# Patient Record
Sex: MALE | Race: WHITE | NOT HISPANIC OR LATINO | Employment: OTHER | URBAN - METROPOLITAN AREA
[De-identification: names, ages, dates, MRNs, and addresses within clinical notes are randomized per-mention and may not be internally consistent; named-entity substitution may affect disease eponyms.]

---

## 2017-01-17 ENCOUNTER — HOSPITAL ENCOUNTER (EMERGENCY)
Facility: HOSPITAL | Age: 82
Discharge: HOME | End: 2017-01-17
Attending: SURGERY
Payer: MEDICARE

## 2017-01-17 VITALS
HEART RATE: 63 BPM | WEIGHT: 145 LBS | OXYGEN SATURATION: 96 % | RESPIRATION RATE: 18 BRPM | DIASTOLIC BLOOD PRESSURE: 74 MMHG | TEMPERATURE: 98 F | SYSTOLIC BLOOD PRESSURE: 131 MMHG

## 2017-01-17 DIAGNOSIS — R51.9 NONINTRACTABLE HEADACHE, UNSPECIFIED CHRONICITY PATTERN, UNSPECIFIED HEADACHE TYPE: ICD-10-CM

## 2017-01-17 DIAGNOSIS — J01.00 ACUTE NON-RECURRENT MAXILLARY SINUSITIS: Primary | ICD-10-CM

## 2017-01-17 LAB
ALBUMIN SERPL BCP-MCNC: 3.4 G/DL
ALP SERPL-CCNC: 100 U/L
ALT SERPL W/O P-5'-P-CCNC: 26 U/L
ANION GAP SERPL CALC-SCNC: 9 MMOL/L
APTT BLDCRRT: 49.1 SEC
AST SERPL-CCNC: 28 U/L
BASOPHILS # BLD AUTO: 0.05 K/UL
BASOPHILS NFR BLD: 0.6 %
BILIRUB SERPL-MCNC: 0.7 MG/DL
BNP SERPL-MCNC: 135 PG/ML
BUN SERPL-MCNC: 21 MG/DL
CALCIUM SERPL-MCNC: 9.2 MG/DL
CHLORIDE SERPL-SCNC: 105 MMOL/L
CK MB SERPL-MCNC: 1.9 NG/ML
CK MB SERPL-RTO: 2.3 %
CK SERPL-CCNC: 81 U/L
CK SERPL-CCNC: 81 U/L
CO2 SERPL-SCNC: 27 MMOL/L
CREAT SERPL-MCNC: 0.9 MG/DL
DIFFERENTIAL METHOD: ABNORMAL
EOSINOPHIL # BLD AUTO: 0.6 K/UL
EOSINOPHIL NFR BLD: 7.5 %
ERYTHROCYTE [DISTWIDTH] IN BLOOD BY AUTOMATED COUNT: 13.4 %
EST. GFR  (AFRICAN AMERICAN): >60 ML/MIN/1.73 M^2
EST. GFR  (NON AFRICAN AMERICAN): >60 ML/MIN/1.73 M^2
GLUCOSE SERPL-MCNC: 101 MG/DL
HCT VFR BLD AUTO: 40.3 %
HGB BLD-MCNC: 14 G/DL
INR PPP: 5.1
LYMPHOCYTES # BLD AUTO: 2.8 K/UL
LYMPHOCYTES NFR BLD: 34.8 %
MAGNESIUM SERPL-MCNC: 2 MG/DL
MCH RBC QN AUTO: 32.7 PG
MCHC RBC AUTO-ENTMCNC: 34.7 %
MCV RBC AUTO: 94 FL
MONOCYTES # BLD AUTO: 0.8 K/UL
MONOCYTES NFR BLD: 10.2 %
NEUTROPHILS # BLD AUTO: 3.8 K/UL
NEUTROPHILS NFR BLD: 46.9 %
PHOSPHATE SERPL-MCNC: 3.4 MG/DL
PLATELET # BLD AUTO: 147 K/UL
PMV BLD AUTO: 10.4 FL
POTASSIUM SERPL-SCNC: 4 MMOL/L
PROT SERPL-MCNC: 6.9 G/DL
PROTHROMBIN TIME: 49.7 SEC
RBC # BLD AUTO: 4.28 M/UL
SODIUM SERPL-SCNC: 141 MMOL/L
T4 FREE SERPL-MCNC: 0.86 NG/DL
TROPONIN I SERPL DL<=0.01 NG/ML-MCNC: <0.006 NG/ML
TSH SERPL DL<=0.005 MIU/L-ACNC: 4.53 UIU/ML
WBC # BLD AUTO: 8.1 K/UL

## 2017-01-17 PROCEDURE — 83880 ASSAY OF NATRIURETIC PEPTIDE: CPT

## 2017-01-17 PROCEDURE — 96372 THER/PROPH/DIAG INJ SC/IM: CPT

## 2017-01-17 PROCEDURE — 82306 VITAMIN D 25 HYDROXY: CPT

## 2017-01-17 PROCEDURE — 85610 PROTHROMBIN TIME: CPT

## 2017-01-17 PROCEDURE — 99284 EMERGENCY DEPT VISIT MOD MDM: CPT | Mod: 25

## 2017-01-17 PROCEDURE — 83735 ASSAY OF MAGNESIUM: CPT

## 2017-01-17 PROCEDURE — 84484 ASSAY OF TROPONIN QUANT: CPT

## 2017-01-17 PROCEDURE — 63600175 PHARM REV CODE 636 W HCPCS: Performed by: SURGERY

## 2017-01-17 PROCEDURE — 36415 COLL VENOUS BLD VENIPUNCTURE: CPT

## 2017-01-17 PROCEDURE — 82553 CREATINE MB FRACTION: CPT

## 2017-01-17 PROCEDURE — 84443 ASSAY THYROID STIM HORMONE: CPT

## 2017-01-17 PROCEDURE — 80053 COMPREHEN METABOLIC PANEL: CPT

## 2017-01-17 PROCEDURE — 82607 VITAMIN B-12: CPT

## 2017-01-17 PROCEDURE — 93005 ELECTROCARDIOGRAM TRACING: CPT

## 2017-01-17 PROCEDURE — 85025 COMPLETE CBC W/AUTO DIFF WBC: CPT

## 2017-01-17 PROCEDURE — 84439 ASSAY OF FREE THYROXINE: CPT

## 2017-01-17 PROCEDURE — 99900031 HC PATIENT EDUCATION (STAT)

## 2017-01-17 PROCEDURE — 85730 THROMBOPLASTIN TIME PARTIAL: CPT

## 2017-01-17 PROCEDURE — 84100 ASSAY OF PHOSPHORUS: CPT

## 2017-01-17 PROCEDURE — 82746 ASSAY OF FOLIC ACID SERUM: CPT

## 2017-01-17 PROCEDURE — 93010 ELECTROCARDIOGRAM REPORT: CPT | Mod: ,,, | Performed by: INTERNAL MEDICINE

## 2017-01-17 RX ORDER — AZITHROMYCIN 250 MG/1
TABLET, FILM COATED ORAL
Qty: 6 TABLET | Refills: 0 | Status: SHIPPED | OUTPATIENT
Start: 2017-01-17 | End: 2017-01-23

## 2017-01-17 RX ORDER — HYDROCHLOROTHIAZIDE 25 MG/1
25 TABLET ORAL
COMMUNITY
End: 2017-02-24

## 2017-01-17 RX ORDER — ATORVASTATIN CALCIUM 40 MG/1
40 TABLET, FILM COATED ORAL DAILY
COMMUNITY

## 2017-01-17 RX ORDER — WARFARIN SODIUM 5 MG/1
5 TABLET ORAL DAILY
COMMUNITY
End: 2017-08-18

## 2017-01-17 RX ORDER — CEFTRIAXONE 1 G/1
1 INJECTION, POWDER, FOR SOLUTION INTRAMUSCULAR; INTRAVENOUS
Status: COMPLETED | OUTPATIENT
Start: 2017-01-17 | End: 2017-01-17

## 2017-01-17 RX ORDER — SOTALOL HYDROCHLORIDE 80 MG/1
40 TABLET ORAL 2 TIMES DAILY
COMMUNITY

## 2017-01-17 RX ADMIN — CEFTRIAXONE 1 G: 1 INJECTION, POWDER, FOR SOLUTION INTRAMUSCULAR; INTRAVENOUS at 07:01

## 2017-01-17 NOTE — ED AVS SNAPSHOT
[facility]  [address] [facility] [address]               Hernesto Sofia   2017  6:18 PM   ED    Description:  Male : 1927   Department:  [facility]           Your Care was Coordinated By:     Provider Role From To    Cheo Lu MD Attending Provider 17 [id number] --      Reason for Visit     Weakness     Headache           Diagnoses this Visit        Comments    Acute non-recurrent maxillary sinusitis    -  Primary     Nonintractable headache, unspecified chronicity pattern, unspecified headache type           ED Disposition     ED Disposition Condition Comment    Discharge             To Do List           Follow-up Information     Follow up with Matilde Hirsch MD. Schedule an appointment as soon as possible for a visit in 2 days.    Specialty:  Family Medicine    Contact information:    [address]  [facility] [address]  [phone]         These Medications        Disp Refills Start End    azithromycin (Z-RANJEET) 250 MG tablet 6 tablet 0 2017     Z-PACK AS DIRECTED      [facility]     [facility] Care [facility] -  Assistance  Registered nurses in the [facility] provide clinical advisement, health education, appointment booking, and other advisory services.  Call for this free service at [phone].             Medications           Message regarding Medications     Verify the changes and/or additions to your medication regime listed below are the same as discussed with your clinician today.  If any of these changes or additions are incorrect, please notify your healthcare provider.        START taking these NEW medications        Refills    azithromycin (Z-RANJEET) 250 MG tablet 0    Sig: Z-PACK AS DIRECTED    Class: Print      These medications were administered today        Dose Freq    cefTRIAXone injection 1 g 1 g ED 1 Time    Sig: Inject 1 g into the muscle ED 1 Time.    Class: Normal     Route: Intramuscular           Verify that the below list of medications is an accurate representation of the medications you are currently taking.  If none reported, the list may be blank. If incorrect, please contact your healthcare provider. Carry this list with you in case of emergency.           Current Medications     atorvastatin (LIPITOR) 40 MG tablet Take 40 mg by mouth once daily.    hydrochlorothiazide (HYDRODIURIL) 25 MG tablet Take 25 mg by mouth every other day.    sotalol (BETAPACE) 80 MG tablet Take 40 mg by mouth 2 (two) times daily.    warfarin (COUMADIN) 5 MG tablet Take 5 mg by mouth Daily.    azithromycin (Z-RANJEET) 250 MG tablet Z-PACK AS DIRECTED    cefTRIAXone injection 1 g Inject 1 g into the muscle ED 1 Time.           Clinical Reference Information           Your Vitals Were     BP Pulse Temp Resp Weight SpO2    131/74 (BP Location: Left arm, Patient Position: Lying, BP Method: Automatic) 63 98.2 °F (36.8 °C) (Oral) 18 65.8 kg (145 lb) 96%      Allergies as of 1/17/2017     No Known Allergies      Immunizations Administered on Date of Encounter - 1/17/2017     None      ED Micro, Lab, POCT     Start Ordered       Status Ordering Provider    01/17/17 1821 01/17/17 1821  Urinalysis  STAT      Acknowledged     01/17/17 1821 01/17/17 1821  Magnesium  STAT      Final result     01/17/17 1821 01/17/17 1821  Phosphorus  STAT      Final result     01/17/17 1821 01/17/17 1821  TSH  STAT      Final result     01/17/17 1821 01/17/17 1821  Vitamin D  STAT      In process     01/17/17 1821 01/17/17 1821  Folate  STAT      In process     01/17/17 1821 01/17/17 1821  Vitamin B12  STAT      In process     01/17/17 1821 01/17/17 1821  Comprehensive metabolic panel  STAT      Final result     01/17/17 1821 01/17/17 1821  CBC auto differential  STAT      Final result     01/17/17 1821 01/17/17 1821  Troponin I  Now then every 6 hours     Start Status   01/17/17 1821 Final result   01/18/17 0021 Scheduled    01/18/17 0621 Scheduled   01/18/17 1221 Scheduled   01/18/17 1821 Scheduled   01/19/17 0021 Scheduled   01/19/17 0621 Scheduled   01/19/17 1221 Scheduled   01/19/17 1821 Scheduled   01/20/17 0021 Scheduled   01/20/17 0621 Scheduled   01/20/17 1221 Scheduled   01/20/17 1821 Scheduled       Acknowledged     01/17/17 1821 01/17/17 1821  CK  STAT      Final result     01/17/17 1821 01/17/17 1821  CK-MB  STAT      Final result     01/17/17 1821 01/17/17 1821  Brain natriuretic peptide  STAT      Final result     01/17/17 1821 01/17/17 1821  Protime-INR  STAT      In process     01/17/17 1821 01/17/17 1821  APTT  STAT      In process     01/17/17 1821 01/17/17 1821  T4, free  Once      In process       ED Imaging Orders     Start Ordered       Status Ordering Provider    01/17/17 1821 01/17/17 1821  CT Head Without Contrast  1 time imaging      Final result     01/17/17 1821 01/17/17 1821  X-Ray Chest 1 View  1 time imaging      Final result         Discharge Instructions         Sinus Headaches     When using nasal spray, keep your chin down and angle the spray away from center.     Sinus headaches can cause a gnawing pain behind the nose and eyes. The pain most often gets worse in the afternoon and evening. You may also run a fever. Sinus headaches are caused by colds or allergies that make the nasal passages inflamed or infected.  To help prevent sinus headaches:  · Treat colds promptly to keep mucus from backing up.  · Avoid things that trigger sinus problems, such as pollens, dust, smoke, fumes, and strong odors.  · Take allergy medications as directed by your doctor.  To relieve the pain:  · Keep your sinuses open and free of mucus. Try over-the-counter sinus rinse products.  · Use a nasal decongestant as directed to reduce the inflammation.  · Drink fluids to keep the mucus thinner. This helps it drain more easily. You can also use a humidifier.  · Apply hot packs to the area around your sinuses. Use a hot water  bottle.  · See your doctor if your sinus headache lasts more than two weeks. You may need medication for a sinus infection or an exam to check for other headache conditions, like migraines.  © 6493-7278 [employer], "[employer]. [address] [place] [address], [place] [address]. All rights reserved. This information is not intended as a substitute for professional medical care. Always follow your healthcare professional's instructions.          [facility] Sign-Up     Activating your [facility] account is as easy as 1-2-3!     1) Visit [email or web address], select Sign Up Now, enter this activation code and your date of birth, then select Next.  [email or web address]  Expires: 3/3/2017  7:45 PM      2) Create a username and password to use when you visit [facility] in the future and select a security question in case you lose your password and select Next.    3) Enter your e-mail address and click Sign Up!    Additional Information  If you have questions, please e-mail [email or web address] or call [phone] to talk to our [facility] staff. Remember, [facility] is NOT to be used for urgent needs. For medical emergencies, dial 911.          [facility] complies with applicable Federal civil rights laws and does not discriminate on the basis of race, color, national origin, age, disability, or sex.        Language Assistance Services     ATTENTION: Language assistance services are available, free of charge. Please call [phone].      ATENCIÓN: Si habla coleañol, tiene a zuluaga disposición servicios gratuitos de asistencia lingüística. Llame al [phone].     CHÚ Ý: N?u b?n nói Ti?ng Vi?t, có các d?ch v? h? tr? ngôn ng? mi?n phí dành cho b?n. G?i s? [phone].

## 2017-01-18 LAB
25(OH)D3+25(OH)D2 SERPL-MCNC: 45 NG/ML
FOLATE SERPL-MCNC: 17 NG/ML
VIT B12 SERPL-MCNC: 784 PG/ML

## 2017-01-18 NOTE — DISCHARGE INSTRUCTIONS
Sinus Headaches     When using nasal spray, keep your chin down and angle the spray away from center.     Sinus headaches can cause a gnawing pain behind the nose and eyes. The pain most often gets worse in the afternoon and evening. You may also run a fever. Sinus headaches are caused by colds or allergies that make the nasal passages inflamed or infected.  To help prevent sinus headaches:  · Treat colds promptly to keep mucus from backing up.  · Avoid things that trigger sinus problems, such as pollens, dust, smoke, fumes, and strong odors.  · Take allergy medications as directed by your doctor.  To relieve the pain:  · Keep your sinuses open and free of mucus. Try over-the-counter sinus rinse products.  · Use a nasal decongestant as directed to reduce the inflammation.  · Drink fluids to keep the mucus thinner. This helps it drain more easily. You can also use a humidifier.  · Apply hot packs to the area around your sinuses. Use a hot water bottle.  · See your doctor if your sinus headache lasts more than two weeks. You may need medication for a sinus infection or an exam to check for other headache conditions, like migraines.  © 1561-7285 [employer]. [address] [place] [address], [place] [address]. All rights reserved. This information is not intended as a substitute for professional medical care. Always follow your healthcare professional's instructions.

## 2017-01-18 NOTE — ED NOTES
"Pt presents to the ER with c/o headache. Pt states "it's not really a headache, but it hurts when I touch my head." Family says that patient "jerked" when they touched the back of his head. Pt states that his head feels hot. Pt denies any injury or fall. Family states that the patient has been weak, and not wanting to eat lately. Patient says "I just don't feel good."   "

## 2017-01-18 NOTE — ED PROVIDER NOTES
[facility]                                        January 17, 2017                     Chief Complaint  89 y.o. male with Weakness and Headache    History of Present Illness  Hernesto Sofia presents to the emergency room with right-sided headache ×2 days  Patient has felt a shocking pain in his right scalp area since yesterday per his daughters  The patient is alert and oriented ×3 with a GCS 15 and normal motor neuro exam here  Patient states he currently has no pain or headache, answers all questions appropriately  Patient had left sided shingles last year, daughters are concerned this is a recurrence  Patient has no shingles type rash, no obvious shingles type pain, no shingles noted now  Patient denies any chest pain or shortness of breath, last headache was this morning    The history is provided by the patient    Past Medical History   -- A-fib    -- Anticoagulant long-term use    -- Hyperlipidemia    -- Hypertension    -- Pacemaker      History reviewed. No pertinent past surgical history.  No Known Allergies     Review of Systems and Physical Exam     Review of Systems  -- Constitution - no fever, denies fatigue, no weakness, no chills  -- Eyes - no tearing or redness, no visual disturbance  -- Ear, Nose - no tinnitus or earache, no nasal congestion or discharge  -- Mouth,Throat - no sore throat, no toothache, normal voice, normal swallowing  -- Respiratory - denies cough and congestion, no shortness of breath, no DONALDSON  -- Cardiovascular - denies chest pain, no palpitations, denies claudication  -- Gastrointestinal - denies abdominal pain, nausea, vomiting, or diarrhea  -- Musculoskeletal - denies back pain, negative for myalgias and arthralgias   -- Neurological - headache, denies weakness or seizure; no LOC  -- Skin - denies pallor, rash, or changes in skin. no hives or welts noted    Vital Signs  -- BP is 131/74 and his pulse is 63. His respiration is 18 and oxygen saturation is 96%.       Physical Exam  -- Nursing note and vitals reviewed  -- Constitutional: Appears well-developed and well-nourished  -- Head: Atraumatic. Normocephalic. No obvious abnormality  -- Eyes: Pupils are equal and reactive to light. Normal conjunctiva and lids  -- Nose: Nose normal in appearance, nares grossly normal. No discharge  -- Throat: Mucous membranes moist, pharynx normal, normal tonsils. No lesions   -- Ears: External ears and TM normal bilaterally. Normal hearing and no drainage  -- Neck: Normal range of motion. Neck supple. No masses, trachea midline  -- Cardiac: Normal rate, regular rhythm and normal heart sounds  -- Pulmonary: Normal respiratory effort, breath sounds clear to auscultation  -- Abdominal: Soft, no tenderness. Normal bowel sounds. Normal liver edge  -- Musculoskeletal: Normal range of motion, no effusions. Joints stable   -- Neurological: No focal deficits. Showed good interaction with staff    Emergency Room Course     Treatment and Evaluation  -- The CT of the head performed in the ER today was negative for acute pathology   -- The EKG findings today were without concerning findings from baseline   -- Chest x-ray showed no infiltrate and showed no acute pathology   -- The electrolytes drawn in the ER today were within normal limits   -- The CBC drawn in the ER today was within normal limits   -- The cardiac enzymes were within normal limits   -- BNP was 135  -- The magnesium and phosphorus were within normal limits   -- The PT, PTT, and INR were within normal limits.    -- Vitamin D, folate, B12, T4 pending  -- TSH is 4.5  -- IM Rocephin given today in the ER    Diagnosis  -- Acute non-recurrent maxillary sinusitis  -- Nonintractable headache    Disposition and Plan  -- Disposition: home  -- Condition: stable  -- Follow-up: Patient to follow up with a MD in 1-2 days.  -- I advised the patient that we have found no life threatening condition today  -- At this time, I believe the patient is  clinically stable for discharge.   -- The patient acknowledges that close follow up with a MD is required   -- Patient agrees to comply with all instruction and direction given in the ER    This note is dictated on Dragon Natural Speaking word recognition program.  There are word recognition mistakes that are occasionally missed on review.           Cheo Lu MD  01/17/17 1943

## 2017-01-20 ENCOUNTER — LAB VISIT (OUTPATIENT)
Dept: LAB | Facility: HOSPITAL | Age: 82
End: 2017-01-20
Attending: INTERNAL MEDICINE
Payer: MEDICARE

## 2017-01-20 DIAGNOSIS — I48.91 ATRIAL FIBRILLATION: Primary | ICD-10-CM

## 2017-01-20 LAB
INR PPP: 1.5
PROTHROMBIN TIME: 14.7 SEC

## 2017-01-20 PROCEDURE — 36415 COLL VENOUS BLD VENIPUNCTURE: CPT

## 2017-01-20 PROCEDURE — 85610 PROTHROMBIN TIME: CPT

## 2017-01-23 ENCOUNTER — HOSPITAL ENCOUNTER (EMERGENCY)
Facility: HOSPITAL | Age: 82
Discharge: HOME | End: 2017-01-24
Attending: EMERGENCY MEDICINE
Payer: MEDICARE

## 2017-01-23 DIAGNOSIS — F41.9 ANXIETY: Primary | ICD-10-CM

## 2017-01-23 PROCEDURE — 93005 ELECTROCARDIOGRAM TRACING: CPT

## 2017-01-23 PROCEDURE — 99900031 HC PATIENT EDUCATION (STAT)

## 2017-01-23 PROCEDURE — 99284 EMERGENCY DEPT VISIT MOD MDM: CPT

## 2017-01-23 PROCEDURE — 87400 INFLUENZA A/B EACH AG IA: CPT | Mod: 59

## 2017-01-23 PROCEDURE — 36415 COLL VENOUS BLD VENIPUNCTURE: CPT

## 2017-01-23 PROCEDURE — 80053 COMPREHEN METABOLIC PANEL: CPT

## 2017-01-23 PROCEDURE — 85025 COMPLETE CBC W/AUTO DIFF WBC: CPT

## 2017-01-23 PROCEDURE — 93010 ELECTROCARDIOGRAM REPORT: CPT | Mod: ,,, | Performed by: INTERNAL MEDICINE

## 2017-01-23 NOTE — ED AVS SNAPSHOT
[facility]  [address] [facility] [address]               Hernesto Sofia   2017 11:20 PM   ED    Description:  Male : 1927   Department:  [facility]           Your Care was Coordinated By:     Provider Role From To    Vik Lam MD Attending Provider 17 [phone] --      Reason for Visit     Spasms           Diagnoses this Visit        Comments    Anxiety    -  Primary       ED Disposition     ED Disposition Condition Comment    Discharge             To Do List           Follow-up Information     Schedule an appointment as soon as possible for a visit with Hasmukh Pederson MD.    Specialty:  Family Medicine    Contact information:    [address] [place] [address]  [phone]         These Medications        Disp Refills Start End    lorazepam (ATIVAN) 1 MG tablet 10 tablet 0 2017    Take 0.5 tablets (0.5 mg total) by mouth every 8 (eight) hours as needed for Anxiety. - Oral      [facility]     [facility] Care [facility] -  Assistance  Registered nurses in the [facility] provide clinical advisement, health education, appointment booking, and other advisory services.  Call for this free service at [phone].             Medications           Message regarding Medications     Verify the changes and/or additions to your medication regime listed below are the same as discussed with your clinician today.  If any of these changes or additions are incorrect, please notify your healthcare provider.        START taking these NEW medications        Refills    lorazepam (ATIVAN) 1 MG tablet 0    Sig: Take 0.5 tablets (0.5 mg total) by mouth every 8 (eight) hours as needed for Anxiety.    Class: Print    Route: Oral      These medications were administered today        Dose Freq    lorazepam tablet 0.5 mg 0.5 mg ED 1 Time    Sig: Take 1 tablet (0.5 mg total) by mouth ED 1 Time.    Class:  Normal    Route: Oral      STOP taking these medications     azithromycin (Z-RANJEET) 250 MG tablet Z-PACK AS DIRECTED           Verify that the below list of medications is an accurate representation of the medications you are currently taking.  If none reported, the list may be blank. If incorrect, please contact your healthcare provider. Carry this list with you in case of emergency.           Current Medications     atorvastatin (LIPITOR) 40 MG tablet Take 40 mg by mouth once daily.    hydrochlorothiazide (HYDRODIURIL) 25 MG tablet Take 25 mg by mouth every other day.    sotalol (BETAPACE) 80 MG tablet Take 40 mg by mouth 2 (two) times daily.    warfarin (COUMADIN) 5 MG tablet Take 5 mg by mouth Daily.    lorazepam (ATIVAN) 1 MG tablet Take 0.5 tablets (0.5 mg total) by mouth every 8 (eight) hours as needed for Anxiety.    lorazepam tablet 0.5 mg Take 1 tablet (0.5 mg total) by mouth ED 1 Time.           Clinical Reference Information           Your Vitals Were     BP Pulse Temp Resp SpO2       145/74 70 98.5 °F (36.9 °C) (Oral) 18 98%       Allergies as of 1/24/2017     No Known Allergies      Immunizations Administered on Date of Encounter - 1/24/2017     None      ED Micro, Lab, POCT     Start Ordered       Status Ordering Provider    01/23/17 2330 01/23/17 2333  Influenza antigen Nasopharyngeal Swab  Once      Final result     01/23/17 2329 01/23/17 2328  Comprehensive metabolic panel  STAT      Final result     01/23/17 2329 01/23/17 2328  CBC auto differential  STAT      Final result       ED Imaging Orders     Start Ordered       Status Ordering Provider    01/23/17 2329 01/23/17 2328  CT Head Without Contrast  1 time imaging      In process         Discharge Instructions         Understanding Anxiety Disorders  Almost everyone gets nervous now and then. Its normal to have knots in your stomach before a test, or for your heart to race on a first date. But an anxiety disorder is much more than a case of  nerves. In fact, its symptoms may be overwhelming. But treatment can relieve many of these symptoms. Talking to your doctor is the first step.    What are anxiety disorders?  An anxiety disorder causes intense feelings of panic and fear. These feelings may arise for no apparent reason. And they tend to recur again and again. They may prevent you from coping with life and cause you great distress. As a result, you may avoid anything that triggers your fear. In extreme cases, you may never leave the house. Anxiety disorders may cause other symptoms, such as:  · Obsessive thoughts you cant control  · Constant nightmares or painful thoughts of the past  · Nausea, sweating, and muscle tension  · Difficulty sleeping or concentrating  What causes anxiety disorders?  Anxiety disorders tend to run in families. For some people, childhood abuse or neglect may play a role. For others, stressful life events or trauma may trigger anxiety disorders. Anxiety can trigger low self-esteem and poor coping skills.  Common anxiety disorders  · Panic disorder: This causes an intense fear of being in danger.  · Phobias: These are extreme fears of certain objects, places, or events.  · Obsessive-compulsive disorder: This causes you to have unwanted thoughts. You also may perform certain actions over and over.  · Posttraumatic stress disorder: This occurs in people who have survived a terrible ordeal. It can cause nightmares and flashbacks about the event.  · Generalized anxiety disorder: This causes constant worry that can greatly disrupt your life.   Getting better  You may believe that nothing can help you. Or, you might fear what others may think. But most anxiety symptoms can be eased. Having an anxiety disorder is nothing to be ashamed of. Most people do best with treatment that combines medication and therapy. Although these arent cures, they can help you live a healthier life.  © 7172-7619 [employer]. [address] [place] [address]  [place] [address]. All rights reserved. This information is not intended as a substitute for professional medical care. Always follow your healthcare professional's instructions.          [facility] Sign-Up     Activating your [facility] account is as easy as 1-2-3!     1) Visit [email or web address], select Sign Up Now, enter this activation code and your date of birth, then select Next.  [email or web address]  Expires: 3/3/2017  7:45 PM      2) Create a username and password to use when you visit [facility] in the future and select a security question in case you lose your password and select Next.    3) Enter your e-mail address and click Sign Up!    Additional Information  If you have questions, please e-mail [email or web address] or call [phone] to talk to our [facility] staff. Remember, [facility] is NOT to be used for urgent needs. For medical emergencies, dial 911.          [facility] complies with applicable Federal civil rights laws and does not discriminate on the basis of race, color, national origin, age, disability, or sex.        Language Assistance Services     ATTENTION: Language assistance services are available, free of charge. Please call [phone].      ATENCIÓN: Si habla español, tiene a zuluaga disposición servicios gratuitos de asistencia lingüística. Llame al [phone].     CHÚ Ý: N?u b?n nói Ti?ng Vi?t, có các d?ch v? h? tr? ngôn ng? mi?n phí dành cho b?n. G?i s? [phone].

## 2017-01-24 VITALS
DIASTOLIC BLOOD PRESSURE: 76 MMHG | SYSTOLIC BLOOD PRESSURE: 137 MMHG | OXYGEN SATURATION: 97 % | HEART RATE: 71 BPM | TEMPERATURE: 99 F | RESPIRATION RATE: 18 BRPM

## 2017-01-24 LAB
ALBUMIN SERPL BCP-MCNC: 3.2 G/DL
ALP SERPL-CCNC: 92 U/L
ALT SERPL W/O P-5'-P-CCNC: 28 U/L
ANION GAP SERPL CALC-SCNC: 9 MMOL/L
AST SERPL-CCNC: 34 U/L
BASOPHILS # BLD AUTO: 0.08 K/UL
BASOPHILS NFR BLD: 1 %
BILIRUB SERPL-MCNC: 0.5 MG/DL
BUN SERPL-MCNC: 22 MG/DL
CALCIUM SERPL-MCNC: 9.2 MG/DL
CHLORIDE SERPL-SCNC: 105 MMOL/L
CO2 SERPL-SCNC: 28 MMOL/L
CREAT SERPL-MCNC: 0.8 MG/DL
DIFFERENTIAL METHOD: ABNORMAL
EOSINOPHIL # BLD AUTO: 0.5 K/UL
EOSINOPHIL NFR BLD: 6.8 %
ERYTHROCYTE [DISTWIDTH] IN BLOOD BY AUTOMATED COUNT: 13 %
EST. GFR  (AFRICAN AMERICAN): >60 ML/MIN/1.73 M^2
EST. GFR  (NON AFRICAN AMERICAN): >60 ML/MIN/1.73 M^2
FLUAV AG SPEC QL IA: NEGATIVE
FLUBV AG SPEC QL IA: NEGATIVE
GLUCOSE SERPL-MCNC: 96 MG/DL
HCT VFR BLD AUTO: 39.1 %
HGB BLD-MCNC: 13.6 G/DL
LYMPHOCYTES # BLD AUTO: 2.1 K/UL
LYMPHOCYTES NFR BLD: 28 %
MCH RBC QN AUTO: 32.9 PG
MCHC RBC AUTO-ENTMCNC: 34.8 %
MCV RBC AUTO: 94 FL
MONOCYTES # BLD AUTO: 0.8 K/UL
MONOCYTES NFR BLD: 10 %
NEUTROPHILS # BLD AUTO: 4.1 K/UL
NEUTROPHILS NFR BLD: 54.2 %
PLATELET # BLD AUTO: 169 K/UL
PMV BLD AUTO: 10.7 FL
POTASSIUM SERPL-SCNC: 4.1 MMOL/L
PROT SERPL-MCNC: 6.8 G/DL
RBC # BLD AUTO: 4.14 M/UL
SODIUM SERPL-SCNC: 142 MMOL/L
SPECIMEN SOURCE: NORMAL
WBC # BLD AUTO: 7.63 K/UL

## 2017-01-24 PROCEDURE — 25000003 PHARM REV CODE 250: Performed by: EMERGENCY MEDICINE

## 2017-01-24 RX ORDER — LORAZEPAM 0.5 MG/1
0.5 TABLET ORAL
Status: COMPLETED | OUTPATIENT
Start: 2017-01-24 | End: 2017-01-24

## 2017-01-24 RX ORDER — LORAZEPAM 1 MG/1
0.5 TABLET ORAL EVERY 8 HOURS PRN
Qty: 10 TABLET | Refills: 0 | Status: SHIPPED | OUTPATIENT
Start: 2017-01-24 | End: 2017-02-17 | Stop reason: ALTCHOICE

## 2017-01-24 RX ADMIN — LORAZEPAM 0.5 MG: 0.5 TABLET ORAL at 12:01

## 2017-01-24 NOTE — DISCHARGE INSTRUCTIONS
Understanding Anxiety Disorders  Almost everyone gets nervous now and then. Its normal to have knots in your stomach before a test, or for your heart to race on a first date. But an anxiety disorder is much more than a case of nerves. In fact, its symptoms may be overwhelming. But treatment can relieve many of these symptoms. Talking to your doctor is the first step.    What are anxiety disorders?  An anxiety disorder causes intense feelings of panic and fear. These feelings may arise for no apparent reason. And they tend to recur again and again. They may prevent you from coping with life and cause you great distress. As a result, you may avoid anything that triggers your fear. In extreme cases, you may never leave the house. Anxiety disorders may cause other symptoms, such as:  · Obsessive thoughts you cant control  · Constant nightmares or painful thoughts of the past  · Nausea, sweating, and muscle tension  · Difficulty sleeping or concentrating  What causes anxiety disorders?  Anxiety disorders tend to run in families. For some people, childhood abuse or neglect may play a role. For others, stressful life events or trauma may trigger anxiety disorders. Anxiety can trigger low self-esteem and poor coping skills.  Common anxiety disorders  · Panic disorder: This causes an intense fear of being in danger.  · Phobias: These are extreme fears of certain objects, places, or events.  · Obsessive-compulsive disorder: This causes you to have unwanted thoughts. You also may perform certain actions over and over.  · Posttraumatic stress disorder: This occurs in people who have survived a terrible ordeal. It can cause nightmares and flashbacks about the event.  · Generalized anxiety disorder: This causes constant worry that can greatly disrupt your life.   Getting better  You may believe that nothing can help you. Or, you might fear what others may think. But most anxiety symptoms can be eased. Having an anxiety  disorder is nothing to be ashamed of. Most people do best with treatment that combines medication and therapy. Although these arent cures, they can help you live a healthier life.  © 6422-5825 [employer]. [address] [place] [address], [place] [address]. All rights reserved. This information is not intended as a substitute for professional medical care. Always follow your healthcare professional's instructions.

## 2017-01-24 NOTE — ED TRIAGE NOTES
Pt reports spasms to his left leg and right side of his face at times. He reports he came in last week for the same issue and was diagnosed at sinus infection.

## 2017-01-24 NOTE — ED PROVIDER NOTES
Encounter Date: 1/23/2017       History     Chief Complaint   Patient presents with    Spasms     Review of patient's allergies indicates:  No Known Allergies  Patient is a 89 y.o. male presenting with the following complaint: general illness. The history is provided by the patient and a relative.   General Illness    The current episode started today. The problem occurs frequently. The problem has been resolved. The pain is at a severity of 2/10. Nothing relieves the symptoms. The symptoms are aggravated by activity. Associated symptoms include muscle aches. Pertinent negatives include no fever, no decreased vision, no double vision, no eye itching, no photophobia, no abdominal pain, no constipation, no diarrhea, no nausea, no vomiting, no congestion, no ear discharge, no ear pain, no headaches, no hearing loss, no mouth sores, no rhinorrhea, no sore throat, no stridor, no swollen glands, no neck pain, no neck stiffness, no cough, no shortness of breath, no URI, no wheezing, no rash, no discharge, no pain and no eye redness. Services received include tests performed. Recently, medical care has been given at this facility.     Past Medical History   Diagnosis Date    A-fib     Anticoagulant long-term use     Hyperlipidemia     Hypertension     Pacemaker      No past medical history pertinent negatives.  No past surgical history on file.  History reviewed. No pertinent family history.  Social History   Substance Use Topics    Smoking status: Never Smoker    Smokeless tobacco: None    Alcohol use No     Review of Systems   Constitutional: Negative for fever.   HENT: Negative for congestion, ear discharge, ear pain, hearing loss, mouth sores, rhinorrhea and sore throat.    Eyes: Negative for double vision, photophobia, pain, discharge, redness and itching.   Respiratory: Negative for cough, shortness of breath, wheezing and stridor.    Gastrointestinal: Negative for abdominal pain, constipation, diarrhea,  nausea and vomiting.   Genitourinary: Negative for flank pain and frequency.   Musculoskeletal: Negative for back pain, neck pain and neck stiffness.   Skin: Negative for color change, pallor, rash and wound.   Neurological: Negative for tremors, syncope, speech difficulty, weakness and headaches.       Physical Exam   Initial Vitals   BP Pulse Resp Temp SpO2   01/23/17 2319 01/23/17 2319 01/23/17 2319 01/23/17 2319 01/23/17 2319   145/74 70 18 98.5 °F (36.9 °C) 98 %     Physical Exam    Nursing note and vitals reviewed.  Constitutional: He appears well-developed and well-nourished. He is not diaphoretic. No distress.   HENT:   Head: Normocephalic and atraumatic.   Mouth/Throat: No oropharyngeal exudate.   Eyes: Conjunctivae and EOM are normal. Pupils are equal, round, and reactive to light. Right eye exhibits no discharge. Left eye exhibits no discharge. No scleral icterus.   Neck: Normal range of motion. Neck supple. No JVD present.   Cardiovascular: Normal rate, regular rhythm and normal heart sounds. Exam reveals no friction rub.    Pulmonary/Chest: Breath sounds normal. No stridor. No respiratory distress. He has no wheezes. He has no rhonchi. He has no rales.   Abdominal: Soft. Bowel sounds are normal. He exhibits no distension. There is no tenderness. There is no rebound and no guarding.   Musculoskeletal: Normal range of motion. He exhibits no edema or tenderness.   Neurological: He is alert and oriented to person, place, and time. He has normal strength and normal reflexes. No sensory deficit.   Skin: No rash noted.         ED Course   Procedures  Labs Reviewed   COMPREHENSIVE METABOLIC PANEL   CBC W/ AUTO DIFFERENTIAL                               ED Course     Clinical Impression:   The encounter diagnosis was Anxiety.    Disposition:   Disposition: Discharged  Condition: Stable       Vik Lam MD  01/24/17 0019

## 2017-01-30 ENCOUNTER — HOSPITAL ENCOUNTER (INPATIENT)
Facility: HOSPITAL | Age: 82
LOS: 5 days | Discharge: HOME HEALTH | DRG: 308 | End: 2017-02-05
Attending: SURGERY | Admitting: INTERNAL MEDICINE
Payer: MEDICARE

## 2017-01-30 DIAGNOSIS — J40 BRONCHITIS: ICD-10-CM

## 2017-01-30 DIAGNOSIS — F05 ACUTE CONFUSIONAL STATE: ICD-10-CM

## 2017-01-30 DIAGNOSIS — I48.91 ATRIAL FIBRILLATION WITH RVR: Primary | ICD-10-CM

## 2017-01-30 DIAGNOSIS — D72.829 LEUKOCYTOSIS, UNSPECIFIED TYPE: ICD-10-CM

## 2017-01-30 DIAGNOSIS — J18.9 PNEUMONIA OF LEFT UPPER LOBE DUE TO INFECTIOUS ORGANISM: ICD-10-CM

## 2017-01-30 DIAGNOSIS — M54.12 CERVICAL RADICULAR PAIN: ICD-10-CM

## 2017-01-30 PROBLEM — R51.9 CHRONIC RIGHT-SIDED HEADACHES: Status: ACTIVE | Noted: 2017-01-30

## 2017-01-30 PROBLEM — I10 HTN (HYPERTENSION): Status: ACTIVE | Noted: 2017-01-30

## 2017-01-30 PROBLEM — E78.5 HLD (HYPERLIPIDEMIA): Status: ACTIVE | Noted: 2017-01-30

## 2017-01-30 PROBLEM — G89.29 CHRONIC RIGHT-SIDED HEADACHES: Status: ACTIVE | Noted: 2017-01-30

## 2017-01-30 LAB
ALBUMIN SERPL BCP-MCNC: 3.4 G/DL
ALP SERPL-CCNC: 90 U/L
ALT SERPL W/O P-5'-P-CCNC: 22 U/L
ANION GAP SERPL CALC-SCNC: 9 MMOL/L
APTT BLDCRRT: 42.6 SEC
AST SERPL-CCNC: 26 U/L
BACTERIA #/AREA URNS HPF: ABNORMAL /HPF
BASOPHILS # BLD AUTO: 0.05 K/UL
BASOPHILS NFR BLD: 0.2 %
BILIRUB SERPL-MCNC: 1.2 MG/DL
BILIRUB UR QL STRIP: ABNORMAL
BNP SERPL-MCNC: 390 PG/ML
BUN SERPL-MCNC: 28 MG/DL
CALCIUM SERPL-MCNC: 9.9 MG/DL
CHLORIDE SERPL-SCNC: 106 MMOL/L
CK MB SERPL-MCNC: 0.8 NG/ML
CK MB SERPL-RTO: 1.3 %
CK SERPL-CCNC: 64 U/L
CK SERPL-CCNC: 64 U/L
CLARITY UR: CLEAR
CO2 SERPL-SCNC: 26 MMOL/L
COLOR UR: YELLOW
CREAT SERPL-MCNC: 1.1 MG/DL
DIFFERENTIAL METHOD: ABNORMAL
EOSINOPHIL # BLD AUTO: 0 K/UL
EOSINOPHIL NFR BLD: 0 %
ERYTHROCYTE [DISTWIDTH] IN BLOOD BY AUTOMATED COUNT: 13.3 %
ERYTHROCYTE [SEDIMENTATION RATE] IN BLOOD BY WESTERGREN METHOD: 33 MM/HR
EST. GFR  (AFRICAN AMERICAN): >60 ML/MIN/1.73 M^2
EST. GFR  (NON AFRICAN AMERICAN): 59 ML/MIN/1.73 M^2
GLUCOSE SERPL-MCNC: 155 MG/DL
GLUCOSE UR QL STRIP: NEGATIVE
HCT VFR BLD AUTO: 44.2 %
HGB BLD-MCNC: 15.2 G/DL
HGB UR QL STRIP: NEGATIVE
HYALINE CASTS #/AREA URNS LPF: 0 /LPF
INR PPP: 3.1
KETONES UR QL STRIP: ABNORMAL
LACTATE SERPL-SCNC: 2 MMOL/L
LEUKOCYTE ESTERASE UR QL STRIP: NEGATIVE
LYMPHOCYTES # BLD AUTO: 1.8 K/UL
LYMPHOCYTES NFR BLD: 8.3 %
MAGNESIUM SERPL-MCNC: 2.2 MG/DL
MCH RBC QN AUTO: 32.5 PG
MCHC RBC AUTO-ENTMCNC: 34.4 %
MCV RBC AUTO: 95 FL
MICROSCOPIC COMMENT: ABNORMAL
MONOCYTES # BLD AUTO: 1.9 K/UL
MONOCYTES NFR BLD: 8.8 %
NEUTROPHILS # BLD AUTO: 17.9 K/UL
NEUTROPHILS NFR BLD: 83 %
NITRITE UR QL STRIP: NEGATIVE
PH UR STRIP: 5 [PH] (ref 5–8)
PHOSPHATE SERPL-MCNC: 2.3 MG/DL
PLATELET # BLD AUTO: 211 K/UL
PMV BLD AUTO: 10.7 FL
POTASSIUM SERPL-SCNC: 4.5 MMOL/L
PROCALCITONIN SERPL IA-MCNC: 0.28 NG/ML
PROT SERPL-MCNC: 7.5 G/DL
PROT UR QL STRIP: ABNORMAL
PROTHROMBIN TIME: 30.8 SEC
RBC # BLD AUTO: 4.67 M/UL
RBC #/AREA URNS HPF: 0 /HPF (ref 0–4)
SODIUM SERPL-SCNC: 141 MMOL/L
SP GR UR STRIP: 1.02 (ref 1–1.03)
TROPONIN I SERPL DL<=0.01 NG/ML-MCNC: 0.01 NG/ML
TROPONIN I SERPL DL<=0.01 NG/ML-MCNC: 0.01 NG/ML
TROPONIN I SERPL DL<=0.01 NG/ML-MCNC: <0.006 NG/ML
TSH SERPL DL<=0.005 MIU/L-ACNC: 1.19 UIU/ML
URN SPEC COLLECT METH UR: ABNORMAL
UROBILINOGEN UR STRIP-ACNC: 1 EU/DL
WBC # BLD AUTO: 21.7 K/UL
WBC #/AREA URNS HPF: 10 /HPF (ref 0–5)

## 2017-01-30 PROCEDURE — 84443 ASSAY THYROID STIM HORMONE: CPT

## 2017-01-30 PROCEDURE — 85025 COMPLETE CBC W/AUTO DIFF WBC: CPT

## 2017-01-30 PROCEDURE — 83605 ASSAY OF LACTIC ACID: CPT

## 2017-01-30 PROCEDURE — G0378 HOSPITAL OBSERVATION PER HR: HCPCS

## 2017-01-30 PROCEDURE — 82553 CREATINE MB FRACTION: CPT

## 2017-01-30 PROCEDURE — 25000242 PHARM REV CODE 250 ALT 637 W/ HCPCS: Performed by: INTERNAL MEDICINE

## 2017-01-30 PROCEDURE — 84484 ASSAY OF TROPONIN QUANT: CPT | Mod: 91

## 2017-01-30 PROCEDURE — 85651 RBC SED RATE NONAUTOMATED: CPT

## 2017-01-30 PROCEDURE — 63600175 PHARM REV CODE 636 W HCPCS: Performed by: SURGERY

## 2017-01-30 PROCEDURE — 99220 PR INITIAL OBSERVATION CARE,LEVL III: CPT | Mod: ,,, | Performed by: INTERNAL MEDICINE

## 2017-01-30 PROCEDURE — 99900031 HC PATIENT EDUCATION (STAT)

## 2017-01-30 PROCEDURE — 96365 THER/PROPH/DIAG IV INF INIT: CPT

## 2017-01-30 PROCEDURE — 25000003 PHARM REV CODE 250: Performed by: SURGERY

## 2017-01-30 PROCEDURE — 99205 OFFICE O/P NEW HI 60 MIN: CPT | Mod: ,,, | Performed by: PSYCHIATRY & NEUROLOGY

## 2017-01-30 PROCEDURE — 27000339 *HC DAILY SUPPLY KIT

## 2017-01-30 PROCEDURE — 81000 URINALYSIS NONAUTO W/SCOPE: CPT

## 2017-01-30 PROCEDURE — 85730 THROMBOPLASTIN TIME PARTIAL: CPT

## 2017-01-30 PROCEDURE — 85610 PROTHROMBIN TIME: CPT

## 2017-01-30 PROCEDURE — 36415 COLL VENOUS BLD VENIPUNCTURE: CPT

## 2017-01-30 PROCEDURE — 80053 COMPREHEN METABOLIC PANEL: CPT

## 2017-01-30 PROCEDURE — 93005 ELECTROCARDIOGRAM TRACING: CPT

## 2017-01-30 PROCEDURE — 93010 ELECTROCARDIOGRAM REPORT: CPT | Mod: ,,, | Performed by: INTERNAL MEDICINE

## 2017-01-30 PROCEDURE — 94640 AIRWAY INHALATION TREATMENT: CPT

## 2017-01-30 PROCEDURE — 83880 ASSAY OF NATRIURETIC PEPTIDE: CPT

## 2017-01-30 PROCEDURE — 96361 HYDRATE IV INFUSION ADD-ON: CPT

## 2017-01-30 PROCEDURE — 83735 ASSAY OF MAGNESIUM: CPT

## 2017-01-30 PROCEDURE — 94761 N-INVAS EAR/PLS OXIMETRY MLT: CPT

## 2017-01-30 PROCEDURE — 87040 BLOOD CULTURE FOR BACTERIA: CPT

## 2017-01-30 PROCEDURE — 99291 CRITICAL CARE FIRST HOUR: CPT | Mod: 25

## 2017-01-30 PROCEDURE — 96367 TX/PROPH/DG ADDL SEQ IV INF: CPT

## 2017-01-30 PROCEDURE — 84100 ASSAY OF PHOSPHORUS: CPT

## 2017-01-30 PROCEDURE — 27000221 HC OXYGEN, UP TO 24 HOURS

## 2017-01-30 PROCEDURE — 84145 PROCALCITONIN (PCT): CPT

## 2017-01-30 RX ORDER — ATORVASTATIN CALCIUM 40 MG/1
40 TABLET, FILM COATED ORAL DAILY
Status: DISCONTINUED | OUTPATIENT
Start: 2017-01-30 | End: 2017-02-05 | Stop reason: HOSPADM

## 2017-01-30 RX ORDER — DILTIAZEM HCL 1 MG/ML
5 INJECTION, SOLUTION INTRAVENOUS
Status: DISCONTINUED | OUTPATIENT
Start: 2017-01-30 | End: 2017-01-30

## 2017-01-30 RX ORDER — LORAZEPAM 0.5 MG/1
0.5 TABLET ORAL EVERY 8 HOURS PRN
Status: DISCONTINUED | OUTPATIENT
Start: 2017-01-30 | End: 2017-02-05 | Stop reason: HOSPADM

## 2017-01-30 RX ORDER — ONDANSETRON 2 MG/ML
4 INJECTION INTRAMUSCULAR; INTRAVENOUS EVERY 8 HOURS PRN
Status: DISCONTINUED | OUTPATIENT
Start: 2017-01-30 | End: 2017-02-05 | Stop reason: HOSPADM

## 2017-01-30 RX ORDER — HYDROCHLOROTHIAZIDE 25 MG/1
25 TABLET ORAL EVERY OTHER DAY
Status: DISCONTINUED | OUTPATIENT
Start: 2017-01-31 | End: 2017-01-30

## 2017-01-30 RX ORDER — SOTALOL HYDROCHLORIDE 80 MG/1
40 TABLET ORAL 2 TIMES DAILY
Status: DISCONTINUED | OUTPATIENT
Start: 2017-01-30 | End: 2017-02-02

## 2017-01-30 RX ORDER — WARFARIN 2.5 MG/1
5 TABLET ORAL DAILY
Status: DISCONTINUED | OUTPATIENT
Start: 2017-01-30 | End: 2017-01-30

## 2017-01-30 RX ORDER — MULTIVITAMIN
1 TABLET ORAL DAILY
COMMUNITY

## 2017-01-30 RX ORDER — ACETAMINOPHEN 325 MG/1
650 TABLET ORAL EVERY 8 HOURS PRN
Status: DISCONTINUED | OUTPATIENT
Start: 2017-01-30 | End: 2017-02-05 | Stop reason: HOSPADM

## 2017-01-30 RX ORDER — METOPROLOL TARTRATE 1 MG/ML
5 INJECTION, SOLUTION INTRAVENOUS EVERY 5 MIN PRN
Status: DISCONTINUED | OUTPATIENT
Start: 2017-01-30 | End: 2017-02-05 | Stop reason: HOSPADM

## 2017-01-30 RX ORDER — IPRATROPIUM BROMIDE AND ALBUTEROL SULFATE 2.5; .5 MG/3ML; MG/3ML
3 SOLUTION RESPIRATORY (INHALATION) EVERY 6 HOURS
Status: DISCONTINUED | OUTPATIENT
Start: 2017-01-30 | End: 2017-02-05 | Stop reason: HOSPADM

## 2017-01-30 RX ORDER — PANTOPRAZOLE SODIUM 40 MG/1
40 TABLET, DELAYED RELEASE ORAL DAILY
Status: DISCONTINUED | OUTPATIENT
Start: 2017-01-30 | End: 2017-02-05 | Stop reason: HOSPADM

## 2017-01-30 RX ORDER — SODIUM CHLORIDE 9 MG/ML
1000 INJECTION, SOLUTION INTRAVENOUS
Status: COMPLETED | OUTPATIENT
Start: 2017-01-30 | End: 2017-01-30

## 2017-01-30 RX ORDER — LEVALBUTEROL 1.25 MG/.5ML
1.25 SOLUTION, CONCENTRATE RESPIRATORY (INHALATION) EVERY 6 HOURS PRN
Status: DISCONTINUED | OUTPATIENT
Start: 2017-01-30 | End: 2017-02-05 | Stop reason: HOSPADM

## 2017-01-30 RX ADMIN — IPRATROPIUM BROMIDE AND ALBUTEROL SULFATE 3 ML: .5; 3 SOLUTION RESPIRATORY (INHALATION) at 06:01

## 2017-01-30 RX ADMIN — WARFARIN SODIUM 5 MG: 2.5 TABLET ORAL at 04:01

## 2017-01-30 RX ADMIN — SOTALOL HYDROCHLORIDE 40 MG: 80 TABLET ORAL at 09:01

## 2017-01-30 RX ADMIN — SODIUM CHLORIDE 1000 ML: 0.9 INJECTION, SOLUTION INTRAVENOUS at 12:01

## 2017-01-30 RX ADMIN — AZITHROMYCIN MONOHYDRATE 500 MG: 500 INJECTION, POWDER, LYOPHILIZED, FOR SOLUTION INTRAVENOUS at 04:01

## 2017-01-30 RX ADMIN — SODIUM CHLORIDE 500 ML: 0.9 INJECTION, SOLUTION INTRAVENOUS at 10:01

## 2017-01-30 RX ADMIN — PANTOPRAZOLE SODIUM 40 MG: 40 TABLET, DELAYED RELEASE ORAL at 04:01

## 2017-01-30 RX ADMIN — ATORVASTATIN CALCIUM 40 MG: 40 TABLET, FILM COATED ORAL at 04:01

## 2017-01-30 RX ADMIN — CEFTRIAXONE 1 G: 1 INJECTION, SOLUTION INTRAVENOUS at 02:01

## 2017-01-30 NOTE — ASSESSMENT & PLAN NOTE
Was on coumadin as outpatient, INR 3.3  Will hold dose tomorrow (received today); daily INR. Home dose 5mg Mon, Wed, Fri and 2.5mg Tues, Thurs, Sat and Sun  On sotalol, will continue  Was not started on dilt gtt due to BP  IV metoprolol PRN for HR > 110  TTE ordered  CIS consulted

## 2017-01-30 NOTE — ASSESSMENT & PLAN NOTE
"Was brought to ER for this reason per ER records  Family not at bedside to give history  Patient reports "out of body experience" but can't elaborate further  Now alert and oriented to person, place, time  Neurology consulted  ??metabolic, infection vs cardiac with RVR    "

## 2017-01-30 NOTE — SUBJECTIVE & OBJECTIVE
Past Medical History   Diagnosis Date    A-fib     Anticoagulant long-term use     Hyperlipidemia     Hypertension     Pacemaker        History reviewed. No pertinent past surgical history.    Review of patient's allergies indicates:   Allergen Reactions    Pcn [penicillins]        No current facility-administered medications on file prior to encounter.      Current Outpatient Prescriptions on File Prior to Encounter   Medication Sig    atorvastatin (LIPITOR) 40 MG tablet Take 40 mg by mouth once daily.    hydrochlorothiazide (HYDRODIURIL) 25 MG tablet Take 25 mg by mouth. Take one every four days    sotalol (BETAPACE) 80 MG tablet Take 40 mg by mouth 2 (two) times daily.    warfarin (COUMADIN) 5 MG tablet Take 5 mg by mouth Daily. Take 5 mg Mondays, Wednesdays, Fridays.  Take 2.5mg on Tuesdays, Thursdays, Saturdays, Sundays    lorazepam (ATIVAN) 1 MG tablet Take 0.5 tablets (0.5 mg total) by mouth every 8 (eight) hours as needed for Anxiety.     Family History     None        Social History Main Topics    Smoking status: Never Smoker    Smokeless tobacco: Not on file    Alcohol use No    Drug use: No    Sexual activity: No     Review of Systems   Constitutional: Negative for activity change, fatigue, fever and unexpected weight change.   HENT: Negative for congestion, ear pain, hearing loss, rhinorrhea and sore throat.    Eyes: Negative for pain, redness and visual disturbance.   Respiratory: Negative for cough, shortness of breath and wheezing.    Cardiovascular: Negative for chest pain, palpitations and leg swelling.   Gastrointestinal: Negative for abdominal pain, constipation, diarrhea, nausea and vomiting.   Genitourinary: Negative for decreased urine volume, dysuria, frequency and urgency.   Musculoskeletal: Negative for back pain, joint swelling and neck pain.   Skin: Negative for color change, rash and wound.   Neurological: Negative for dizziness, tremors, weakness, light-headedness and  headaches.     Objective:     Vital Signs (Most Recent):  Temp: 98.3 °F (36.8 °C) (01/30/17 1600)  Pulse: 100 (01/30/17 1445)  Resp: 19 (01/30/17 1445)  BP: (!) 91/58 (01/30/17 1445)  SpO2: 96 % (01/30/17 1700) Vital Signs (24h Range):  Temp:  [96.8 °F (36 °C)-99.6 °F (37.6 °C)] 98.3 °F (36.8 °C)  Pulse:  [] 100  Resp:  [18-22] 19  SpO2:  [92 %-98 %] 96 %  BP: ()/(58-72) 91/58     Weight: 72.9 kg (160 lb 11.5 oz)  Body mass index is 25.17 kg/(m^2).    Physical Exam   Constitutional: He is oriented to person, place, and time. He appears well-developed and well-nourished. No distress.   HENT:   Head: Normocephalic and atraumatic.   Right Ear: External ear normal.   Left Ear: External ear normal.   Eyes: Conjunctivae and EOM are normal. Pupils are equal, round, and reactive to light. Right eye exhibits no discharge. Left eye exhibits no discharge.   Neck: Neck supple. No tracheal deviation present.   Cardiovascular: Exam reveals no gallop and no friction rub.    No murmur heard.  Tachycardic (rate 110) and irregulary irregular   Pulmonary/Chest: Effort normal. No respiratory distress. He has wheezes (diffuse). He has no rales.   Abdominal: Soft. Bowel sounds are normal. He exhibits no distension. There is no tenderness. There is no rebound and no guarding.   Musculoskeletal: He exhibits no edema.   Lymphadenopathy:     He has no cervical adenopathy.   Neurological: He is alert and oriented to person, place, and time. No cranial nerve deficit.   Skin: Skin is warm and dry.   Psychiatric: He has a normal mood and affect. His behavior is normal.   Nursing note and vitals reviewed.       Significant Labs:   CBC:   Recent Labs  Lab 01/30/17  0937   WBC 21.70*   HGB 15.2   HCT 44.2        CMP:   Recent Labs  Lab 01/30/17  0937      K 4.5      CO2 26   *   BUN 28*   CREATININE 1.1   CALCIUM 9.9   PROT 7.5   ALBUMIN 3.4*   BILITOT 1.2*   ALKPHOS 90   AST 26   ALT 22   ANIONGAP 9  "  EGFRNONAA 59*     Cardiac Markers:   Recent Labs  Lab 01/30/17  0937   *     Troponin:   Recent Labs  Lab 01/30/17  0937 01/30/17  1549   TROPONINI <0.006 0.013     Urine Studies:   Recent Labs  Lab 01/30/17  0930   COLORU Yellow   APPEARANCEUA Clear   PHUR 5.0   SPECGRAV 1.020   PROTEINUA 1+*   GLUCUA Negative   KETONESU 1+*   BILIRUBINUA 1+*   OCCULTUA Negative   NITRITE Negative   UROBILINOGEN 1.0   LEUKOCYTESUR Negative   RBCUA 0   WBCUA 10*   BACTERIA Occasional   HYALINECASTS 0     All pertinent labs within the past 24 hours have been reviewed.    Significant Imaging: I have reviewed all pertinent imaging results/findings within the past 24 hours.     CXR: "Chest, 1 view: The heart is moderately enlarged.  Calcified atheromatous disease affects the aorta.  Left-sided pacemaker devices in place.  The lungs are clear.  The skeletal structures are intact."    CT head: "Age-appropriate generalized cerebral volume loss with moderate degree of patchy decreased attenuation supratentorial white matter suggestive for chronic microvascular ischemic change similarly seen on the prior.     No evidence for acute intracranial hemorrhage or definite new abnormal parenchymal attenuation. Clinical correlation and further evaluation as warranted."  "

## 2017-01-30 NOTE — ED TRIAGE NOTES
"Here per [facility] after family called reporting patient "wasn't acting right" this am, patient AAOx 3 upon arrival, NAD.  "

## 2017-01-30 NOTE — H&P
"[facility] Medicine  History & Physical    Patient Name: Hernesto Sofia  MRN: [id number]  Admission Date: 1/30/2017  Attending Physician: Roxanne Pandey MD   Primary Care Provider: Hasmukh Pederson MD         Patient information was obtained from patient and ER records.     Subjective:     Principal Problem:Atrial fibrillation with RVR    Chief Complaint:  "out of body experience"     HPI: Patient presented to ER with confusion and shortness of breath. Was brought in by his daughter who is not at the bedside this evening. Patient reports he was having an "out of body experience" and that's why he was brought here. Doesn't give much more detail than this. He denies SOB, chest pains, palpitations, LE edema, orthopnea. Denies dysuria, hematuria but does report frequency and incomplete bladder emptying. He denies abdominal pain, n/v/d/c. He is alert and oriented this evening but is a poor historian.     Past Medical History   Diagnosis Date    A-fib     Anticoagulant long-term use     Hyperlipidemia     Hypertension     Pacemaker        History reviewed. No pertinent past surgical history.    Review of patient's allergies indicates:   Allergen Reactions    Pcn [penicillins]        No current facility-administered medications on file prior to encounter.      Current Outpatient Prescriptions on File Prior to Encounter   Medication Sig    atorvastatin (LIPITOR) 40 MG tablet Take 40 mg by mouth once daily.    hydrochlorothiazide (HYDRODIURIL) 25 MG tablet Take 25 mg by mouth. Take one every four days    sotalol (BETAPACE) 80 MG tablet Take 40 mg by mouth 2 (two) times daily.    warfarin (COUMADIN) 5 MG tablet Take 5 mg by mouth Daily. Take 5 mg Mondays, Wednesdays, Fridays.  Take 2.5mg on Tuesdays, Thursdays, Saturdays, Sundays    lorazepam (ATIVAN) 1 MG tablet Take 0.5 tablets (0.5 mg total) by mouth every 8 (eight) hours as needed for Anxiety.     Family History     None    "     Social History Main Topics    Smoking status: Never Smoker    Smokeless tobacco: Not on file    Alcohol use No    Drug use: No    Sexual activity: No     Review of Systems   Constitutional: Negative for activity change, fatigue, fever and unexpected weight change.   HENT: Negative for congestion, ear pain, hearing loss, rhinorrhea and sore throat.    Eyes: Negative for pain, redness and visual disturbance.   Respiratory: Negative for cough, shortness of breath and wheezing.    Cardiovascular: Negative for chest pain, palpitations and leg swelling.   Gastrointestinal: Negative for abdominal pain, constipation, diarrhea, nausea and vomiting.   Genitourinary: Negative for decreased urine volume, dysuria, frequency and urgency.   Musculoskeletal: Negative for back pain, joint swelling and neck pain.   Skin: Negative for color change, rash and wound.   Neurological: Negative for dizziness, tremors, weakness, light-headedness and headaches.     Objective:     Vital Signs (Most Recent):  Temp: 98.3 °F (36.8 °C) (01/30/17 1600)  Pulse: 100 (01/30/17 1445)  Resp: 19 (01/30/17 1445)  BP: (!) 91/58 (01/30/17 1445)  SpO2: 96 % (01/30/17 1700) Vital Signs (24h Range):  Temp:  [96.8 °F (36 °C)-99.6 °F (37.6 °C)] 98.3 °F (36.8 °C)  Pulse:  [] 100  Resp:  [18-22] 19  SpO2:  [92 %-98 %] 96 %  BP: ()/(58-72) 91/58     Weight: 72.9 kg (160 lb 11.5 oz)  Body mass index is 25.17 kg/(m^2).    Physical Exam   Constitutional: He is oriented to person, place, and time. He appears well-developed and well-nourished. No distress.   HENT:   Head: Normocephalic and atraumatic.   Right Ear: External ear normal.   Left Ear: External ear normal.   Eyes: Conjunctivae and EOM are normal. Pupils are equal, round, and reactive to light. Right eye exhibits no discharge. Left eye exhibits no discharge.   Neck: Neck supple. No tracheal deviation present.   Cardiovascular: Exam reveals no gallop and no friction rub.    No murmur  "heard.  Tachycardic (rate 110) and irregulary irregular   Pulmonary/Chest: Effort normal. No respiratory distress. He has wheezes (diffuse). He has no rales.   Abdominal: Soft. Bowel sounds are normal. He exhibits no distension. There is no tenderness. There is no rebound and no guarding.   Musculoskeletal: He exhibits no edema.   Lymphadenopathy:     He has no cervical adenopathy.   Neurological: He is alert and oriented to person, place, and time. No cranial nerve deficit.   Skin: Skin is warm and dry.   Psychiatric: He has a normal mood and affect. His behavior is normal.   Nursing note and vitals reviewed.       Significant Labs:   CBC:   Recent Labs  Lab 01/30/17  0937   WBC 21.70*   HGB 15.2   HCT 44.2        CMP:   Recent Labs  Lab 01/30/17  0937      K 4.5      CO2 26   *   BUN 28*   CREATININE 1.1   CALCIUM 9.9   PROT 7.5   ALBUMIN 3.4*   BILITOT 1.2*   ALKPHOS 90   AST 26   ALT 22   ANIONGAP 9   EGFRNONAA 59*     Cardiac Markers:   Recent Labs  Lab 01/30/17  0937   *     Troponin:   Recent Labs  Lab 01/30/17  0937 01/30/17  1549   TROPONINI <0.006 0.013     Urine Studies:   Recent Labs  Lab 01/30/17  0930   COLORU Yellow   APPEARANCEUA Clear   PHUR 5.0   SPECGRAV 1.020   PROTEINUA 1+*   GLUCUA Negative   KETONESU 1+*   BILIRUBINUA 1+*   OCCULTUA Negative   NITRITE Negative   UROBILINOGEN 1.0   LEUKOCYTESUR Negative   RBCUA 0   WBCUA 10*   BACTERIA Occasional   HYALINECASTS 0     All pertinent labs within the past 24 hours have been reviewed.    Significant Imaging: I have reviewed all pertinent imaging results/findings within the past 24 hours.     CXR: "Chest, 1 view: The heart is moderately enlarged.  Calcified atheromatous disease affects the aorta.  Left-sided pacemaker devices in place.  The lungs are clear.  The skeletal structures are intact."    CT head: "Age-appropriate generalized cerebral volume loss with moderate degree of patchy decreased attenuation " "supratentorial white matter suggestive for chronic microvascular ischemic change similarly seen on the prior.     No evidence for acute intracranial hemorrhage or definite new abnormal parenchymal attenuation. Clinical correlation and further evaluation as warranted."    Assessment/Plan:     * Atrial fibrillation with RVR  Was on coumadin as outpatient, INR 3.3  Will hold dose tomorrow (received today); daily INR. Home dose 5mg Mon, Wed, Fri and 2.5mg Tues, Thurs, Sat and Sun  On sotalol, will continue  Was not started on dilt gtt due to BP  IV metoprolol PRN for HR > 110  TTE ordered  CIS consulted      Leukocytosis  WBC 21K on admission  Source is unclear  UA is clear, CXR is negative  Blood cx sent  Started azithro and rocephin as wheezing and having some SOB now---but is this infection vs cardiac due to RVR??  BP low normal, was giving IVF but will stop for now  Repeat CXR in the AM  If fevers overnight will broaden coverage      HLD (hyperlipidemia)  Continue home atorvastatin      HTN (hypertension)  On HCTZ prior to admission  Hold until BP recovers (likely low due to RVR and ??infection)      Chronic right-sided headaches  This seems to be a chronic issue for him  CT head negative  ESR OK--less likely temporal arterities, not tender on exam  Can't do MRI due to PPM  Neurology was consulted in ED  Tylenol PRN for pain for now      Acute confusional state  Was brought to ER for this reason per ER records  Family not at bedside to give history  Patient reports "out of body experience" but can't elaborate further  Now alert and oriented to person, place, time  Neurology consulted  ??metabolic, infection vs cardiac with RVR      VTE Risk Mitigation         Ordered     Medium Risk of VTE  Once      01/30/17 1514     Place sequential compression device  Until discontinued      01/30/17 1514        Roxanne Pandey MD  Department of Hospital Medicine   [facility]  "

## 2017-01-30 NOTE — IP AVS SNAPSHOT
[address] [address]  Phone: [phone]           Patient Discharge Instructions     Our goal is to set you up for success. This packet includes information on your condition, medications, and your home care. It will help you to care for yourself so you don't get sicker and need to go back to the hospital.     Please ask your nurse if you have any questions.        There are many details to remember when preparing to leave the hospital. Here is what you will need to do:    1. Take your medicine. If you are prescribed medications, review your Medication List in the following pages. You may have new medications to  at the pharmacy and others that you'll need to stop taking. Review the instructions for how and when to take your medications. Talk with your doctor or nurses if you are unsure of what to do.     2. Go to your follow-up appointments. Specific follow-up information is listed in the following pages. Your may be contacted by a transition nurse or clinical provider about future appointments. Be sure we have all of the phone numbers to reach you, if needed. Please contact your provider's office if you are unable to make an appointment.     3. Watch for warning signs. Your doctor or nurse will give you detailed warning signs to watch for and when to call for assistance. These instructions may also include educational information about your condition. If you experience any of warning signs to your health, call your doctor.               ** Verify the list of medication(s) below is accurate and up to date. Carry this with you in case of emergency. If your medications have changed, please notify your healthcare provider.             Medication List      START taking these medications        Additional Info                      hydrocortisone 2.5 % rectal cream   Commonly known as:  ANUSOL-HC   Quantity:  30 g   Refills:  0    Last time this was given:   2/5/2017  9:36 AM   Instructions:  Place rectally 2 (two) times daily.     Begin Date    AM    Noon    PM    Bedtime       levoFLOXacin 750 MG tablet   Commonly known as:  LEVAQUIN   Quantity:  6 tablet   Refills:  0   Dose:  750 mg    Instructions:  Take 1 tablet (750 mg total) by mouth once daily.     Begin Date    AM    Noon    PM    Bedtime         CONTINUE taking these medications        Additional Info                      atorvastatin 40 MG tablet   Commonly known as:  LIPITOR   Refills:  0   Dose:  40 mg    Last time this was given:  40 mg on 2/5/2017  9:25 AM   Instructions:  Take 40 mg by mouth once daily.     Begin Date    AM    Noon    PM    Bedtime       hydrochlorothiazide 25 MG tablet   Commonly known as:  HYDRODIURIL   Refills:  0   Dose:  25 mg    Last time this was given:  25 mg on 2/5/2017  9:25 AM   Instructions:  Take 25 mg by mouth. Take one every four days     Begin Date    AM    Noon    PM    Bedtime       lorazepam 1 MG tablet   Commonly known as:  ATIVAN   Quantity:  10 tablet   Refills:  0   Dose:  0.5 mg    Instructions:  Take 0.5 tablets (0.5 mg total) by mouth every 8 (eight) hours as needed for Anxiety.     Begin Date    AM    Noon    PM    Bedtime       ONE DAILY MULTIVITAMIN per tablet   Refills:  0   Dose:  1 tablet   Generic drug:  multivitamin    Instructions:  Take 1 tablet by mouth once daily.     Begin Date    AM    Noon    PM    Bedtime       sotalol 80 MG tablet   Commonly known as:  BETAPACE   Refills:  0   Dose:  40 mg    Last time this was given:  80 mg on 2/5/2017  9:25 AM   Instructions:  Take 40 mg by mouth 2 (two) times daily.     Begin Date    AM    Noon    PM    Bedtime       warfarin 5 MG tablet   Commonly known as:  COUMADIN   Refills:  0   Dose:  5 mg    Last time this was given:  1 mg on 2/4/2017  6:00 PM   Instructions:  Take 5 mg by mouth Daily. Take 5 mg Mondays, Wednesdays, Fridays.  Take 2.5mg on Tuesdays, Thursdays, Saturdays, Sundays     Begin Date    AM     Noon    PM    Bedtime            Where to Get Your Medications      You can get these medications from any pharmacy     Bring a paper prescription for each of these medications     hydrocortisone 2.5 % rectal cream    levoFLOXacin 750 MG tablet                  Please bring to all follow up appointments:    1. A copy of your discharge instructions.  2. All medicines you are currently taking in their original bottles.  3. Identification and insurance card.    Please arrive 15 minutes ahead of scheduled appointment time.    Please call 24 hours in advance if you must reschedule your appointment and/or time.        Your Scheduled Appointments     Feb 17, 2017 11:00 AM CST   New Patient with Garrison Arizmendi MD   [facility])    [address] [facility] [address] [phone]              Follow-up Information     Follow up with Hasmukh Pederson MD.    Specialty:  Family Medicine    Why:  Outpatient Services    Contact information:    [address] [facility] [address]  [phone]          Follow up with [facility].    Specialty:  Home Health Services    Contact information:    [address] [facility] [address]  [phone]          Follow up with Hasmukh Pederson MD In 3 days.    Specialty:  Family Medicine    Contact information:    [address] [facility] [address]  [phone]          Follow up with Your PCP (regular doctor). Schedule an appointment as soon as possible for a visit on 2/7/2017.    Why:  Please schedule an appointment to follow-up with your regular doctor on Tuesday, Feb, 7 2017        Follow up with Rodolfo Suarez MD On 2/13/2017.    Specialties:  Pulmonary Disease, Internal Medicine    Why:  Please follow-up with Dr. Suarez on 2/13/17. No appointment needed, just show up anytime between 7:30AM and 9:00AM     Contact information:    [address] [place] [facility] [address]  [phone]          Discharge Instructions      "Future Orders    Protime-INR     Diet general     Questions:    Total calories:      Fat restriction, if any:      Protein restriction, if any:      Na restriction, if any:      Fluid restriction:      Additional restrictions:          Discharge Instructions       Call Dr Arizmendi's clinic at [phone] for any worsening head or neck pain.     Please follow-up with Dr. Carranza at his clinic on Monday, 2/13/17. No appointment needed. Just show up anytime between 7:30AM and 9:00AM    Discharge References/Attachments     MOBILITY: USING WALKER (ENGLISH)    ADULT, PNEUMONIA (ENGLISH)    ATRIAL FIBRILLATION, UNDERSTANDING (ENGLISH)        Primary Diagnosis     Your primary diagnosis was:  Atrial Fibrillation (Irregular Heartbeat)      Admission Information     Date & Time Provider Department CSN    1/30/2017  9:03 AM Roxanne Pandey MD [facility] [id number]      Care Providers     Provider Role Specialty Primary office phone    Roxanne Pandey MD Attending Provider Internal Medicine [phone]    Abel Covarrubias MD Consulting Physician  Cardiology [phone]    Garrison Arizmendi MD Consulting Physician  Neurology [phone]    Rodolfo Carranza MD Consulting Physician  Pulmonary Disease [phone]      Important Medicare Message          Most Recent Value    Important Message from Medicare Regarding Discharge Appeal Rights  Given to patient/caregiver, Explained to patient/caregiver, Signed/date by patient/caregiver yes 02/03/2017 1405      Your Vitals Were     BP Pulse Temp Resp Height Weight    124/94 (BP Location: Left arm, Patient Position: Lying, BP Method: Automatic) 107 98.4 °F (36.9 °C) (Oral) 18 5' 7" (1.702 m) 72.9 kg (160 lb 11.5 oz)    SpO2 BMI             93% 25.17 kg/m2         Recent Lab Values     No lab values to display.      Allergies as of 2/5/2017        Reactions    Pcn [Penicillins]       [facility]     [facility] Nurse Care Line - 24/7 Assistance  Unless " otherwise directed by your provider, please contact [facility], our nurse care line that is available for 24/7 assistance.     Registered nurses in the [facility] provide clinical advisement, health education, appointment booking, and other advisory services.  Call for this free service at [phone].        Advance Directives     An advance directive is a document which, in the event you are no longer able to make decisions for yourself, tells your healthcare team what kind of treatment you do or do not want to receive, or who you would like to make those decisions for you.  If you do not currently have an advance directive, [facility] encourages you to create one.  For more information call:  [phone]), [phone] ([phone]),  or log on to [email or web address].        Language Assistance Services     ATTENTION: Language assistance services are available, free of charge. Please call [phone].      ATENCIÓN: Si habla español, tiene a zuluaga disposición servicios gratuitos de asistencia lingüística. Llame al [phone].     CHÚ Ý: N?u b?n nói Ti?ng Vi?t, có các d?ch v? h? tr? ngôn ng? mi?n phí dành cho b?n. G?i s? [phone].        Pneumonmia Discharge Instructions                Coumadin Discharge Instructions                         [facility] Sign-Up     Activating your [facility] account is as easy as 1-2-3!     1) Visit [email or web address], select Sign Up Now, enter this activation code and your date of birth, then select Next.  [email or web address]  Expires: 3/3/2017  7:45 PM      2) Create a username and password to use when you visit [facility] in the future and select a security question in case you lose your password and select Next.    3) Enter your e-mail address and click Sign Up!    Additional Information  If you have questions, please e-mail [email or web address] or call [phone] to talk to our [facility] staff. Remember, [facility] is NOT to be used for  urgent needs. For medical emergencies, dial 911.          [facility] complies with applicable Federal civil rights laws and does not discriminate on the basis of race, color, national origin, age, disability, or sex.

## 2017-01-30 NOTE — ASSESSMENT & PLAN NOTE
This seems to be a chronic issue for him  CT head negative  ESR OK--less likely temporal arterities, not tender on exam  Can't do MRI due to PPM  Neurology was consulted in ED  Tylenol PRN for pain for now

## 2017-01-30 NOTE — ED PROVIDER NOTES
[facility]                                        January 30, 2017                     Chief Complaint  89 y.o. male with Altered Mental Status    History of Present Illness  Hernesto Sofia presents to the emergency room with altered mental status today  Patient was confused at home, family found him staring and confused prior to arrival  Patient has a systolic blood pressure in 80s and is tachycardic, atrial fibrillation RVR  Patient has had ongoing right-sided headaches or last 2 weeks, several negative CTs  Family states he has had cough/cold symptoms and has been gradually deteriorating  Patient on exam has rhonchi centrally with no active wheezing, 93% oxygenation here  Pt appears to be dehydrated with bronchitis versus pneumonia with atrial fibrillation    The history is provided by the patient  Past medical history: Atrial fibrillation, HTN, hyperlipidemia  Past surgical history: Pacemaker  ALLERGIES: Penicillin    Review of Systems and Physical Exam     Review of Systems  -- Constitution - no fever, denies fatigue, no weakness, no chills  -- Eyes - no tearing or redness, no visual disturbance  -- Ear, Nose - no tinnitus or earache, no nasal congestion or discharge  -- Mouth,Throat - no sore throat, no toothache, normal voice, normal swallowing  -- Respiratory - cough and congestion, no shortness of breath, no DONALDSON  -- Cardiovascular - denies chest pain, no palpitations, denies claudication  -- Gastrointestinal - denies abdominal pain, nausea, vomiting, or diarrhea  -- Musculoskeletal - denies back pain, negative for myalgias and arthralgias   -- Neurological - confusion, headache, denies weakness or seizure  -- Skin - denies pallor, rash, or changes in skin. no hives or welts noted    Vital Signs  -- Temperature is 98.5 °F (36.9 °C).   -- His blood pressure is 102/61 and his pulse is 99.   -- His respiration is 22 and oxygen saturation is 95%.      Physical Exam  -- Nursing note and  vitals reviewed  -- Head: Atraumatic. Normocephalic. No obvious abnormality  -- Eyes: Pupils are equal and reactive to light. Normal conjunctiva and lids  -- Nose: Nose normal in appearance, nares grossly normal. No discharge  -- Throat: Mucous membranes moist, pharynx normal, normal tonsils. No lesions   -- Ears: External ears and TM normal bilaterally. Normal hearing and no drainage  -- Neck: Normal range of motion. Neck supple. No masses, trachea midline  -- Cardiac: Normal rate, regular rhythm and normal heart sounds  -- Pulmonary: faint rhonchi at the bilateral bases with no active wheezing   -- Abdominal: Soft, no tenderness. Normal bowel sounds. Normal liver edge  -- Musculoskeletal: Normal range of motion, no effusions. Joints stable   -- Neurological: No focal deficits. Showed good interaction with staff  -- Vascular: Posterior tibial, dorsalis pedis and radial pulses 2+ bilaterally      Emergency Room Course     Treatment and Evaluation  -- The EKG findings today show atrial flutter with RVR  -- Chest x-ray showed no infiltrate and showed no acute pathology   -- The cardiac enzymes were within normal limits   -- Lactic Acid drawn in the ER today was normal   -- Blood cultures have also been drawn, results are pending   -- Urinalysis performed during this ER visit showed no signs of infection   -- The CT of the head performed in the ER today was negative for acute pathology     Abnormal lab values  -- Glucose 155 (*)   -- WBC 21.70 (*)   -- Gran% 83.0 (*)   -- Lymph% 8.3 (*)   --  (*)   -- INR 3.1 (*)   -- Phosphorus 2.3 (*)   -- WBC, UA 10 (*)   -- Sed Rate 33 (*)     Medications Given  -- cefTRIAXone (ROCEPHIN) 1 g in dextrose 5 % 50 mL IVPB    -- sodium chloride 0.9% bolus 500 mL (0 mLs Intravenous Stopped 1/30/17 1135)   -- 0.9%  NaCl infusion (1,000 mLs Intravenous New Bag 1/30/17 1211)     ED Management  -- Patient presents after a bout of confusion this morning, fine last night per  daughter  -- Patient would not answer questions this morning, had 17 bottles of Glucerna near him  -- Patient presents with a systolic blood pressure of 90, responded well to IV fluids  -- Patient had a white count of 21,000 with a normal lactate and a negative urine in ER  -- Patient has had cough and cold symptoms, presumed bronchitis per pulmonology  -- Patient was given IV antibiotics and IV fluids, much more alert prior to admission  -- I will consult neurology for the altered mental status this morning  -- Due to right-sided headache, neurology suggested ESR: 33  -- Neurology also asked for a MRI of the brain as an inpatient  -- I will consult pulmonology for the bronchitis issues  -- I will consult cardiology for the atrial fibrillation    Critical Care ED Physician Time (minutes):  -- Performed by: Cheo Lu M.D.  -- Date/Time: 2:21 PM 1/30/2017   -- Direct Patient Care (Face Time): 10  -- Additional History from Records or Taking Additional History: 10  -- Ordering, Reviewing, and Interpreting Diagnostic Studies: 10  -- Total Time in Documentation: 10  -- Consultation with Other Physicians: 10  -- Consultation with Family Related to Condition: 10  -- Total Critical Care Time: 60    Diagnosis  -- Atrial fibrillation with RVR.   -- Bronchitis  -- Leukocytosis    Disposition and Plan  -- Disposition: observation  -- Condition: stable  -- Telemetry monitoring  -- Morning labs  -- SCD hoses  -- Home medications  -- Nausea medication when necessary  -- Pain medication when necessary  -- Intravenous fluids  -- Routine monitoring  -- Bed rest until otherwise stated  -- Protonix for GERD prophylaxis  -- EKG in the morning  -- Cardiology consult  -- Serial cardiac enzymes  -- Neurology consult  -- MRI Brain  -- Pulmonology consult  -- Breathing treatments  -- IV antibiotics  -- Blood cultures pending    This note is dictated on Dragon Natural Speaking word recognition program.  There are word recognition mistakes  that are occasionally missed on review.                  Cheo Lu MD  01/30/17 7702

## 2017-01-30 NOTE — ASSESSMENT & PLAN NOTE
WBC 21K on admission  Source is unclear  UA is clear, CXR is negative  Blood cx sent  Started azithro and rocephin as wheezing and having some SOB now---but is this infection vs cardiac due to RVR??  BP low normal, was giving IVF but will stop for now  Repeat CXR in the AM  If fevers overnight will broaden coverage

## 2017-01-31 LAB
ALBUMIN SERPL BCP-MCNC: 2.7 G/DL
ALP SERPL-CCNC: 67 U/L
ALT SERPL W/O P-5'-P-CCNC: 15 U/L
AMMONIA PLAS-SCNC: 19 UMOL/L
ANION GAP SERPL CALC-SCNC: 8 MMOL/L
AORTIC VALVE STENOSIS: ABNORMAL
AST SERPL-CCNC: 19 U/L
BASOPHILS # BLD AUTO: 0.04 K/UL
BASOPHILS NFR BLD: 0.3 %
BILIRUB SERPL-MCNC: 1.9 MG/DL
BNP SERPL-MCNC: 327 PG/ML
BUN SERPL-MCNC: 28 MG/DL
CALCIUM SERPL-MCNC: 8.9 MG/DL
CHLORIDE SERPL-SCNC: 106 MMOL/L
CO2 SERPL-SCNC: 24 MMOL/L
CREAT SERPL-MCNC: 0.9 MG/DL
DIFFERENTIAL METHOD: ABNORMAL
EOSINOPHIL # BLD AUTO: 0 K/UL
EOSINOPHIL NFR BLD: 0.1 %
ERYTHROCYTE [DISTWIDTH] IN BLOOD BY AUTOMATED COUNT: 13.3 %
EST. GFR  (AFRICAN AMERICAN): >60 ML/MIN/1.73 M^2
EST. GFR  (NON AFRICAN AMERICAN): >60 ML/MIN/1.73 M^2
ESTIMATED PA SYSTOLIC PRESSURE: 30.69
GLUCOSE SERPL-MCNC: 114 MG/DL
HCT VFR BLD AUTO: 36.5 %
HGB BLD-MCNC: 12.4 G/DL
INR PPP: 2.8
LYMPHOCYTES # BLD AUTO: 2.3 K/UL
LYMPHOCYTES NFR BLD: 15.4 %
MCH RBC QN AUTO: 32.5 PG
MCHC RBC AUTO-ENTMCNC: 34 %
MCV RBC AUTO: 96 FL
MITRAL VALVE REGURGITATION: ABNORMAL
MONOCYTES # BLD AUTO: 1.9 K/UL
MONOCYTES NFR BLD: 12.8 %
NEUTROPHILS # BLD AUTO: 10.7 K/UL
NEUTROPHILS NFR BLD: 71.4 %
PLATELET # BLD AUTO: 153 K/UL
PMV BLD AUTO: 10.6 FL
POTASSIUM SERPL-SCNC: 4.1 MMOL/L
PROT SERPL-MCNC: 6.1 G/DL
PROTHROMBIN TIME: 27.8 SEC
RBC # BLD AUTO: 3.82 M/UL
RETIRED EF AND QEF - SEE NOTES: 50 (ref 55–65)
SODIUM SERPL-SCNC: 138 MMOL/L
TRICUSPID VALVE REGURGITATION: ABNORMAL
TROPONIN I SERPL DL<=0.01 NG/ML-MCNC: 0.01 NG/ML
TROPONIN I SERPL DL<=0.01 NG/ML-MCNC: 0.02 NG/ML
WBC # BLD AUTO: 14.97 K/UL

## 2017-01-31 PROCEDURE — 25000003 PHARM REV CODE 250: Performed by: INTERNAL MEDICINE

## 2017-01-31 PROCEDURE — 99232 SBSQ HOSP IP/OBS MODERATE 35: CPT | Mod: ,,, | Performed by: INTERNAL MEDICINE

## 2017-01-31 PROCEDURE — 36415 COLL VENOUS BLD VENIPUNCTURE: CPT

## 2017-01-31 PROCEDURE — 27000339 *HC DAILY SUPPLY KIT

## 2017-01-31 PROCEDURE — 93306 TTE W/DOPPLER COMPLETE: CPT

## 2017-01-31 PROCEDURE — 83880 ASSAY OF NATRIURETIC PEPTIDE: CPT

## 2017-01-31 PROCEDURE — 63600175 PHARM REV CODE 636 W HCPCS: Performed by: SURGERY

## 2017-01-31 PROCEDURE — 25000242 PHARM REV CODE 250 ALT 637 W/ HCPCS: Performed by: INTERNAL MEDICINE

## 2017-01-31 PROCEDURE — 94640 AIRWAY INHALATION TREATMENT: CPT

## 2017-01-31 PROCEDURE — 82607 VITAMIN B-12: CPT

## 2017-01-31 PROCEDURE — 94761 N-INVAS EAR/PLS OXIMETRY MLT: CPT

## 2017-01-31 PROCEDURE — 27200120 HC KIT IV START (RUSH ONLY)

## 2017-01-31 PROCEDURE — 25000003 PHARM REV CODE 250: Performed by: SURGERY

## 2017-01-31 PROCEDURE — 11000001 HC ACUTE MED/SURG PRIVATE ROOM

## 2017-01-31 PROCEDURE — 82140 ASSAY OF AMMONIA: CPT

## 2017-01-31 PROCEDURE — 95819 EEG AWAKE AND ASLEEP: CPT

## 2017-01-31 PROCEDURE — 96366 THER/PROPH/DIAG IV INF ADDON: CPT

## 2017-01-31 PROCEDURE — 99233 SBSQ HOSP IP/OBS HIGH 50: CPT | Mod: ,,, | Performed by: PSYCHIATRY & NEUROLOGY

## 2017-01-31 PROCEDURE — 84484 ASSAY OF TROPONIN QUANT: CPT | Mod: 91

## 2017-01-31 PROCEDURE — 93005 ELECTROCARDIOGRAM TRACING: CPT

## 2017-01-31 PROCEDURE — 80053 COMPREHEN METABOLIC PANEL: CPT

## 2017-01-31 PROCEDURE — 85610 PROTHROMBIN TIME: CPT

## 2017-01-31 PROCEDURE — 85025 COMPLETE CBC W/AUTO DIFF WBC: CPT

## 2017-01-31 RX ORDER — WARFARIN 2.5 MG/1
2.5 TABLET ORAL DAILY
Status: DISCONTINUED | OUTPATIENT
Start: 2017-01-31 | End: 2017-02-01

## 2017-01-31 RX ORDER — SODIUM CHLORIDE 9 MG/ML
INJECTION, SOLUTION INTRAVENOUS CONTINUOUS
Status: DISCONTINUED | OUTPATIENT
Start: 2017-01-31 | End: 2017-02-01

## 2017-01-31 RX ORDER — TRAMADOL HYDROCHLORIDE 50 MG/1
50 TABLET ORAL EVERY 6 HOURS PRN
Status: DISCONTINUED | OUTPATIENT
Start: 2017-01-31 | End: 2017-02-05 | Stop reason: HOSPADM

## 2017-01-31 RX ORDER — TRAMADOL HYDROCHLORIDE 50 MG/1
50 TABLET ORAL EVERY 6 HOURS PRN
Status: DISCONTINUED | OUTPATIENT
Start: 2017-01-31 | End: 2017-01-31

## 2017-01-31 RX ADMIN — CEFTRIAXONE 1 G: 1 INJECTION, SOLUTION INTRAVENOUS at 01:01

## 2017-01-31 RX ADMIN — METOPROLOL TARTRATE 5 MG: 5 INJECTION INTRAVENOUS at 04:01

## 2017-01-31 RX ADMIN — IPRATROPIUM BROMIDE AND ALBUTEROL SULFATE 3 ML: .5; 3 SOLUTION RESPIRATORY (INHALATION) at 07:01

## 2017-01-31 RX ADMIN — PANTOPRAZOLE SODIUM 40 MG: 40 TABLET, DELAYED RELEASE ORAL at 08:01

## 2017-01-31 RX ADMIN — SOTALOL HYDROCHLORIDE 40 MG: 80 TABLET ORAL at 09:01

## 2017-01-31 RX ADMIN — SODIUM CHLORIDE: 0.9 INJECTION, SOLUTION INTRAVENOUS at 04:01

## 2017-01-31 RX ADMIN — WARFARIN SODIUM 2.5 MG: 2.5 TABLET ORAL at 04:01

## 2017-01-31 RX ADMIN — ATORVASTATIN CALCIUM 40 MG: 40 TABLET, FILM COATED ORAL at 08:01

## 2017-01-31 RX ADMIN — IPRATROPIUM BROMIDE AND ALBUTEROL SULFATE 3 ML: .5; 3 SOLUTION RESPIRATORY (INHALATION) at 12:01

## 2017-01-31 RX ADMIN — AZITHROMYCIN MONOHYDRATE 500 MG: 500 INJECTION, POWDER, LYOPHILIZED, FOR SOLUTION INTRAVENOUS at 03:01

## 2017-01-31 RX ADMIN — TRAMADOL HYDROCHLORIDE 50 MG: 50 TABLET, FILM COATED ORAL at 04:01

## 2017-01-31 RX ADMIN — IPRATROPIUM BROMIDE AND ALBUTEROL SULFATE 3 ML: .5; 3 SOLUTION RESPIRATORY (INHALATION) at 01:01

## 2017-01-31 NOTE — ASSESSMENT & PLAN NOTE
Being treated for bronchitis. Mental status improving with treatment of acute infectious issues. Watch for continued clinical improvement.

## 2017-01-31 NOTE — PLAN OF CARE
Report given to Inge RN., verbalized understanding of all information. Summer assumed care of pt.

## 2017-01-31 NOTE — NURSING
Spoke with Dr. Singh about pts MEWs score. IVF ordered for blood pressure. Will continue to monitor pts condition and vital signs.

## 2017-01-31 NOTE — SUBJECTIVE & OBJECTIVE
Subjective:     Interval History: Patient is less confused per sitter at bedside. Patient continues to have pain in the right face, right temple or right neck.         Current Facility-Administered Medications   Medication Dose Route Frequency Provider Last Rate Last Dose    0.9%  NaCl infusion   Intravenous Continuous Billie Singh MD 75 mL/hr at 01/31/17 1625      acetaminophen tablet 650 mg  650 mg Oral Q8H PRN Cheo Lu MD        albuterol-ipratropium 2.5mg-0.5mg/3mL nebulizer solution 3 mL  3 mL Nebulization Q6H Rodolfo Carranza MD   3 mL at 01/31/17 1320    atorvastatin tablet 40 mg  40 mg Oral Daily Cheo Lu MD   40 mg at 01/31/17 0836    azithromycin 500 mg in dextrose 5 % 250 mL IVPB (ready to mix system)  500 mg Intravenous Q24H Cheo Lu  mL/hr at 01/31/17 1523 500 mg at 01/31/17 1523    cefTRIAXone (ROCEPHIN) 1 g in dextrose 5 % 50 mL IVPB  1 g Intravenous Q24H Cheo Lu MD   Stopped at 01/31/17 1455    influenza vaccine 180 mcg/0.5 mL (for patients > 65) injection  0.5 mL Intramuscular vaccine x 1 dose Billie Singh MD        levalbuterol nebulizer solution 1.25 mg  1.25 mg Nebulization Q6H PRN Cheo Lu MD        lorazepam tablet 0.5 mg  0.5 mg Oral Q8H PRN Cheo Lu MD        metoprolol injection 5 mg  5 mg Intravenous Q5 Min PRN Roxanne Pandey MD   5 mg at 01/31/17 1619    ondansetron injection 4 mg  4 mg Intravenous Q8H PRN Cheo Lu MD        pantoprazole EC tablet 40 mg  40 mg Oral Daily Cheo Lu MD   40 mg at 01/31/17 0836    promethazine (PHENERGAN) 12.5 mg in dextrose 5 % 50 mL IVPB  12.5 mg Intravenous Q6H PRN Cheo Lu MD        sotalol split tablet 40 mg  40 mg Oral BID Billie Singh MD   Stopped at 01/31/17 0836    tramadol tablet 50 mg  50 mg Oral Q6H PRN Roxanne Pandey MD   50 mg at 01/31/17 1636    warfarin (COUMADIN) tablet 2.5 mg  2.5 mg Oral Daily Billie Singh MD    2.5 mg at 01/31/17 1636       Review of Systems   Constitutional: Negative for fatigue.   HENT: Negative for drooling.    Eyes: Negative for photophobia.   Respiratory: Negative for wheezing.    Cardiovascular: Negative for chest pain.   Gastrointestinal: Negative for blood in stool.   Genitourinary: Negative for hematuria.   Musculoskeletal: Positive for neck pain.   Neurological: Negative for tremors.   Psychiatric/Behavioral: Negative for hallucinations.     Objective:     Vital Signs (Most Recent):  Temp: (!) 100.6 °F (38.1 °C) (01/31/17 1527)  Pulse: (!) 113 (01/31/17 1527)  Resp: 20 (01/31/17 1527)  BP: 93/74 (01/31/17 1527)  SpO2: 97 % (01/31/17 1527) Vital Signs (24h Range):  Temp:  [96.9 °F (36.1 °C)-100.6 °F (38.1 °C)] 100.6 °F (38.1 °C)  Pulse:  [] 113  Resp:  [2-29] 20  SpO2:  [90 %-99 %] 97 %  BP: ()/(49-77) 93/74     Weight: 72.9 kg (160 lb 11.5 oz)  Body mass index is 25.17 kg/(m^2).    Physical Exam           Gen Appearance, well developed/nourished in no apparent distress  CV: 2+ distal pulses with no edema or swelling  Neuro:  MS: Awake, alert, oriented to place when prompted, person,but not to  time, situation. Sustains attention. Recent recall is forgotten/remote memory intact, Language is full to spontaneous speech/repetition/naming/comprehension. Fund of Knowledge is likely less than expected  CN: Optic discs are flat with normal vasculature, PERRL, Extraoccular movements and visual fields are full. Normal facial sensation and strength, Hearing symmetric, Tongue and Palate are midline and strong. Shoulder Shrug symmetric and strong.  Motor: Normal bulk, tone, no abnormal movements. 5/5 strength bilateral upper/lower extremities with 1+ reflexes and no clonus  Sensory: symmetric to temp, and vibration Romberg not done  Cerebellar: Finger-nose,Heal-shin, Rapid alternating movements intact  Gait: Not done. Patient usually ambulates with a walker       Significant Labs: WBC improved  greatly today  Creatinine normal  Note bilirubin increase    Significant Imaging: Xray C spine: degenerative changes

## 2017-01-31 NOTE — ASSESSMENT & PLAN NOTE
-Improved with treatment of bronchitis per pulmonology  -Check EEG  -History of AMS with prior acute metabolic or infectious illness in this patient  -Can likely avoid further work up if patient's exam and lab findings continue to improve with current treatment.

## 2017-01-31 NOTE — PROGRESS NOTES
[facility]  Neurology  Progress Note    Patient Name: Hernesto Sofia  MRN: [id number]  Admission Date: 1/30/2017  Hospital Length of Stay: 0 days  Code Status: Full Code   Attending Provider: Roxanne Pandey MD  Primary Care Physician: Hasmukh Pederson MD   Principal Problem:Atrial fibrillation with RVR      Subjective:     Interval History: Patient is less confused per sitter at bedside. Patient continues to have pain in the right face, right temple or right neck.         Current Facility-Administered Medications   Medication Dose Route Frequency Provider Last Rate Last Dose    0.9%  NaCl infusion   Intravenous Continuous Billie Singh MD 75 mL/hr at 01/31/17 1625      acetaminophen tablet 650 mg  650 mg Oral Q8H PRN Cheo Lu MD        albuterol-ipratropium 2.5mg-0.5mg/3mL nebulizer solution 3 mL  3 mL Nebulization Q6H Rodolfo Carranza MD   3 mL at 01/31/17 1320    atorvastatin tablet 40 mg  40 mg Oral Daily Cheo Lu MD   40 mg at 01/31/17 0836    azithromycin 500 mg in dextrose 5 % 250 mL IVPB (ready to mix system)  500 mg Intravenous Q24H Cheo Lu  mL/hr at 01/31/17 1523 500 mg at 01/31/17 1523    cefTRIAXone (ROCEPHIN) 1 g in dextrose 5 % 50 mL IVPB  1 g Intravenous Q24H Cheo Lu MD   Stopped at 01/31/17 1455    influenza vaccine 180 mcg/0.5 mL (for patients > 65) injection  0.5 mL Intramuscular vaccine x 1 dose Billie Singh MD        levalbuterol nebulizer solution 1.25 mg  1.25 mg Nebulization Q6H PRN Cheo Lu MD        lorazepam tablet 0.5 mg  0.5 mg Oral Q8H PRN Cheo Lu MD        metoprolol injection 5 mg  5 mg Intravenous Q5 Min PRN Roxanne Pandey MD   5 mg at 01/31/17 1619    ondansetron injection 4 mg  4 mg Intravenous Q8H PRN Cheo Lu MD        pantoprazole EC tablet 40 mg  40 mg Oral Daily Cheo Lu MD   40 mg at 01/31/17 0836    promethazine (PHENERGAN) 12.5 mg in dextrose 5 % 50 mL  IVPB  12.5 mg Intravenous Q6H PRN Cheo Lu MD        sotalol split tablet 40 mg  40 mg Oral BID Billie Singh MD   Stopped at 01/31/17 0836    tramadol tablet 50 mg  50 mg Oral Q6H PRN Roxanne Pandey MD   50 mg at 01/31/17 1636    warfarin (COUMADIN) tablet 2.5 mg  2.5 mg Oral Daily Billie Singh MD   2.5 mg at 01/31/17 1636       Review of Systems   Constitutional: Negative for fatigue.   HENT: Negative for drooling.    Eyes: Negative for photophobia.   Respiratory: Negative for wheezing.    Cardiovascular: Negative for chest pain.   Gastrointestinal: Negative for blood in stool.   Genitourinary: Negative for hematuria.   Musculoskeletal: Positive for neck pain.   Neurological: Negative for tremors.   Psychiatric/Behavioral: Negative for hallucinations.     Objective:     Vital Signs (Most Recent):  Temp: (!) 100.6 °F (38.1 °C) (01/31/17 1527)  Pulse: (!) 113 (01/31/17 1527)  Resp: 20 (01/31/17 1527)  BP: 93/74 (01/31/17 1527)  SpO2: 97 % (01/31/17 1527) Vital Signs (24h Range):  Temp:  [96.9 °F (36.1 °C)-100.6 °F (38.1 °C)] 100.6 °F (38.1 °C)  Pulse:  [] 113  Resp:  [2-29] 20  SpO2:  [90 %-99 %] 97 %  BP: ()/(49-77) 93/74     Weight: 72.9 kg (160 lb 11.5 oz)  Body mass index is 25.17 kg/(m^2).    Physical Exam           Gen Appearance, well developed/nourished in no apparent distress  CV: 2+ distal pulses with no edema or swelling  Neuro:  MS: Awake, alert, oriented to place when prompted, person,but not to  time, situation. Sustains attention. Recent recall is forgotten/remote memory intact, Language is full to spontaneous speech/repetition/naming/comprehension. Fund of Knowledge is likely less than expected  CN: Optic discs are flat with normal vasculature, PERRL, Extraoccular movements and visual fields are full. Normal facial sensation and strength, Hearing symmetric, Tongue and Palate are midline and strong. Shoulder Shrug symmetric and strong.  Motor: Normal bulk, tone,  no abnormal movements. 5/5 strength bilateral upper/lower extremities with 1+ reflexes and no clonus  Sensory: symmetric to temp, and vibration Romberg not done  Cerebellar: Finger-nose,Heal-shin, Rapid alternating movements intact  Gait: Not done. Patient usually ambulates with a walker       Significant Labs: WBC improved greatly today  Creatinine normal  Note bilirubin increase    Significant Imaging: Xray C spine: degenerative changes    Assessment and Plan:     * Atrial fibrillation with RVR  On anticoagulation with coumadin for CVA prevention. No signs of CVA on exam. Can't have MRI due to pacemaker in situ    Leukocytosis  - improved today on treatment for  bronchitis. Mental status improving with treatment of acute infectious issues. Watch for continued clinical improvement.       Acute confusional state  -Will review EEG when able (not available to date)  -History of AMS with prior acute metabolic or infectious illness in this patient  -Check Ammonia level, B12  -Otherwise, Can likely avoid further work up if patient's exam and lab findings continue to improve with current treatment.     Cervical radicular pain  -Patient reporting 3 weeks of pain radiating to and from the right neck with right sided headaches. Does not seem related to more acute issues/ seems like Neuralgia pain. Does not seem like primary headache disorder. No history of trauma.   -C spine xray showed degenerative changes  -Check CTA head and neck to rule out vascular cause. Will also look further at structures of C spine.   -Consider prevention agent like low dose gabapentin if neuralgia/radicular pain continues and if mental status improves. .   -?Trigeminal neuralgia vs cervical radicular pain in differential. History difficult.     Will see patient Thursday. Call if acute changes prior.   Garrison Arizmendi MD  Neurology  [facility]

## 2017-01-31 NOTE — ASSESSMENT & PLAN NOTE
Patient reporting 3 weeks of pain radiating to and from the right neck. Does not seem related to more acute issues. Does not seem like primary headache disorder. No history of trauma. Check C spine xray.

## 2017-01-31 NOTE — EICU
Video assessment completed.  Pt sitting up in bed, AAO.  Family and PCT at bedside.  No drips in progress.  Pt noted a-fib on monitor with .  NAD noted.

## 2017-01-31 NOTE — ASSESSMENT & PLAN NOTE
"Was brought to ER for this reason per ER records  Family not at bedside to give history  Patient reports "out of body experience" but can't elaborate further  Now alert and oriented to person, place, time  Neurology consulted  ??metabolic, infection vs cardiac with RVR    EEG today     "

## 2017-01-31 NOTE — ASSESSMENT & PLAN NOTE
On anticoagulation with coumadin for CVA prevention. No signs of CVA on exam. Can't have MRI due to pacemaker in situ

## 2017-01-31 NOTE — CONSULTS
[facility]  Cardiology  Consult Note    Patient Name: Hernesto Sofia  MRN: [id number]  Admission Date: 1/30/2017  Hospital Length of Stay: 0 days  Code Status: Full Code   Attending Provider: Roxanne Pandey MD   Consulting Provider: SUSHMA Waller  Primary Care Physician: Hasmukh Pederson MD  Principal Problem:Atrial fibrillation with RVR    Patient information was obtained from patient, relative(s), past medical records and ER records.     Inpatient consult to Cardiology-[facility]  Consult performed by: BONI KELLY  Consult ordered by: CHEO CAST        Subjective:     Chief Complaint:  Decreased LOC, A fib     HPI: 89 year old w/m with HX of chronic A fib, HLD, HTN presents for decreased LOC. He was found by caregiver slumped down in chair and was difficult to arouse. Patient was noted to have a WBC count of 21,000 and was started on ABX therapy. Initially he was noted to be in A fib with RVR with a rate of 110. His rate is now relatively well controlled consistently below 100. He is now awake and alert. He denies chest pain, palpitations, or SOB at this time.     Past Medical History   Diagnosis Date    A-fib     Anticoagulant long-term use     Hyperlipidemia     Hypertension     Pacemaker        History reviewed. No pertinent past surgical history.    Review of patient's allergies indicates:   Allergen Reactions    Pcn [penicillins]        No current facility-administered medications on file prior to encounter.      Current Outpatient Prescriptions on File Prior to Encounter   Medication Sig    atorvastatin (LIPITOR) 40 MG tablet Take 40 mg by mouth once daily.    hydrochlorothiazide (HYDRODIURIL) 25 MG tablet Take 25 mg by mouth. Take one every four days    sotalol (BETAPACE) 80 MG tablet Take 40 mg by mouth 2 (two) times daily.    warfarin (COUMADIN) 5 MG tablet Take 5 mg by mouth Daily. Take 5 mg Mondays, Wednesdays, Fridays.  Take 2.5mg on Tuesdays, Thursdays,  Saturdays, Sundays    lorazepam (ATIVAN) 1 MG tablet Take 0.5 tablets (0.5 mg total) by mouth every 8 (eight) hours as needed for Anxiety.     Family History     None        Social History Main Topics    Smoking status: Never Smoker    Smokeless tobacco: Not on file    Alcohol use No    Drug use: No    Sexual activity: No     Review of Systems   Constitutional: Negative   Eyes: Negative    Respiratory: Negative    Cardiovascular: Negative   Gastrointestinal: Negative   Genitourinary: Negative   Musculoskeletal: Negative   Skin: Negative .   Neurological: Negative   Objective:     Vital Signs (Most Recent):  Temp: 100.1 °F (37.8 °C) (01/31/17 0726)  Pulse: 85 (01/31/17 0739)  Resp: 20 (01/31/17 0739)  BP: 100/62 (01/31/17 0600)  SpO2: (!) 94 % (01/31/17 0739) Vital Signs (24h Range):  Temp:  [96.8 °F (36 °C)-100.1 °F (37.8 °C)] 100.1 °F (37.8 °C)  Pulse:  [] 85  Resp:  [2-29] 20  SpO2:  [90 %-99 %] 94 %  BP: ()/(49-73) 100/62     Weight: 72.9 kg (160 lb 11.5 oz)  Body mass index is 25.17 kg/(m^2).    SpO2: (!) 94 %  O2 Device (Oxygen Therapy): room air      Intake/Output Summary (Last 24 hours) at 01/31/17 0911  Last data filed at 01/31/17 0600   Gross per 24 hour   Intake             1640 ml   Output              300 ml   Net             1340 ml       Lines/Drains/Airways     Peripheral Intravenous Line                 Peripheral IV - Single Lumen 01/30/17 1041 Right Forearm less than 1 day                Physical Exam   General appearance: alert, appears stated age and cooperative  Head: Normocephalic, without obvious abnormality, atraumatic  Eyes: conjunctivae/corneas clear. PERRL  Neck: no carotid bruit, no JVD and supple, symmetrical, trachea midline  Lungs: clear to auscultation bilaterally, normal respiratory effort  Chest Wall: no tenderness  Heart: irregular rhythm controlled rate, S1, S2 normal  Abdomen: soft, non-tender; bowel sounds normal; no masses,  no organomegaly  Extremities:  Extremities normal, atraumatic, no cyanosis, clubbing, or edema  Pulses: Dorsalis Pedis R: 2+ (normal)/L: 2+ (normal)  Skin: Skin color, texture, turgor normal. No rashes or lesions  Neurologic: Normal mood and affect  Awake alert oriented to person and place       Assessment and Plan:   AMS in a frail 89 yo; currently appears stable without c/o    Chronic A fib; rate somewhat elevated because of infectious process  S/p PM for SSS  HTN controlled LVEF 50% LVH AS 1.3 Mild MR, PAS 38 3/12  Normal streess perf 2009   Leukocytosis    PLAN:continue atorvastatin 40, HCTZ 25, sotalol 40 bid  Adjust coumadin, hold for 1 day, then 2.5 mg qd  May go to floor on tele  Neuro consult      Active Diagnoses:    Diagnosis Date Noted POA    PRINCIPAL PROBLEM:  Atrial fibrillation with RVR [I48.91] 01/30/2017 Yes    Leukocytosis [D72.829] 01/30/2017 Yes    HLD (hyperlipidemia) [E78.5] 01/30/2017 Yes    HTN (hypertension) [I10] 01/30/2017 Yes    Chronic right-sided headaches [R51] 01/30/2017 Yes    Acute confusional state [F05] 01/30/2017 Yes    Cervical radicular pain [M54.12] 01/30/2017 Yes      Problems Resolved During this Admission:    Diagnosis Date Noted Date Resolved POA       VTE Risk Mitigation         Ordered     Medium Risk of VTE  Once      01/30/17 1514     Place sequential compression device  Until discontinued      01/30/17 1514          London Valencia, SUSHMA  Cardiology   [facility]  I attest that I have personally seen and examined this patient. I have reviewed and discussed the management in detail as outlined above.

## 2017-01-31 NOTE — PLAN OF CARE
Problem: Arrhythmia/Dysrhythmia (Symptomatic) (Adult)  Intervention: Monitor/Manage Cardiac Rhythm Disturbance  Pt admitted with Afib rvr and elevated white count,  Vs stable without starting Cardizem gtt,  Pt alert and oriented,  No confusion or deficit noted,  content of urine,  Skin intact and without breakdown or open areas,  Without fall or injury   poc reviewed with pt and daughter,  Verbalized understanding

## 2017-01-31 NOTE — PROGRESS NOTES
"Hernesto Sofia is a 89 y.o. male patient.looks great    Active Hospital Problems    Diagnosis  POA    *Atrial fibrillation with RVR [I48.91]  Yes    Leukocytosis [D72.829]  Yes    HLD (hyperlipidemia) [E78.5]  Yes    HTN (hypertension) [I10]  Yes    Chronic right-sided headaches [R51]  Yes    Acute confusional state [F05]  Yes    Cervical radicular pain [M54.12]  Yes      Resolved Hospital Problems    Diagnosis Date Resolved POA   No resolved problems to display.     Temp: 100.1 °F (37.8 °C) (01/31/17 0726)  Pulse: 85 (01/31/17 0739)  Resp: 20 (01/31/17 0739)  BP: 100/62 (01/31/17 0600)  SpO2: (!) 94 % (01/31/17 0739)  Weight: 72.9 kg (160 lb 11.5 oz) (01/30/17 1445)  Height: 5' 7" (170.2 cm) (01/30/17 1445)    Subjective:  Symptoms:  Worsening.  He reports shortness of breath, cough, chest pain, weakness and anxiety.    Diet:  Adequate intake.    Activity level: Impaired due to weakness.    Pain:  He complains of pain that is mild.      Objective:  General Appearance:  Ill-appearing.    Vital signs: (most recent): Blood pressure 100/62, pulse 85, temperature 100.1 °F (37.8 °C), temperature source Oral, resp. rate 20, height 5' 7" (1.702 m), weight 72.9 kg (160 lb 11.5 oz), SpO2 (!) 94 %.  Fever.    Output: Producing urine and producing stool.    Lungs:  Tachypnea.  He is in respiratory distress.  There are wheezes, rhonchi and decreased breath sounds.    Heart: Tachycardia.  Regular rhythm.  S1 normal and S2 normal.  No murmur, gallop or friction rub.   Chest: Chest wall tenderness present.  Symmetric chest wall expansion.   Extremities: Normal range of motion.  There is dependent edema.  There is no venous stasis, deformity, effusion or local swelling.    Neurological: Patient is alert and oriented to person, place and time.  Normal strength.  Patient has normal reflexes, normal muscle tone and normal coordination.    Skin:  Warm and dry.  No rash, cyanosis or ulceration.   Abdomen: Abdomen is soft, flat " and non-distended.  There are no signs of ascites.  Hypoactive bowel sounds.   There is no abdominal tenderness.     Pupils:  Pupils are equal, round, and reactive to light.    Pulses: Distal pulses are intact.      Assessment:  (Sepsis shock  COPD exacerbation  Clinically  UTI  A Fib    ).     Plan:   Transfer to floor   Follow WBC.       Rodolfo Carranza MD  1/31/2017

## 2017-01-31 NOTE — SUBJECTIVE & OBJECTIVE
Interval History: reamins in ICU     Review of Systems   Constitutional: Negative for activity change, fatigue, fever and unexpected weight change.   HENT: Negative for congestion, ear pain, hearing loss, rhinorrhea and sore throat.    Eyes: Negative for pain, redness and visual disturbance.   Respiratory: Negative for cough, shortness of breath and wheezing.    Cardiovascular: Negative for chest pain, palpitations and leg swelling.   Gastrointestinal: Negative for abdominal pain, constipation, diarrhea, nausea and vomiting.   Genitourinary: Negative for decreased urine volume, dysuria, frequency and urgency.   Musculoskeletal: Negative for back pain, joint swelling and neck pain.   Skin: Negative for color change, rash and wound.   Neurological: Negative for dizziness, tremors, weakness, light-headedness and headaches.     Objective:     Vital Signs (Most Recent):  Temp: 97.5 °F (36.4 °C) (01/31/17 0400)  Pulse: 105 (01/31/17 0600)  Resp: (!) 22 (01/31/17 0600)  BP: 100/62 (01/31/17 0600)  SpO2: 96 % (01/31/17 0600) Vital Signs (24h Range):  Temp:  [96.8 °F (36 °C)-99.6 °F (37.6 °C)] 97.5 °F (36.4 °C)  Pulse:  [] 105  Resp:  [2-29] 22  SpO2:  [90 %-99 %] 96 %  BP: ()/(49-73) 100/62     Weight: 72.9 kg (160 lb 11.5 oz)  Body mass index is 25.17 kg/(m^2).    Intake/Output Summary (Last 24 hours) at 01/31/17 0723  Last data filed at 01/31/17 0600   Gross per 24 hour   Intake             1640 ml   Output              300 ml   Net             1340 ml      Physical Exam   Constitutional: He is oriented to person, place, and time. He appears well-developed and well-nourished. No distress.   HENT:   Head: Normocephalic and atraumatic.   Right Ear: External ear normal.   Left Ear: External ear normal.   Eyes: Conjunctivae and EOM are normal. Pupils are equal, round, and reactive to light. Right eye exhibits no discharge. Left eye exhibits no discharge.   Neck: Neck supple. No tracheal deviation present.    Cardiovascular: An irregularly irregular rhythm present. Exam reveals no gallop and no friction rub.    No murmur heard.  90s HR : irregulary irregular   Pulmonary/Chest: Effort normal. No respiratory distress. He has wheezes (diffuse). He has no rales.   Abdominal: Soft. Bowel sounds are normal. He exhibits no distension. There is no tenderness. There is no rebound and no guarding.   Musculoskeletal: He exhibits no edema.   Lymphadenopathy:     He has no cervical adenopathy.   Neurological: He is alert and oriented to person, place, and time. No cranial nerve deficit.   Skin: Skin is warm and dry.   Psychiatric: He has a normal mood and affect. His behavior is normal.   Nursing note and vitals reviewed.      Significant Labs:   CBC:   Recent Labs  Lab 01/30/17  0937 01/31/17  0404   WBC 21.70* 14.97*   HGB 15.2 12.4*   HCT 44.2 36.5*    153     CMP:   Recent Labs  Lab 01/30/17  0937 01/31/17  0404    138   K 4.5 4.1    106   CO2 26 24   * 114*   BUN 28* 28*   CREATININE 1.1 0.9   CALCIUM 9.9 8.9   PROT 7.5 6.1   ALBUMIN 3.4* 2.7*   BILITOT 1.2* 1.9*   ALKPHOS 90 67   AST 26 19   ALT 22 15   ANIONGAP 9 8   EGFRNONAA 59* >60       Significant Imaging: CXR: I have reviewed all pertinent results/findings within the past 24 hours and my personal findings are:    The heart is moderately enlarged.  Calcified atheromatous disease affects the aorta.  Left-sided pacemaker devices in place.  The lungs are clear.  The skeletal structures are intact.      CT   Age-appropriate generalized cerebral volume loss with moderate degree of patchy decreased attenuation supratentorial white matter suggestive for chronic microvascular ischemic change similarly seen on the prior.     No evidence for acute intracranial hemorrhage or definite new abnormal parenchymal attenuation. Clinical correlation and further evaluation as warranted.    Blood culture ; no growth

## 2017-01-31 NOTE — PROGRESS NOTES
To floor via bed with house supervisor. Pt stable, daughter at bedside, SCD's applied. Fall contract reviewed & signed. Vital signs stable. Report given to MADDIE Gu.

## 2017-01-31 NOTE — ASSESSMENT & PLAN NOTE
Was on coumadin as outpatient, INR 3.3 today--HOLD for now  Daily INR. Resume Home dose 5mg Mon, Wed, Fri and 2.5mg Tues, Thurs, Sat and Sun; watch closely on Zmax  On sotalol, will continue  Was not started on dilt gtt due to BP  IV metoprolol PRN for HR > 110  TTE ordered-reviewed; AS mod  [facility] consulted

## 2017-01-31 NOTE — ASSESSMENT & PLAN NOTE
-Will review EEG when able (not available to date)  -History of AMS with prior acute metabolic or infectious illness in this patient  -Check Ammonia level, B12  -Otherwise, Can likely avoid further work up if patient's exam and lab findings continue to improve with current treatment.

## 2017-01-31 NOTE — PLAN OF CARE
transferred in bed per Summer RN and Araceli guidry supv,to room 302 on 3rd floor stable upon leaving ICU CM in progress.

## 2017-01-31 NOTE — PLAN OF CARE
Problem: Patient Care Overview  Goal: Plan of Care Review  Outcome: Ongoing (interventions implemented as appropriate)  Pt transferred from ICU this morning.  Admitted with afib with RVR and bronchitis. Remains afib on telemetry. Heart rate ranges from 80- to 120; IV lopressor prn for HR >110. On coumadin; INR 2.8.  Occasional non productive cough. Lung sounds clear. Oxygen sats mid 90's on room air. Low grade temps. Remains on IV rocephin and zithromax; nebs every 6 hours. WBC 14 today; down from 21. SCDs on bilaterally. Family at bedside. Bed in low, locked position. Call bell within reach. Reviewed plan of care with pt and granddaughter; both state agreement.

## 2017-01-31 NOTE — CONSULTS
Hernesto Sofia is a 89 y.o. year old male that's presents with a chief complaint of SOB and Wheezing for 2 days.pt seen in er and in icu for wheezing and leukocytosis and low BP.Admitted to icu for observation and monitoring Elevated BNP. Consulted to evaluate Respiratory status.    Past Medical History   Diagnosis Date    A-fib     Anticoagulant long-term use     Hyperlipidemia     Hypertension     Pacemaker         History reviewed. No pertinent past surgical history.    Prior to Admission medications    Medication Sig Start Date End Date Taking? Authorizing Provider   atorvastatin (LIPITOR) 40 MG tablet Take 40 mg by mouth once daily.   Yes Historical Provider, MD   hydrochlorothiazide (HYDRODIURIL) 25 MG tablet Take 25 mg by mouth. Take one every four days   Yes Historical Provider, MD   multivitamin (ONE DAILY MULTIVITAMIN) per tablet Take 1 tablet by mouth once daily.   Yes Historical Provider, MD   sotalol (BETAPACE) 80 MG tablet Take 40 mg by mouth 2 (two) times daily.   Yes Historical Provider, MD   warfarin (COUMADIN) 5 MG tablet Take 5 mg by mouth Daily. Take 5 mg Mondays, Wednesdays, Fridays.  Take 2.5mg on Tuesdays, Thursdays, Saturdays, Sundays   Yes Historical Provider, MD   lorazepam (ATIVAN) 1 MG tablet Take 0.5 tablets (0.5 mg total) by mouth every 8 (eight) hours as needed for Anxiety. 1/24/17 2/23/17  Vik Lam MD       Social History     Social History    Marital status: Single     Spouse name: N/A    Number of children: N/A    Years of education: N/A     Occupational History    Not on file.     Social History Main Topics    Smoking status: Never Smoker    Smokeless tobacco: Not on file    Alcohol use No    Drug use: No    Sexual activity: No     Other Topics Concern    Not on file     Social History Narrative       History reviewed. No pertinent family history.    Review of patient's allergies indicates:   Allergen Reactions    Pcn [penicillins]     Allergies have been  reviewed.     ROS:  negative N/V, negative Fever, negative Syncope, negative Unilateral weakness, negative Chest pain, negative Rash, negative Sore throat, negative   Lower extremity swelling, negativeAbdominal pain, negativeDysuria, negativePolyuria, negativeEasy Bruising,  negativeWeight Loss, negativeNasal Drainage,otherwise ROS Negative    PE:   Vitals:    01/30/17 1800   BP: (!) 93/58   Pulse: 108   Resp: (!) 2   Temp:      Alert and orientated X 3   HEENT: Head: Normocephalic no trauma                Ears : Normal Pinna No Drainage no Battles sign                Eyes: Vision Unchanged, No conjunctivitis,No drainage                Neck: Supple, No JVD,No Abnormal Carotid Pulsations                Throat: No Erythema, No pus,No Swelling,Mallampati score=    Chest: Course BS and rhonchi mainly on the Left   Cardiac: RRR S1+ S2 with a -S3: +M = 2/6, No R/H/G  Abdomen: Bowel Sounds are Normal.Soft Abdomen. No organomegaly of Liver,Spleen,or Kidneys   CNS: Non focal and intact. Cranial nerves 2, 346,8,9,10 and 12 are normal.Norrmal gait.Normal posture.  Extremities: No Clubbing,No Cyanosis with oxygen,Positive mild edema of lower extremities Bilateral  Skin: No Rash, No Ulcerative sores,and No cellulitis of the IV site.    Lab Results   Component Value Date    WBC 21.70 (H) 01/30/2017    HGB 15.2 01/30/2017    HCT 44.2 01/30/2017     01/30/2017    CHOL 137 12/10/2014    TRIG 110 12/10/2014    HDL 36 (L) 12/10/2014    ALT 22 01/30/2017    AST 26 01/30/2017     01/30/2017    K 4.5 01/30/2017     01/30/2017    CREATININE 1.1 01/30/2017    BUN 28 (H) 01/30/2017    CO2 26 01/30/2017    TSH 1.188 01/30/2017    PSA 0.84 11/18/2014    INR 3.1 (H) 01/30/2017       Assessment:  1. Sepsis   2. Asthmatic Bronchitis   3. Atrial Fibrillation with RVR  4. COPD exacerbation     ICD-10-CM ICD-9-CM   1. Atrial fibrillation with RVR I48.91 427.31   2. Bronchitis J40 490   3. Leukocytosis, unspecified type D72.827  288.60         Plan:  1. Follow Bp support if map falls below 70  2. antibiotics  3. Follow CXR  4. Beta agonist

## 2017-01-31 NOTE — ASSESSMENT & PLAN NOTE
- improved today on treatment for  bronchitis. Mental status improving with treatment of acute infectious issues. Watch for continued clinical improvement.

## 2017-01-31 NOTE — PLAN OF CARE
Pt presents awake and alert. NAD Denies c/o. CM in progress alarms set and on. VS stable. BBS coarse throughout lung celestin. Dr Singh assessed pt and updated on pt current status. PIV to right arm flushed and patent.  Continuing to monitor plan of care discussed with pt pt verbalized understanding. Confused at intervals easily reoriented.

## 2017-01-31 NOTE — ASSESSMENT & PLAN NOTE
WBC 21K on admission>>14>>11  Source is unclear, likely bronchitis  UA is clear, CXR is negative  Blood cx NGTD  Started azithro and rocephin as wheezing and having some SOB now---but is this infection vs cardiac due to RVR??  CXR yesterday with small effusion; d/c ivf  Tmax last night, 100.6  Check flu

## 2017-01-31 NOTE — PROGRESS NOTES
"[facility] Hospital Medicine  Progress Note    Patient Name: Hernesto Sofia  MRN: [id number]  Patient Class: OP- Observation   Admission Date: 1/30/2017  Length of Stay: 0 days  Attending Physician: Roxanne Pandey MD  Primary Care Provider: Hasmukh Pederson MD        Subjective:     Principal Problem:Atrial fibrillation with RVR    HPI:  Patient presented to ER with confusion and shortness of breath. Was brought in by his daughter who is not at the bedside this evening. Patient reports he was having an "out of body experience" and that's why he was brought here. Doesn't give much more detail than this. He denies SOB, chest pains, palpitations, LE edema, orthopnea. Denies dysuria, hematuria but does report frequency and incomplete bladder emptying. He denies abdominal pain, n/v/d/c. He is alert and oriented this evening but is a poor historian.     Hospital Course:    Was found to bed in a[facility] with RVR with rate low 100s. CXR was normal, no consolidation or pulmonary edema. CIS and pulmonary consulted to assist with management. CT head was negative, neurology consulted in ED and planned for MRI brain but he has pacemaker. Was to start on dilt gtt but HR low 100s and BP on the low side so managing with IV metoprolol.      Since metoprolol his HR is around   He is on Zithromax and ceftriaxone      Interval History: reamins in ICU     Review of Systems   Constitutional: Negative for activity change, fatigue, fever and unexpected weight change.   HENT: Negative for congestion, ear pain, hearing loss, rhinorrhea and sore throat.    Eyes: Negative for pain, redness and visual disturbance.   Respiratory: Negative for cough, shortness of breath and wheezing.    Cardiovascular: Negative for chest pain, palpitations and leg swelling.   Gastrointestinal: Negative for abdominal pain, constipation, diarrhea, nausea and vomiting.   Genitourinary: Negative for decreased urine volume, dysuria, " frequency and urgency.   Musculoskeletal: Negative for back pain, joint swelling and neck pain.   Skin: Negative for color change, rash and wound.   Neurological: Negative for dizziness, tremors, weakness, light-headedness and headaches.     Objective:     Vital Signs (Most Recent):  Temp: 97.5 °F (36.4 °C) (01/31/17 0400)  Pulse: 105 (01/31/17 0600)  Resp: (!) 22 (01/31/17 0600)  BP: 100/62 (01/31/17 0600)  SpO2: 96 % (01/31/17 0600) Vital Signs (24h Range):  Temp:  [96.8 °F (36 °C)-99.6 °F (37.6 °C)] 97.5 °F (36.4 °C)  Pulse:  [] 105  Resp:  [2-29] 22  SpO2:  [90 %-99 %] 96 %  BP: ()/(49-73) 100/62     Weight: 72.9 kg (160 lb 11.5 oz)  Body mass index is 25.17 kg/(m^2).    Intake/Output Summary (Last 24 hours) at 01/31/17 0723  Last data filed at 01/31/17 0600   Gross per 24 hour   Intake             1640 ml   Output              300 ml   Net             1340 ml      Physical Exam   Constitutional: He is oriented to person, place, and time. He appears well-developed and well-nourished. No distress.   HENT:   Head: Normocephalic and atraumatic.   Right Ear: External ear normal.   Left Ear: External ear normal.   Eyes: Conjunctivae and EOM are normal. Pupils are equal, round, and reactive to light. Right eye exhibits no discharge. Left eye exhibits no discharge.   Neck: Neck supple. No tracheal deviation present.   Cardiovascular: An irregularly irregular rhythm present. Exam reveals no gallop and no friction rub.    No murmur heard.  90s HR : irregulary irregular   Pulmonary/Chest: Effort normal. No respiratory distress. He has wheezes (diffuse). He has no rales.   Abdominal: Soft. Bowel sounds are normal. He exhibits no distension. There is no tenderness. There is no rebound and no guarding.   Musculoskeletal: He exhibits no edema.   Lymphadenopathy:     He has no cervical adenopathy.   Neurological: He is alert and oriented to person, place, and time. No cranial nerve deficit.   Skin: Skin is warm and  dry.   Psychiatric: He has a normal mood and affect. His behavior is normal.   Nursing note and vitals reviewed.      Significant Labs:   CBC:   Recent Labs  Lab 01/30/17  0937 01/31/17  0404   WBC 21.70* 14.97*   HGB 15.2 12.4*   HCT 44.2 36.5*    153     CMP:   Recent Labs  Lab 01/30/17  0937 01/31/17  0404    138   K 4.5 4.1    106   CO2 26 24   * 114*   BUN 28* 28*   CREATININE 1.1 0.9   CALCIUM 9.9 8.9   PROT 7.5 6.1   ALBUMIN 3.4* 2.7*   BILITOT 1.2* 1.9*   ALKPHOS 90 67   AST 26 19   ALT 22 15   ANIONGAP 9 8   EGFRNONAA 59* >60       Significant Imaging: CXR: I have reviewed all pertinent results/findings within the past 24 hours and my personal findings are:    The heart is moderately enlarged.  Calcified atheromatous disease affects the aorta.  Left-sided pacemaker devices in place.  The lungs are clear.  The skeletal structures are intact.      CT   Age-appropriate generalized cerebral volume loss with moderate degree of patchy decreased attenuation supratentorial white matter suggestive for chronic microvascular ischemic change similarly seen on the prior.     No evidence for acute intracranial hemorrhage or definite new abnormal parenchymal attenuation. Clinical correlation and further evaluation as warranted.    Blood culture ; no growth       Assessment/Plan:      * Atrial fibrillation with RVR  Was on coumadin as outpatient, INR 3.3  Will hold dose tomorrow (received today); daily INR. Home dose 5mg Mon, Wed, Fri and 2.5mg Tues, Thurs, Sat and Sun  On sotalol, will continue  Was not started on dilt gtt due to BP  IV metoprolol PRN for HR > 110  TTE ordered  CIS consulted      Leukocytosis  WBC 21K on admission  Source is unclear  UA is clear, CXR is negative  Blood cx sent  Started azithro and rocephin as wheezing and having some SOB now---but is this infection vs cardiac due to RVR??  BP low normal, was giving IVF but will stop for now  Repeat CXR in the AM  If fevers  "overnight will broaden coverage      HLD (hyperlipidemia)  Continue home atorvastatin      HTN (hypertension)  On HCTZ prior to admission  Hold until BP recovers (likely low due to RVR and ??infection)      Chronic right-sided headaches  This seems to be a chronic issue for him  CT head negative  ESR OK--less likely temporal arterities, not tender on exam  Can't do MRI due to PPM  Neurology was consulted in ED  Tylenol PRN for pain for now  EEG today     Acute confusional state  Was brought to ER for this reason per ER records  Family not at bedside to give history  Patient reports "out of body experience" but can't elaborate further  Now alert and oriented to person, place, time  Neurology consulted  ??metabolic, infection vs cardiac with RVR    EEG today       Cervical radicular pain        VTE Risk Mitigation         Ordered     Medium Risk of VTE  Once      01/30/17 1514     Place sequential compression device  Until discontinued      01/30/17 1514          Billie Singh MD  Department of Hospital Medicine   [facility]  "

## 2017-01-31 NOTE — ASSESSMENT & PLAN NOTE
This seems to be a chronic issue for him  CT head negative  ESR OK--less likely temporal arterities, not tender on exam  Can't do MRI due to PPM  Neurology was consulted in ED  Tylenol PRN for pain for now  EEG today

## 2017-01-31 NOTE — PROGRESS NOTES
Pt transferred from ed to CCU,  Ed nurse transported and bedside report received,  Pt oriented to room ,  Call light in reach ,  nsg assessment completed and orders reviewed,  Vs stable,  Cardiac monitor, applied,  Pt in Afib with controlled rate,  Denies discomfort,  Daughter to bring all belongings home

## 2017-01-31 NOTE — PROGRESS NOTES
[facility]  Neurology  Progress Note    Patient Name: Hernesto Sofia  MRN: [id number]  Admission Date: 1/30/2017  Hospital Length of Stay: 0 days  Code Status: Full Code   Attending Provider: Roxanne Pandey MD  Primary Care Physician: Hasmukh Pederson MD   Principal Problem:Atrial fibrillation with RVR    Past Medical History   Diagnosis Date    A-fib     Anticoagulant long-term use     Hyperlipidemia     Hypertension     Pacemaker        History reviewed. No pertinent past surgical history.    Review of patient's allergies indicates:   Allergen Reactions    Pcn [penicillins]          No current facility-administered medications on file prior to encounter.      Current Outpatient Prescriptions on File Prior to Encounter   Medication Sig    atorvastatin (LIPITOR) 40 MG tablet Take 40 mg by mouth once daily.    hydrochlorothiazide (HYDRODIURIL) 25 MG tablet Take 25 mg by mouth. Take one every four days    sotalol (BETAPACE) 80 MG tablet Take 40 mg by mouth 2 (two) times daily.    warfarin (COUMADIN) 5 MG tablet Take 5 mg by mouth Daily. Take 5 mg Mondays, Wednesdays, Fridays.  Take 2.5mg on Tuesdays, Thursdays, Saturdays, Sundays    lorazepam (ATIVAN) 1 MG tablet Take 0.5 tablets (0.5 mg total) by mouth every 8 (eight) hours as needed for Anxiety.     Family History     None        Social History Main Topics    Smoking status: Never Smoker    Smokeless tobacco: Not on file    Alcohol use No    Drug use: No    Sexual activity: No   I have reviewed all of this patient's past medical and surgical histories as well as family and social histories and active allergies and medications as documented in the electronic medical record.    Review of Systems   Constitutional: Negative for fever.   HENT: Negative for drooling.    Eyes: Negative for photophobia.   Respiratory: Positive for cough.    Cardiovascular: Negative for chest pain.   Gastrointestinal: Negative for blood in stool.    Genitourinary: Negative for hematuria.   Musculoskeletal: Negative for gait problem.   Neurological: Negative for tremors.   Psychiatric/Behavioral: Positive for confusion.   Memory loss/ AMS  Objective:     Vital Signs (Most Recent):  Temp: 99.6 °F (37.6 °C) (01/30/17 2000)  Pulse: (!) 111 (01/30/17 2000)  Resp: (!) 28 (01/30/17 2000)  BP: (!) 99/57 (01/30/17 2000)  SpO2: 97 % (01/30/17 2000) Vital Signs (24h Range):  Temp:  [96.8 °F (36 °C)-99.6 °F (37.6 °C)] 99.6 °F (37.6 °C)  Pulse:  [] 111  Resp:  [2-28] 28  SpO2:  [92 %-99 %] 97 %  BP: ()/(56-73) 99/57     Weight: 72.9 kg (160 lb 11.5 oz)  Body mass index is 25.17 kg/(m^2).    Physical Exam        Gen Appearance, well developed/nourished in no apparent distress  CV: 2+ distal pulses with no edema or swelling  Neuro:  MS: Awake, alert, oriented to place when prompted, person,but not to  time, situation. Sustains attention. Recent recall is forgotten/remote memory intact, Language is full to spontaneous speech/repetition/naming/comprehension. Fund of Knowledge is likely less than expected  CN: Optic discs are flat with normal vasculature, PERRL, Extraoccular movements and visual fields are full. Normal facial sensation and strength, Hearing symmetric, Tongue and Palate are midline and strong. Shoulder Shrug symmetric and strong.  Motor: Normal bulk, tone, no abnormal movements. 5/5 strength bilateral upper/lower extremities with 1+ reflexes and no clonus  Sensory: symmetric to temp, and vibration Romberg not done  Cerebellar: Finger-nose,Heal-shin, Rapid alternating movements intact  Gait: Not done. Patient usually ambulates with a walker      Significant Labs: All pertinent lab results from the past 24 hours have been reviewed.  Troponin unremarkable, ESR normal for age, WBC 21, CMP reviewed    Significant Imaging: I have reviewed and interpreted all pertinent imaging results/findings within the past 24 hours. CT head reports and images are  reviewed and show no acute changes    Assessment and Plan:     * Atrial fibrillation with RVR  On anticoagulation with coumadin for CVA prevention. No signs of CVA on exam. Can't have MRI due to pacemaker in situ    Leukocytosis  Being treated for bronchitis. Mental status improving with treatment of acute infectious issues. Watch for continued clinical improvement.     Acute confusional state  -Improved with treatment of bronchitis per pulmonology  -Check EEG  -History of AMS with prior acute metabolic or infectious illness in this patient  -Can likely avoid further work up if patient's exam and lab findings continue to improve with current treatment.       VTE Risk Mitigation         Ordered     Medium Risk of VTE  Once      01/30/17 1514     Place sequential compression device  Until discontinued      01/30/17 1514          Garrison Arizmendi MD  Neurology  [facility]

## 2017-01-31 NOTE — PLAN OF CARE
Problem: Patient Care Overview  Goal: Plan of Care Review  Outcome: Ongoing (interventions implemented as appropriate)  Patient admitted to CCU2 1/30 with Afib RVR and suspected bronchitis r/o sepsis. Patient with history of dementia at home; currently awake/oriented to self and time only. Vitals stable per flow sheet, on room air at this time; afebrile this shift. Pulmonology and Neurology following with EEG planned 1/31. Instructed patient/family on POC with verbalized understanding.

## 2017-01-31 NOTE — SUBJECTIVE & OBJECTIVE
Past Medical History   Diagnosis Date    A-fib     Anticoagulant long-term use     Hyperlipidemia     Hypertension     Pacemaker        History reviewed. No pertinent past surgical history.    Review of patient's allergies indicates:   Allergen Reactions    Pcn [penicillins]          No current facility-administered medications on file prior to encounter.      Current Outpatient Prescriptions on File Prior to Encounter   Medication Sig    atorvastatin (LIPITOR) 40 MG tablet Take 40 mg by mouth once daily.    hydrochlorothiazide (HYDRODIURIL) 25 MG tablet Take 25 mg by mouth. Take one every four days    sotalol (BETAPACE) 80 MG tablet Take 40 mg by mouth 2 (two) times daily.    warfarin (COUMADIN) 5 MG tablet Take 5 mg by mouth Daily. Take 5 mg Mondays, Wednesdays, Fridays.  Take 2.5mg on Tuesdays, Thursdays, Saturdays, Sundays    lorazepam (ATIVAN) 1 MG tablet Take 0.5 tablets (0.5 mg total) by mouth every 8 (eight) hours as needed for Anxiety.     Family History     None        Social History Main Topics    Smoking status: Never Smoker    Smokeless tobacco: Not on file    Alcohol use No    Drug use: No    Sexual activity: No   I have reviewed all of this patient's past medical and surgical histories as well as family and social histories and active allergies and medications as documented in the electronic medical record.    Review of Systems   Constitutional: Negative for fever.   HENT: Negative for drooling.    Eyes: Negative for photophobia.   Respiratory: Positive for cough.    Cardiovascular: Negative for chest pain.   Gastrointestinal: Negative for blood in stool.   Genitourinary: Negative for hematuria.   Musculoskeletal: Negative for gait problem.   Neurological: Negative for tremors.   Psychiatric/Behavioral: Positive for confusion.   Memory loss/ AMS  Objective:     Vital Signs (Most Recent):  Temp: 99.6 °F (37.6 °C) (01/30/17 2000)  Pulse: (!) 111 (01/30/17 2000)  Resp: (!) 28 (01/30/17  2000)  BP: (!) 99/57 (01/30/17 2000)  SpO2: 97 % (01/30/17 2000) Vital Signs (24h Range):  Temp:  [96.8 °F (36 °C)-99.6 °F (37.6 °C)] 99.6 °F (37.6 °C)  Pulse:  [] 111  Resp:  [2-28] 28  SpO2:  [92 %-99 %] 97 %  BP: ()/(56-73) 99/57     Weight: 72.9 kg (160 lb 11.5 oz)  Body mass index is 25.17 kg/(m^2).    Physical Exam        Gen Appearance, well developed/nourished in no apparent distress  CV: 2+ distal pulses with no edema or swelling  Neuro:  MS: Awake, alert, oriented to place when prompted, person,but not to  time, situation. Sustains attention. Recent recall is forgotten/remote memory intact, Language is full to spontaneous speech/repetition/naming/comprehension. Fund of Knowledge is likely less than expected  CN: Optic discs are flat with normal vasculature, PERRL, Extraoccular movements and visual fields are full. Normal facial sensation and strength, Hearing symmetric, Tongue and Palate are midline and strong. Shoulder Shrug symmetric and strong.  Motor: Normal bulk, tone, no abnormal movements. 5/5 strength bilateral upper/lower extremities with 1+ reflexes and no clonus  Sensory: symmetric to temp, and vibration Romberg not done  Cerebellar: Finger-nose,Heal-shin, Rapid alternating movements intact  Gait: Not done. Patient usually ambulates with a walker      Significant Labs: All pertinent lab results from the past 24 hours have been reviewed.  Troponin unremarkable, ESR normal for age, WBC 21, CMP reviewed    Significant Imaging: I have reviewed and interpreted all pertinent imaging results/findings within the past 24 hours. CT head reports and images are reviewed and show no acute changes

## 2017-01-31 NOTE — ASSESSMENT & PLAN NOTE
-Patient reporting 3 weeks of pain radiating to and from the right neck with right sided headaches. Does not seem related to more acute issues/ seems like Neuralgia pain. Does not seem like primary headache disorder. No history of trauma.   -C spine xray showed degenerative changes  -Check CTA head and neck to rule out vascular cause. Will also look further at structures of C spine.   -Consider prevention agent like low dose gabapentin if neuralgia/radicular pain continues and if mental status improves. .

## 2017-02-01 PROBLEM — I35.0 AORTIC STENOSIS: Status: ACTIVE | Noted: 2017-02-01

## 2017-02-01 LAB
ANION GAP SERPL CALC-SCNC: 7 MMOL/L
BASOPHILS # BLD AUTO: 0.04 K/UL
BASOPHILS NFR BLD: 0.3 %
BUN SERPL-MCNC: 25 MG/DL
CALCIUM SERPL-MCNC: 8.8 MG/DL
CHLORIDE SERPL-SCNC: 105 MMOL/L
CO2 SERPL-SCNC: 23 MMOL/L
CREAT SERPL-MCNC: 0.9 MG/DL
DIFFERENTIAL METHOD: ABNORMAL
EOSINOPHIL # BLD AUTO: 0.2 K/UL
EOSINOPHIL NFR BLD: 1.4 %
ERYTHROCYTE [DISTWIDTH] IN BLOOD BY AUTOMATED COUNT: 13.4 %
EST. GFR  (AFRICAN AMERICAN): >60 ML/MIN/1.73 M^2
EST. GFR  (NON AFRICAN AMERICAN): >60 ML/MIN/1.73 M^2
FLUAV AG SPEC QL IA: NEGATIVE
FLUBV AG SPEC QL IA: NEGATIVE
GLUCOSE SERPL-MCNC: 106 MG/DL
HCT VFR BLD AUTO: 36.4 %
HGB BLD-MCNC: 12.1 G/DL
INR PPP: 3.3
LACTATE SERPL-SCNC: 1.2 MMOL/L
LYMPHOCYTES # BLD AUTO: 1.7 K/UL
LYMPHOCYTES NFR BLD: 14.7 %
MCH RBC QN AUTO: 32.2 PG
MCHC RBC AUTO-ENTMCNC: 33.2 %
MCV RBC AUTO: 97 FL
MONOCYTES # BLD AUTO: 1.2 K/UL
MONOCYTES NFR BLD: 10 %
NEUTROPHILS # BLD AUTO: 8.5 K/UL
NEUTROPHILS NFR BLD: 73.6 %
PLATELET # BLD AUTO: 153 K/UL
PMV BLD AUTO: 10.9 FL
POTASSIUM SERPL-SCNC: 4.4 MMOL/L
PROTHROMBIN TIME: 32.4 SEC
RBC # BLD AUTO: 3.76 M/UL
SODIUM SERPL-SCNC: 135 MMOL/L
SPECIMEN SOURCE: NORMAL
TROPONIN I SERPL DL<=0.01 NG/ML-MCNC: 0.01 NG/ML
TSH SERPL DL<=0.005 MIU/L-ACNC: 3.89 UIU/ML
VIT B12 SERPL-MCNC: 553 PG/ML
WBC # BLD AUTO: 11.61 K/UL

## 2017-02-01 PROCEDURE — 94761 N-INVAS EAR/PLS OXIMETRY MLT: CPT

## 2017-02-01 PROCEDURE — 99233 SBSQ HOSP IP/OBS HIGH 50: CPT | Mod: ,,, | Performed by: FAMILY MEDICINE

## 2017-02-01 PROCEDURE — 84484 ASSAY OF TROPONIN QUANT: CPT

## 2017-02-01 PROCEDURE — 25000242 PHARM REV CODE 250 ALT 637 W/ HCPCS: Performed by: INTERNAL MEDICINE

## 2017-02-01 PROCEDURE — 36415 COLL VENOUS BLD VENIPUNCTURE: CPT

## 2017-02-01 PROCEDURE — 27000221 HC OXYGEN, UP TO 24 HOURS

## 2017-02-01 PROCEDURE — 97162 PT EVAL MOD COMPLEX 30 MIN: CPT

## 2017-02-01 PROCEDURE — 63600175 PHARM REV CODE 636 W HCPCS: Performed by: FAMILY MEDICINE

## 2017-02-01 PROCEDURE — 97116 GAIT TRAINING THERAPY: CPT

## 2017-02-01 PROCEDURE — 25000003 PHARM REV CODE 250: Performed by: SURGERY

## 2017-02-01 PROCEDURE — 25000003 PHARM REV CODE 250: Performed by: NURSE PRACTITIONER

## 2017-02-01 PROCEDURE — 63600175 PHARM REV CODE 636 W HCPCS: Performed by: INTERNAL MEDICINE

## 2017-02-01 PROCEDURE — 85025 COMPLETE CBC W/AUTO DIFF WBC: CPT

## 2017-02-01 PROCEDURE — 83605 ASSAY OF LACTIC ACID: CPT

## 2017-02-01 PROCEDURE — 25000003 PHARM REV CODE 250: Performed by: INTERNAL MEDICINE

## 2017-02-01 PROCEDURE — 80048 BASIC METABOLIC PNL TOTAL CA: CPT

## 2017-02-01 PROCEDURE — 11000001 HC ACUTE MED/SURG PRIVATE ROOM

## 2017-02-01 PROCEDURE — G8988 SELF CARE GOAL STATUS: HCPCS | Mod: CK

## 2017-02-01 PROCEDURE — 87400 INFLUENZA A/B EACH AG IA: CPT

## 2017-02-01 PROCEDURE — 94640 AIRWAY INHALATION TREATMENT: CPT

## 2017-02-01 PROCEDURE — 63600175 PHARM REV CODE 636 W HCPCS: Performed by: SURGERY

## 2017-02-01 PROCEDURE — 84443 ASSAY THYROID STIM HORMONE: CPT

## 2017-02-01 PROCEDURE — 97165 OT EVAL LOW COMPLEX 30 MIN: CPT

## 2017-02-01 PROCEDURE — G8987 SELF CARE CURRENT STATUS: HCPCS | Mod: CL

## 2017-02-01 PROCEDURE — 27000339 *HC DAILY SUPPLY KIT

## 2017-02-01 PROCEDURE — 25000003 PHARM REV CODE 250: Performed by: FAMILY MEDICINE

## 2017-02-01 PROCEDURE — 85610 PROTHROMBIN TIME: CPT

## 2017-02-01 RX ORDER — HYDROCHLOROTHIAZIDE 25 MG/1
25 TABLET ORAL DAILY
Status: DISCONTINUED | OUTPATIENT
Start: 2017-02-01 | End: 2017-02-05 | Stop reason: HOSPADM

## 2017-02-01 RX ORDER — MEROPENEM AND SODIUM CHLORIDE 1 G/50ML
1 INJECTION, SOLUTION INTRAVENOUS
Status: DISCONTINUED | OUTPATIENT
Start: 2017-02-01 | End: 2017-02-01 | Stop reason: SDUPTHER

## 2017-02-01 RX ORDER — SODIUM CHLORIDE 9 MG/ML
INJECTION, SOLUTION INTRAVENOUS ONCE
Status: COMPLETED | OUTPATIENT
Start: 2017-02-01 | End: 2017-02-01

## 2017-02-01 RX ORDER — WARFARIN 1 MG/1
1 TABLET ORAL DAILY
Status: DISCONTINUED | OUTPATIENT
Start: 2017-02-02 | End: 2017-02-05 | Stop reason: HOSPADM

## 2017-02-01 RX ADMIN — MEROPENEM: 1 INJECTION, POWDER, FOR SOLUTION INTRAVENOUS at 06:02

## 2017-02-01 RX ADMIN — SODIUM CHLORIDE 1000 ML: 0.9 INJECTION, SOLUTION INTRAVENOUS at 04:02

## 2017-02-01 RX ADMIN — SOTALOL HYDROCHLORIDE 40 MG: 80 TABLET ORAL at 09:02

## 2017-02-01 RX ADMIN — IPRATROPIUM BROMIDE AND ALBUTEROL SULFATE 3 ML: .5; 3 SOLUTION RESPIRATORY (INHALATION) at 05:02

## 2017-02-01 RX ADMIN — PANTOPRAZOLE SODIUM 40 MG: 40 TABLET, DELAYED RELEASE ORAL at 09:02

## 2017-02-01 RX ADMIN — CEFTRIAXONE 1 G: 1 INJECTION, SOLUTION INTRAVENOUS at 02:02

## 2017-02-01 RX ADMIN — AZITHROMYCIN MONOHYDRATE 500 MG: 500 INJECTION, POWDER, LYOPHILIZED, FOR SOLUTION INTRAVENOUS at 03:02

## 2017-02-01 RX ADMIN — ATORVASTATIN CALCIUM 40 MG: 40 TABLET, FILM COATED ORAL at 09:02

## 2017-02-01 RX ADMIN — ACETAMINOPHEN 650 MG: 325 TABLET, FILM COATED ORAL at 03:02

## 2017-02-01 RX ADMIN — IPRATROPIUM BROMIDE AND ALBUTEROL SULFATE 3 ML: .5; 3 SOLUTION RESPIRATORY (INHALATION) at 12:02

## 2017-02-01 RX ADMIN — VANCOMYCIN HYDROCHLORIDE 1000 MG: 1 INJECTION, POWDER, LYOPHILIZED, FOR SOLUTION INTRAVENOUS at 04:02

## 2017-02-01 RX ADMIN — IPRATROPIUM BROMIDE AND ALBUTEROL SULFATE 3 ML: .5; 3 SOLUTION RESPIRATORY (INHALATION) at 07:02

## 2017-02-01 RX ADMIN — HYDROCHLOROTHIAZIDE 25 MG: 25 TABLET ORAL at 02:02

## 2017-02-01 RX ADMIN — SODIUM CHLORIDE: 0.9 INJECTION, SOLUTION INTRAVENOUS at 04:02

## 2017-02-01 NOTE — NURSING
Report received and assessment completed. Stable condition. Congested non productive cough. Right A/C edematous and red. HOB elevated with resp. tx in progress. Plan of care explained and verbalizes understanding. Call stoney Toure RN

## 2017-02-01 NOTE — PROGRESS NOTES
"Hernesto Sofia is a 89 y.o. male patient.looks great    Active Hospital Problems    Diagnosis  POA    *Atrial fibrillation with RVR [I48.91]  Yes    Aortic stenosis [I35.0]  Yes    Leukocytosis [D72.829]  Yes    HLD (hyperlipidemia) [E78.5]  Yes    HTN (hypertension) [I10]  Yes    Chronic right-sided headaches [R51]  Yes    Acute confusional state [F05]  Yes    Cervical radicular pain [M54.12]  Yes      Resolved Hospital Problems    Diagnosis Date Resolved POA   No resolved problems to display.     Temp: 98.8 °F (37.1 °C) (02/01/17 0400)  Pulse: 66 (02/01/17 0725)  Resp: 18 (02/01/17 0725)  BP: 111/66 (02/01/17 0400)  SpO2: (!) 94 % (02/01/17 0725)  Weight: 72.9 kg (160 lb 11.5 oz) (01/30/17 1445)  Height: 5' 7" (170.2 cm) (01/30/17 1445)    Subjective:  Symptoms:  Worsening.  He reports shortness of breath, cough, chest pain, weakness and anxiety.    Diet:  Adequate intake.    Activity level: Impaired due to weakness.    Pain:  He complains of pain that is mild.      Objective:  General Appearance:  Ill-appearing.    Vital signs: (most recent): Blood pressure 111/66, pulse 66, temperature 98.8 °F (37.1 °C), temperature source Oral, resp. rate 18, height 5' 7" (1.702 m), weight 72.9 kg (160 lb 11.5 oz), SpO2 (!) 94 %.  Fever.    Output: Producing urine and producing stool.    Lungs:  Tachypnea.  He is in respiratory distress.  There are wheezes, rhonchi and decreased breath sounds.    Heart: Tachycardia.  Regular rhythm.  S1 normal and S2 normal.  No murmur, gallop or friction rub.   Chest: Chest wall tenderness present.  Symmetric chest wall expansion.   Extremities: Normal range of motion.  There is dependent edema.  There is no venous stasis, deformity, effusion or local swelling.    Neurological: Patient is alert and oriented to person, place and time.  Normal strength.  Patient has normal reflexes, normal muscle tone and normal coordination.    Skin:  Warm and dry.  No rash, cyanosis or ulceration. "   Abdomen: Abdomen is soft, flat and non-distended.  There are no signs of ascites.  Hypoactive bowel sounds.   There is no abdominal tenderness.     Pupils:  Pupils are equal, round, and reactive to light.    Pulses: Distal pulses are intact.      Assessment:  (SIRS  COPD exacerbation  Clinically  UTI  A Fib    ).     Plan:   Doing much hoda   WBC 11ooo.       Rodolfo Carranza MD  2/1/2017

## 2017-02-01 NOTE — ASSESSMENT & PLAN NOTE
This seems to be a chronic issue for him  CT head negative  ESR OK--less likely temporal arterities, not tender on exam  Can't do MRI due to PPM  Neurology was consulted in ED-Trigeminal neuralgia vs cervical radicular pain in differential; ? Start low dose gabapentin for proph  Tylenol PRN for pain for now  EEG pending

## 2017-02-01 NOTE — PT/OT/SLP EVAL
"Occupational Therapy  Evaluation    Hernesto Sofia   MRN: [id number]   Admitting Diagnosis: Atrial fibrillation with RVR    OT Date of Treatment: 17   OT Start Time: 1345  OT Stop Time: 1410  OT Total Time (min): 25 min    Billable Minutes:  Evaluation 25 min    Diagnosis: Atrial fibrillation with RVR     Past Medical History   Diagnosis Date    A-fib     Anticoagulant long-term use     Hyperlipidemia     Hypertension     Pacemaker       History reviewed. No pertinent past surgical history.    Date referred to OT: 2017    General Precautions: Standard, fall    Do you have any cultural, spiritual, Catholic conflicts, given your current situation?:  (none verbalized)     Patient History:  Living Environment  Lives With: alone (with sitters)    Prior level of function:   Bed Mobility/Transfers:  (supervision with rolling walker)  Grooming: independent  Bathing: needs assist  Upper Body Dressing: independent  Lower Body Dressing: independent  Toileting: independent  Patient and sitter reported patient was completing basic ADL's himself with supervision from sitters except assistance with bathing.     Dominant hand: right    Subjective:  Communicated with nursing, patient, and sitter prior to session.    Chief Complaint: weakness  Patient/Family stated goals: "To get back home" per pt    Pain Ratin/10  Objective:      Cognitive Exam:  Oriented to: Person, Place, Time and Situation  Follows Commands/attention: Follows one-step commands    Physical Exam:  Sensation:   Intact bilateral UE    Upper Extremity Range of Motion:  Right Upper Extremity: WFL  Left Upper Extremity: WFL    Upper Extremity Strength:  Right Upper Extremity: 3+/5  Left Upper Extremity: 3+/5   Strength: fair    Fine motor coordination:   To be assessed with grooming    Functional Mobility:  Bed Mobility:  Patient sitting up in bed side chair on OT arrival    Activities of Daily Living:  UE Dressing Level of Assistance: Minimum " "assistance (simulated with gown)  LE Dressing Level of Assistance: Moderate assistance  Toileting Level of Assistance: Stand by assistance (verbal cues with urinal)    AM-PAC 6 CLICK ADL  How much help from another person does this patient currently need?  1 = Unable, Total/Dependent Assistance  2 = A lot, Maximum/Moderate Assistance  3 = A little, Minimum/Contact Guard/Supervision  4 = None, Modified Lucas/Independent    Putting on and taking off regular lower body clothing? : 2  Bathing (including washing, rinsing, drying)?: 1 (not performed today)  Toileting, which includes using toilet, bedpan, or urinal? : 3  Putting on and taking off regular upper body clothing?: 3  Taking care of personal grooming such as brushing teeth?: 1 (not performed today)  Eating meals?: 1 (not performed with OT today)  Total Score: 11    AM-PAC Raw Score CMS "G-Code Modifier Level of Impairment Assistance   6 % Total / Unable   7 - 9 CM 80 - 100% Maximal Assist   10 - 14 CL 60 - 80% Moderate Assist   15 - 19 CK 40 - 60% Moderate Assist   20 - 22 CJ 20 - 40% Minimal Assist   23 CI 1-20% SBA / CGA   24 CH 0% Independent/ Mod I       Patient left up in reclining chair with all lines intact, call button in reach and sitter present    Assessment:  Hernesto Sofia is a 89 y.o. male with a medical diagnosis of Atrial fibrillation with RVR and presents with weakness and decreased ADL skills and functional mobility.  Patient with good motivation and tolerance for activity with therapy.    Rehab identified problem list/impairments: Rehab identified problem list/impairments: weakness, impaired self care skills, impaired functional mobilty    Rehab potential is good.    Activity tolerance: Good    Discharge recommendations: Discharge Facility/Level Of Care Needs:  (if home with sitters, home health OT)     Equipment recommendations:  (pt already has a walker, BSC, and shower chair)     GOALS:   Occupational Therapy Goals        " Problem: Occupational Therapy Goal    Goal Priority Disciplines Outcome Interventions   Occupational Therapy Goal     OT, PT/OT     Description:  Patient will increase functional independence with ADLs by performing:    UE Dressing with Set-up Assistance.  LE Dressing with Minimal Assistance.  Grooming to be assessed   Toileting from bedside commode with Supervision for hygiene and clothing management.                 PLAN:  Patient to be seen  (3 to 5 x week ) to address the above listed problems via self-care/home management, therapeutic activities, therapeutic exercises  Plan of Care expires: upon discharge  Plan of Care reviewed with: patient    Factors that may affect patient's status:  [x] Patient's age [] Time since onset of injury/illness/exacerbation  []Prior Level of function       [x] Current level of function [] Status of current condition []Patient's cognitive status and safety concerns      [] Patient's level of motivation    [] Patient's home situation (environment and family support)  [] Objective examination findings [] Goals and goal agreement with the patient        [] Rehab potential (prognosis) and probable outcome    [] Expected progression of patient    Criteria:     Medical Background and History:    Brief History - 83208  Occupational Performance and Deficits:  identifies 1 - 3 performance deficits - 33706  Clinical Decision Making (Complexity):  limited # of tx options - 17864     Evaluation Code (Complexity):  Low - 14782 -       After brief  review of medical and/or therapy records relating to the presenting problem and on examination of body system using standardized tests and measures patient presents with 1 - 3 elements from any of the following: body structures and functions, activity limitations, and/or participation restrictions.  Leading to a clinical consideration of a  limited number of treatment options that may lead to minimal to moderate modification of tasks or assistance  necessary to enable completion of evaluation component.      LUIS ALBERTO Ty  02/01/2017

## 2017-02-01 NOTE — CONSULTS
Food & Nutrition  Education    Diet Education:Coumadin    Nutrition Education provided with handouts: Coumadin and Vitamin K intake. All questions and concerns answered.    Dietitian's contact information provided.     Re-consult as needed.     Thanks!

## 2017-02-01 NOTE — PLAN OF CARE
Problem: Mobility, Physical Impaired (Adult)  Goal: Enhanced Mobility Skills  Patient will demonstrate the desired outcomes by discharge/transition of care.  Outcome: Ongoing (interventions implemented as appropriate)  Quiet evening. IV patent and tolerating we.. Rales left lower lobe. HOB elevated. Non productive cough. Plan of care explained and verbalizes understanding. LORI Toure RN

## 2017-02-01 NOTE — SUBJECTIVE & OBJECTIVE
Interval History: now on floor    Review of Systems   Constitutional: Negative for activity change, fatigue, fever and unexpected weight change.   HENT: Negative for congestion, ear pain, hearing loss, rhinorrhea and sore throat.    Eyes: Negative for visual disturbance.   Respiratory: Negative for cough, shortness of breath and wheezing.    Cardiovascular: Negative for chest pain, palpitations and leg swelling.   Gastrointestinal: Negative for abdominal pain, constipation, diarrhea, nausea and vomiting.   Genitourinary: Negative for decreased urine volume, dysuria, frequency and urgency.   Musculoskeletal: Negative for back pain, joint swelling and neck pain.   Skin: Negative for color change, rash and wound.   Neurological: Negative for dizziness, tremors, weakness, light-headedness and headaches.     Objective:     Vital Signs (Most Recent):  Temp: 98.8 °F (37.1 °C) (02/01/17 0400)  Pulse: 66 (02/01/17 0725)  Resp: 18 (02/01/17 0725)  BP: 111/66 (02/01/17 0400)  SpO2: (!) 94 % (02/01/17 0725) Vital Signs (24h Range):  Temp:  [98.8 °F (37.1 °C)-100.6 °F (38.1 °C)] 98.8 °F (37.1 °C)  Pulse:  [] 66  Resp:  [16-26] 18  SpO2:  [90 %-97 %] 94 %  BP: ()/(62-77) 111/66     Weight: 72.9 kg (160 lb 11.5 oz)  Body mass index is 25.17 kg/(m^2).    Intake/Output Summary (Last 24 hours) at 02/01/17 0756  Last data filed at 02/01/17 0600   Gross per 24 hour   Intake          1438.75 ml   Output              450 ml   Net           988.75 ml      Physical Exam   Constitutional: He is oriented to person, place, and time. He appears well-developed and well-nourished. No distress.   HENT:   Head: Normocephalic and atraumatic.   Right Ear: External ear normal.   Left Ear: External ear normal.   Eyes: Conjunctivae and EOM are normal. Pupils are equal, round, and reactive to light. Right eye exhibits no discharge. Left eye exhibits no discharge.   Neck: Neck supple. No tracheal deviation present.   Cardiovascular: An  irregularly irregular rhythm present. Exam reveals no gallop and no friction rub.    No murmur heard.  90s HR : irregulary irregular   Pulmonary/Chest: Effort normal. No respiratory distress. He has wheezes (diffuse). He has no rales.   Abdominal: Soft. Bowel sounds are normal. He exhibits no distension. There is no tenderness. There is no rebound and no guarding.   Musculoskeletal: He exhibits no edema.   Lymphadenopathy:     He has no cervical adenopathy.   Neurological: He is alert and oriented to person, place, and time. No cranial nerve deficit.   Skin: Skin is warm and dry.   Psychiatric: He has a normal mood and affect. His behavior is normal.   Nursing note and vitals reviewed.      Significant Labs:   CBC:     Recent Labs  Lab 01/30/17 0937 01/31/17  0404 02/01/17  0531   WBC 21.70* 14.97* 11.61   HGB 15.2 12.4* 12.1*   HCT 44.2 36.5* 36.4*    153 153     CMP:     Recent Labs  Lab 01/30/17 0937 01/31/17  0404 02/01/17  0531    138 135*   K 4.5 4.1 4.4    106 105   CO2 26 24 23   * 114* 106   BUN 28* 28* 25*   CREATININE 1.1 0.9 0.9   CALCIUM 9.9 8.9 8.8   PROT 7.5 6.1  --    ALBUMIN 3.4* 2.7*  --    BILITOT 1.2* 1.9*  --    ALKPHOS 90 67  --    AST 26 19  --    ALT 22 15  --    ANIONGAP 9 8 7*   EGFRNONAA 59* >60 >60       Significant Imaging: CXR: I have reviewed all pertinent results/findings within the past 24 hours and my personal findings are:    The heart is moderately enlarged.  Calcified atheromatous disease affects the aorta.  Left-sided pacemaker devices in place.  The lungs are clear.  The skeletal structures are intact.      CT   Age-appropriate generalized cerebral volume loss with moderate degree of patchy decreased attenuation supratentorial white matter suggestive for chronic microvascular ischemic change similarly seen on the prior.     No evidence for acute intracranial hemorrhage or definite new abnormal parenchymal attenuation. Clinical correlation and further  evaluation as warranted.    Blood culture ; no growth

## 2017-02-01 NOTE — ASSESSMENT & PLAN NOTE
Trigeminal neuralgia vs cervical radicular pain in differential  ? Start low dose gabapentin for proph  CTA head and neck today  Dr. Arizmendi following

## 2017-02-01 NOTE — PLAN OF CARE
Problem: Patient Care Overview  Goal: Plan of Care Review  Outcome: Ongoing (interventions implemented as appropriate)  Educate patient/family/cargiver about administration of aerosolized medications via the inhalation route to aid in bronchial hygiene & deliver medications.  BBS clear but diminished. Pt on room air.

## 2017-02-01 NOTE — PHYSICIAN QUERY
PT Name: Hernesto Sofia  MR #: [id number]    Physician Query Form - Consultant Diagnosis Clarification     Reviewer Austin Henry RN, CDI  Ext [phone]  This form is a permanent document in the medical record.     Query Date: February 1, 2017    Dear Provider,   By submitting this query, we are merely seeking further clarification of documentation.  Please utilize your independent clinical judgment when addressing the question(s) below.      The Medical record reflects the following:   Diagnosis Supporting Clinical Information Location in Medical Record     COPD exacerbation  Asthmatic Bronchitis                     COPD exacerbation Clinically   chief complaint of SOB and Wheezing for 2 days    Started azithro and rocephin as wheezing and having some SOB now---but is this infection vs cardiac due to RVR??  BP low normal, was giving IVF but will stop for now    CXR was normal, no consolidation or pulmonary edema        Albuterol Nebulizer   Pulm Consult: 1/30    H & P: 1/30            Progress Note: 1/31      Pulm Progress Note: 1/31    MAR           Do you agree with the Consultants diagnosis of ____COPD exacerbation_________?                 [   ]   Yes               [   ]   Yes, but it resolved prior to my assessment of the patient               [ X  ]   No                [   ]   Clinically insignificant               [   ]   Clinically undetermined               [   ]   Other/Clarification of findings: ____to my knowledge patient has no prior h/o COPD. this is likely acute bronchitis we are treating_______________________________________

## 2017-02-01 NOTE — ASSESSMENT & PLAN NOTE
"Was brought to ER for this reason per ER records  Family not at bedside to give history  Patient reports "out of body experience" but can't elaborate further  Now alert and oriented to person, place, time  Neurology consulted-? Related to infection; now improved  ??metabolic, infection vs cardiac with RVR    EEG pending    "

## 2017-02-01 NOTE — PROGRESS NOTES
[facility]  Cardiology  Progress Note    Patient Name: Hernesto Sofia  MRN: [id number]  Admission Date: 1/30/2017  Hospital Length of Stay: 1 days  Code Status: Full Code   Attending Physician: Roxanne Pandey MD   Primary Care Physician: Hasmukh Pederson MD  Expected Discharge Date:   Principal Problem:Atrial fibrillation with RVR    Subjective:     Chief Complaint:  Decreased LOC, A fib      HPI: 89 year old w/m with HX of chronic A fib, HLD, HTN presents for decreased LOC. He was found by caregiver slumped down in chair and was difficult to arouse. Patient was noted to have a WBC count of 21,000 and was started on ABX therapy. Initially he was noted to be in A fib with RVR with a rate of 110. His rate is now relatively well controlled consistently below 100. He is now awake and alert. He denies chest pain, palpitations, or SOB at this time.     Review of Systems   Constitutional: Negative   Eyes: Negative   Respiratory: Negative   Cardiovascular: Negative   Gastrointestinal: Negative   Genitourinary: Negative   Musculoskeletal: Negative   Skin: Negative .   Neurological: Negative   Objective:     Vital Signs (Most Recent):  Temp: 98.8 °F (37.1 °C) (02/01/17 0400)  Pulse: 66 (02/01/17 0725)  Resp: 18 (02/01/17 0725)  BP: 111/66 (02/01/17 0400)  SpO2: (!) 94 % (02/01/17 0725) Vital Signs (24h Range):  Temp:  [98.8 °F (37.1 °C)-100.6 °F (38.1 °C)] 98.8 °F (37.1 °C)  Pulse:  [] 66  Resp:  [16-26] 18  SpO2:  [90 %-97 %] 94 %  BP: ()/(62-77) 111/66     Weight: 72.9 kg (160 lb 11.5 oz)  Body mass index is 25.17 kg/(m^2).    SpO2: (!) 94 %  O2 Device (Oxygen Therapy): room air      Intake/Output Summary (Last 24 hours) at 02/01/17 0815  Last data filed at 02/01/17 0600   Gross per 24 hour   Intake          1438.75 ml   Output              450 ml   Net           988.75 ml       Lines/Drains/Airways     Peripheral Intravenous Line                 Peripheral IV - Single Lumen 01/31/17  1620 Left Forearm less than 1 day                Physical Exam   General appearance: alert, appears stated age and cooperative  Head: Normocephalic, without obvious abnormality, atraumatic  Eyes: conjunctivae/corneas clear. PERRL  Neck: no carotid bruit, no JVD and supple, symmetrical, trachea midline  Lungs: clear to auscultation bilaterally, normal respiratory effort  Chest Wall: no tenderness  Heart: irregular rhythm controlled rate, S1, S2 normal  Abdomen: soft, non-tender; bowel sounds normal; no masses, no organomegaly  Extremities: Extremities normal, atraumatic, no cyanosis, clubbing, or edema  Pulses: Dorsalis Pedis R: 2+ (normal)/L: 2+ (normal)  Skin: Skin color, texture, turgor normal. No rashes or lesions  Neurologic: Normal mood and affect  Awake alert oriented to person and place    Significant Labs:   BMP:   Glucose (mg/dL)   Date/Time Value Status   02/01/2017 05:31  Final   01/31/2017 04:04  (H) Final   01/30/2017 09:37  (H) Final     Sodium (mmol/L)   Date/Time Value Status   02/01/2017 05:31  (L) Final   01/31/2017 04:04  Final   01/30/2017 09:37  Final     Potassium (mmol/L)   Date/Time Value Status   02/01/2017 05:31 AM 4.4 Final   01/31/2017 04:04 AM 4.1 Final   01/30/2017 09:37 AM 4.5 Final     Chloride (mmol/L)   Date/Time Value Status   02/01/2017 05:31  Final   01/31/2017 04:04  Final   01/30/2017 09:37  Final     CO2 (mmol/L)   Date/Time Value Status   02/01/2017 05:31 AM 23 Final   01/31/2017 04:04 AM 24 Final   01/30/2017 09:37 AM 26 Final     BUN, Bld (mg/dL)   Date/Time Value Status   02/01/2017 05:31 AM 25 (H) Final   01/31/2017 04:04 AM 28 (H) Final   01/30/2017 09:37 AM 28 (H) Final     Creatinine (mg/dL)   Date/Time Value Status   02/01/2017 05:31 AM 0.9 Final   01/31/2017 04:04 AM 0.9 Final   01/30/2017 09:37 AM 1.1 Final     Calcium (mg/dL)   Date/Time Value Status   02/01/2017 05:31 AM 8.8 Final   01/31/2017 04:04 AM 8.9  Final   01/30/2017 09:37 AM 9.9 Final     Magnesium (mg/dL)   Date/Time Value Status   01/30/2017 09:37 AM 2.2 Final   01/17/2017 06:51 PM 2.0 Final   , CMP   Sodium (mmol/L)   Date/Time Value Status   02/01/2017 05:31  (L) Final   01/31/2017 04:04  Final   01/30/2017 09:37  Final     Potassium (mmol/L)   Date/Time Value Status   02/01/2017 05:31 AM 4.4 Final   01/31/2017 04:04 AM 4.1 Final   01/30/2017 09:37 AM 4.5 Final     Chloride (mmol/L)   Date/Time Value Status   02/01/2017 05:31  Final   01/31/2017 04:04  Final   01/30/2017 09:37  Final     CO2 (mmol/L)   Date/Time Value Status   02/01/2017 05:31 AM 23 Final   01/31/2017 04:04 AM 24 Final   01/30/2017 09:37 AM 26 Final     Glucose (mg/dL)   Date/Time Value Status   02/01/2017 05:31  Final   01/31/2017 04:04  (H) Final   01/30/2017 09:37  (H) Final     BUN, Bld (mg/dL)   Date/Time Value Status   02/01/2017 05:31 AM 25 (H) Final   01/31/2017 04:04 AM 28 (H) Final   01/30/2017 09:37 AM 28 (H) Final     Creatinine (mg/dL)   Date/Time Value Status   02/01/2017 05:31 AM 0.9 Final   01/31/2017 04:04 AM 0.9 Final   01/30/2017 09:37 AM 1.1 Final     Calcium (mg/dL)   Date/Time Value Status   02/01/2017 05:31 AM 8.8 Final   01/31/2017 04:04 AM 8.9 Final   01/30/2017 09:37 AM 9.9 Final     Total Protein (g/dL)   Date/Time Value Status   01/31/2017 04:04 AM 6.1 Final   01/30/2017 09:37 AM 7.5 Final   01/23/2017 11:36 PM 6.8 Final     Albumin (g/dL)   Date/Time Value Status   01/31/2017 04:04 AM 2.7 (L) Final   01/30/2017 09:37 AM 3.4 (L) Final   01/23/2017 11:36 PM 3.2 (L) Final     Total Bilirubin (mg/dL)   Date/Time Value Status   01/31/2017 04:04 AM 1.9 (H) Final   01/30/2017 09:37 AM 1.2 (H) Final   01/23/2017 11:36 PM 0.5 Final     Alkaline Phosphatase (U/L)   Date/Time Value Status   01/31/2017 04:04 AM 67 Final   01/30/2017 09:37 AM 90 Final   01/23/2017 11:36 PM 92 Final     AST (U/L)   Date/Time Value Status    01/31/2017 04:04 AM 19 Final   01/30/2017 09:37 AM 26 Final   01/23/2017 11:36 PM 34 Final     ALT (U/L)   Date/Time Value Status   01/31/2017 04:04 AM 15 Final   01/30/2017 09:37 AM 22 Final   01/23/2017 11:36 PM 28 Final     Anion Gap (mmol/L)   Date/Time Value Status   02/01/2017 05:31 AM 7 (L) Final   01/31/2017 04:04 AM 8 Final   01/30/2017 09:37 AM 9 Final     eGFR if African American (mL/min/1.73 m^2)   Date/Time Value Status   02/01/2017 05:31 AM >60 Final   01/31/2017 04:04 AM >60 Final   01/30/2017 09:37 AM >60 Final     eGFR if non African American (mL/min/1.73 m^2)   Date/Time Value Status   02/01/2017 05:31 AM >60 Final   01/31/2017 04:04 AM >60 Final   01/30/2017 09:37 AM 59 (A) Final   , CBC   WBC (K/uL)   Date/Time Value Status   02/01/2017 05:31 AM 11.61 Final   01/31/2017 04:04 AM 14.97 (H) Final   01/30/2017 09:37 AM 21.70 (H) Final     Hemoglobin (g/dL)   Date/Time Value Status   02/01/2017 05:31 AM 12.1 (L) Final   01/31/2017 04:04 AM 12.4 (L) Final   01/30/2017 09:37 AM 15.2 Final     Hematocrit (%)   Date/Time Value Status   02/01/2017 05:31 AM 36.4 (L) Final     Platelets (K/uL)   Date/Time Value Status   02/01/2017 05:31  Final   01/31/2017 04:04  Final   01/30/2017 09:37  Final   , INR   INR (no units)   Date/Time Value Status   02/01/2017 05:31 AM 3.3 (H) Final   01/31/2017 04:04 AM 2.8 (H) Final   01/30/2017 09:37 AM 3.1 (H) Final    and Troponin   Troponin I (ng/mL)   Date/Time Value Status   02/01/2017 03:12 AM 0.008 Final   01/31/2017 09:09 PM 0.021 Final   01/31/2017 06:12 PM 0.015 Final       Significant Imaging: Echocardiogram:   2D echo with color flow doppler:   Results for orders placed or performed during the hospital encounter of 01/30/17   2D echo with color flow doppler   Result Value Ref Range    EF 50 55 - 65    Mitral Valve Regurgitation TRIVIAL     Aortic Valve Stenosis MILD TO MODERATE (A)     Est. PA Systolic Pressure 30.69     Tricuspid Valve  Regurgitation MILD     and X-Ray: CXR: X-Ray Chest 1 View (CXR):   Results for orders placed or performed during the hospital encounter of 01/30/17   X-Ray Chest 1 View    Narrative    Chest, 1 view: The heart is moderately enlarged.  Calcified atheromatous disease affects the aorta.  Left-sided pacemaker devices in place.  The lungs are clear.  The skeletal structures are intact.    Impression     As above.      Electronically signed by: Yany Thornton MD  Date:     01/30/17  Time:    09:59     and X-Ray Chest PA and Lateral (CXR):   Results for orders placed or performed during the hospital encounter of 01/30/17   X-Ray Chest PA And Lateral    Narrative    Chest, 2 views: Small left-sided pleural effusion is suspected.  No definite consolidation is identified.  The heart is moderately enlarged.  Calcified atheromatous disease affects the aorta.  Left-sided pacemaker device in place.  The bones are osteopenic and show age-appropriate degenerative change.    Impression     As above.      Electronically signed by: Yany Thornton MD  Date:     01/31/17  Time:    13:48      Assessment and Plan:     AMS in a frail 89 yo; currently appears stable without c/o; still wheezing some  No CHF on exam   Chronic A fib; rate somewhat elevated because of infectious process; now controlled  S/p PM for SSS  HTN controlled LVEF 50% LVH AS 1.3 Mild MR, PAS 38 3/12, stable per echo yesterday  Normal stress perf 2009   Leukocytosis     PLAN:continue atorvastatin 40, HCTZ 25, sotalol 40 bid  Adjust coumadin, hold for 1 day, then 1 mg; INR qd  Neuro following    Active Diagnoses:    Diagnosis Date Noted POA    PRINCIPAL PROBLEM:  Atrial fibrillation with RVR [I48.91] 01/30/2017 Yes    Aortic stenosis [I35.0] 02/01/2017 Yes    Leukocytosis [D72.829] 01/30/2017 Yes    HLD (hyperlipidemia) [E78.5] 01/30/2017 Yes    HTN (hypertension) [I10] 01/30/2017 Yes    Chronic right-sided headaches [R51] 01/30/2017 Yes    Acute confusional state  [F05] 01/30/2017 Yes    Cervical radicular pain [M54.12] 01/30/2017 Yes      Problems Resolved During this Admission:    Diagnosis Date Noted Date Resolved POA       VTE Risk Mitigation         Ordered     Medium Risk of VTE  Once      01/30/17 1514     Place sequential compression device  Until discontinued      01/30/17 1514          I attest that I have personally seen and examined this patient. I have reviewed and discussed the management in detail as outlined above.

## 2017-02-01 NOTE — NURSING
1030a pt to ct  1115a pt back from ct stable.   No iv due to extravasation of iv contrast  Swelling and redness noted to left arm.   Elevated left arm on 2 pillows and ice pack applied

## 2017-02-02 PROBLEM — J18.9 PNEUMONIA OF LEFT UPPER LOBE DUE TO INFECTIOUS ORGANISM: Status: ACTIVE | Noted: 2017-02-02

## 2017-02-02 LAB
ANION GAP SERPL CALC-SCNC: 11 MMOL/L
BACTERIA #/AREA URNS HPF: NORMAL /HPF
BASOPHILS # BLD AUTO: 0.04 K/UL
BASOPHILS NFR BLD: 0.4 %
BILIRUB UR QL STRIP: NEGATIVE
BUN SERPL-MCNC: 19 MG/DL
CALCIUM SERPL-MCNC: 9.2 MG/DL
CHLORIDE SERPL-SCNC: 101 MMOL/L
CLARITY UR: CLEAR
CO2 SERPL-SCNC: 23 MMOL/L
COLOR UR: YELLOW
CREAT SERPL-MCNC: 0.8 MG/DL
DIFFERENTIAL METHOD: ABNORMAL
EOSINOPHIL # BLD AUTO: 0.3 K/UL
EOSINOPHIL NFR BLD: 2.5 %
ERYTHROCYTE [DISTWIDTH] IN BLOOD BY AUTOMATED COUNT: 12.9 %
EST. GFR  (AFRICAN AMERICAN): >60 ML/MIN/1.73 M^2
EST. GFR  (NON AFRICAN AMERICAN): >60 ML/MIN/1.73 M^2
GLUCOSE SERPL-MCNC: 105 MG/DL
GLUCOSE UR QL STRIP: NEGATIVE
HCT VFR BLD AUTO: 38 %
HGB BLD-MCNC: 13.3 G/DL
HGB UR QL STRIP: ABNORMAL
INR PPP: 2.6
KETONES UR QL STRIP: NEGATIVE
LEUKOCYTE ESTERASE UR QL STRIP: NEGATIVE
LYMPHOCYTES # BLD AUTO: 1.2 K/UL
LYMPHOCYTES NFR BLD: 11.9 %
MCH RBC QN AUTO: 32.7 PG
MCHC RBC AUTO-ENTMCNC: 35 %
MCV RBC AUTO: 93 FL
MICROSCOPIC COMMENT: NORMAL
MONOCYTES # BLD AUTO: 1 K/UL
MONOCYTES NFR BLD: 9.5 %
NEUTROPHILS # BLD AUTO: 7.5 K/UL
NEUTROPHILS NFR BLD: 75.7 %
NITRITE UR QL STRIP: NEGATIVE
PH UR STRIP: 5 [PH] (ref 5–8)
PLATELET # BLD AUTO: 174 K/UL
PMV BLD AUTO: 10.7 FL
POTASSIUM SERPL-SCNC: 3.9 MMOL/L
PROT UR QL STRIP: NEGATIVE
PROTHROMBIN TIME: 25.5 SEC
RBC # BLD AUTO: 4.07 M/UL
RBC #/AREA URNS HPF: 0 /HPF (ref 0–4)
SODIUM SERPL-SCNC: 135 MMOL/L
SP GR UR STRIP: 1.02 (ref 1–1.03)
URN SPEC COLLECT METH UR: ABNORMAL
UROBILINOGEN UR STRIP-ACNC: 1 EU/DL
WBC # BLD AUTO: 9.97 K/UL
WBC #/AREA URNS HPF: 0 /HPF (ref 0–5)

## 2017-02-02 PROCEDURE — 36415 COLL VENOUS BLD VENIPUNCTURE: CPT

## 2017-02-02 PROCEDURE — 27000221 HC OXYGEN, UP TO 24 HOURS

## 2017-02-02 PROCEDURE — 25000003 PHARM REV CODE 250: Performed by: SURGERY

## 2017-02-02 PROCEDURE — 85610 PROTHROMBIN TIME: CPT

## 2017-02-02 PROCEDURE — 25000003 PHARM REV CODE 250: Performed by: FAMILY MEDICINE

## 2017-02-02 PROCEDURE — 80048 BASIC METABOLIC PNL TOTAL CA: CPT

## 2017-02-02 PROCEDURE — 25000242 PHARM REV CODE 250 ALT 637 W/ HCPCS: Performed by: INTERNAL MEDICINE

## 2017-02-02 PROCEDURE — 25000003 PHARM REV CODE 250: Performed by: INTERNAL MEDICINE

## 2017-02-02 PROCEDURE — 93005 ELECTROCARDIOGRAM TRACING: CPT

## 2017-02-02 PROCEDURE — 94640 AIRWAY INHALATION TREATMENT: CPT

## 2017-02-02 PROCEDURE — 81000 URINALYSIS NONAUTO W/SCOPE: CPT

## 2017-02-02 PROCEDURE — 94761 N-INVAS EAR/PLS OXIMETRY MLT: CPT

## 2017-02-02 PROCEDURE — 63600175 PHARM REV CODE 636 W HCPCS: Performed by: INTERNAL MEDICINE

## 2017-02-02 PROCEDURE — 25000003 PHARM REV CODE 250: Performed by: NURSE PRACTITIONER

## 2017-02-02 PROCEDURE — 99233 SBSQ HOSP IP/OBS HIGH 50: CPT | Mod: ,,, | Performed by: PSYCHIATRY & NEUROLOGY

## 2017-02-02 PROCEDURE — 85025 COMPLETE CBC W/AUTO DIFF WBC: CPT

## 2017-02-02 PROCEDURE — 97110 THERAPEUTIC EXERCISES: CPT

## 2017-02-02 PROCEDURE — 99900035 HC TECH TIME PER 15 MIN (STAT)

## 2017-02-02 PROCEDURE — 27000339 *HC DAILY SUPPLY KIT

## 2017-02-02 PROCEDURE — 11000001 HC ACUTE MED/SURG PRIVATE ROOM

## 2017-02-02 PROCEDURE — 97116 GAIT TRAINING THERAPY: CPT

## 2017-02-02 PROCEDURE — 27200120 HC KIT IV START (RUSH ONLY)

## 2017-02-02 PROCEDURE — 99232 SBSQ HOSP IP/OBS MODERATE 35: CPT | Mod: ,,, | Performed by: FAMILY MEDICINE

## 2017-02-02 PROCEDURE — 63600175 PHARM REV CODE 636 W HCPCS: Performed by: FAMILY MEDICINE

## 2017-02-02 PROCEDURE — 95816 EEG AWAKE AND DROWSY: CPT | Mod: 26,,, | Performed by: PSYCHIATRY & NEUROLOGY

## 2017-02-02 RX ORDER — SOTALOL HYDROCHLORIDE 80 MG/1
80 TABLET ORAL 2 TIMES DAILY
Status: DISCONTINUED | OUTPATIENT
Start: 2017-02-02 | End: 2017-02-05 | Stop reason: HOSPADM

## 2017-02-02 RX ADMIN — VANCOMYCIN HYDROCHLORIDE 1000 MG: 1 INJECTION, POWDER, LYOPHILIZED, FOR SOLUTION INTRAVENOUS at 06:02

## 2017-02-02 RX ADMIN — MEROPENEM: 1 INJECTION, POWDER, FOR SOLUTION INTRAVENOUS at 01:02

## 2017-02-02 RX ADMIN — SOTALOL HYDROCHLORIDE 80 MG: 80 TABLET ORAL at 09:02

## 2017-02-02 RX ADMIN — MEROPENEM: 1 INJECTION, POWDER, FOR SOLUTION INTRAVENOUS at 09:02

## 2017-02-02 RX ADMIN — HYDROCHLOROTHIAZIDE 25 MG: 25 TABLET ORAL at 09:02

## 2017-02-02 RX ADMIN — IPRATROPIUM BROMIDE AND ALBUTEROL SULFATE 3 ML: .5; 3 SOLUTION RESPIRATORY (INHALATION) at 07:02

## 2017-02-02 RX ADMIN — ACETAMINOPHEN 650 MG: 325 TABLET, FILM COATED ORAL at 05:02

## 2017-02-02 RX ADMIN — SOTALOL HYDROCHLORIDE 40 MG: 80 TABLET ORAL at 09:02

## 2017-02-02 RX ADMIN — PANTOPRAZOLE SODIUM 40 MG: 40 TABLET, DELAYED RELEASE ORAL at 09:02

## 2017-02-02 RX ADMIN — IPRATROPIUM BROMIDE AND ALBUTEROL SULFATE 3 ML: .5; 3 SOLUTION RESPIRATORY (INHALATION) at 02:02

## 2017-02-02 RX ADMIN — ATORVASTATIN CALCIUM 40 MG: 40 TABLET, FILM COATED ORAL at 09:02

## 2017-02-02 RX ADMIN — IPRATROPIUM BROMIDE AND ALBUTEROL SULFATE 3 ML: .5; 3 SOLUTION RESPIRATORY (INHALATION) at 12:02

## 2017-02-02 RX ADMIN — IPRATROPIUM BROMIDE AND ALBUTEROL SULFATE 3 ML: .5; 3 SOLUTION RESPIRATORY (INHALATION) at 06:02

## 2017-02-02 RX ADMIN — WARFARIN SODIUM 1 MG: 1 TABLET ORAL at 05:02

## 2017-02-02 RX ADMIN — MEROPENEM: 1 INJECTION, POWDER, FOR SOLUTION INTRAVENOUS at 05:02

## 2017-02-02 RX ADMIN — VANCOMYCIN HYDROCHLORIDE 1000 MG: 1 INJECTION, POWDER, LYOPHILIZED, FOR SOLUTION INTRAVENOUS at 05:02

## 2017-02-02 NOTE — PT/OT/SLP PROGRESS
"Physical Therapy  Treatment    Hernesto Sofia   MRN: [id number]   Admitting Diagnosis: Atrial fibrillation with RVR    PT Received On: 17  PT Start Time: 932     PT Stop Time: 955    PT Total Time (min): 23 min       Billable Minutes:  Gait [id number] minutes and Therapeutic Exercise 8 minutes    Treatment Type: Treatment  PT/PTA: PT             General Precautions: Standard, fall  Orthopedic Precautions: N/A   Braces: N/A    Do you have any cultural, spiritual, [employer] conflicts, given your current situation?: none voiced    Subjective:  Communicated with patient prior to session.  "I feel better today."    Pain Ratin/10              Pain Rating Post-Intervention: 0/10    Objective:   Patient found with: peripheral IV, oxygen    Functional Mobility:  Bed Mobility:   Rolling/Turning to Left: Minimum assistance  Rolling/Turning Right: Minimum assistance  Scooting/Bridging: Minimum Assistance  Supine to Sit: Minimum Assistance  Sit to Supine: Minimum Assistance    Transfers:  Sit <> Stand Assistance: Minimum Assistance  Sit <> Stand Assistive Device: Rolling Walker  Bed <> Chair Technique: Stand Pivot  Bed <> Chair Assistance: Minimum Assistance  Bed <> Chair Assistive Device: Rolling Walker    Gait:   Gait Distance: 70 feet  Assistance 1: Minimum assistance  Gait Assistive Device: Rolling walker  Gait Pattern: swing-to gait  Gait Deviation(s): increased time in double stance, decreased velocity of limb motion, decreased toe-to-floor clearance, decreased weight-shifting ability, decreased step length  Gait Training:  Pt. Amb. 70' with RW, min. A.  VC's given to pt. For step placement and A.D. Placement.      Balance:   Static Sit: FAIR+: Able to take MINIMAL challenges from all directions  Dynamic Sit: FAIR+: Maintains balance through MINIMAL excursions of active trunk motion  Static Stand: POOR+: Needs MINIMAL assist to maintain  Dynamic stand: POOR: N/A     Therapeutic Activities and Exercises:  There. " Ex.:  Pt. Performed gentle A/A exercises of BLE's while seated in bedside chair, for N.M. Tone and strength and fluid dynamics. 1-on-1 BLE HC stretches performed with pt. BLE: ankle pumps, LAQ, seated hip flexion (2x10) performed with pt.     AM-PAC 6 CLICK MOBILITY  How much help from another person does this patient currently need?   1 = Unable, Total/Dependent Assistance  2 = A lot, Maximum/Moderate Assistance  3 = A little, Minimum/Contact Guard/Supervision  4 = None, Modified [place]/Independent    Turning over in bed (including adjusting bedclothes, sheets and blankets)?: 3  Sitting down on and standing up from a chair with arms (e.g., wheelchair, bedside commode, etc.): 3  Moving from lying on back to sitting on the side of the bed?: 3  Moving to and from a bed to a chair (including a wheelchair)?: 3  Need to walk in hospital room?: 3  Climbing 3-5 steps with a railing?: 1  Total Score: 16    AM-PAC Raw Score CMS G-Code Modifier Level of Impairment Assistance   6 % Total / Unable   7 - 9 CM 80 - 100% Maximal Assist   10 - 14 CL 60 - 80% Moderate Assist   15 - 19 CK 40 - 60% Moderate Assist   20 - 22 CJ 20 - 40% Minimal Assist   23 CI 1-20% SBA / CGA   24 CH 0% Independent/ Mod I     Patient left up in chair with all lines intact, call button in reach, RN notified and sitter present.    Assessment:  Hernesto Sofia is a 89 y.o. male with a medical diagnosis of Atrial fibrillation with RVR and presents with increased distance with gait training.  Pt. Is progressing towards PT POC goals.    Rehab identified problem list/impairments: Rehab identified problem list/impairments: weakness, impaired balance, impaired endurance, gait instability, decreased lower extremity function, impaired functional mobilty    Rehab potential is fair.    Activity tolerance: Fair    Discharge recommendations: Discharge Facility/Level Of Care Needs: home with home health, home health PT     Barriers to discharge: Barriers  to Discharge: None    Equipment recommendations: Equipment Needed After Discharge: none     GOALS:   Physical Therapy Goals        Problem: Physical Therapy Goal    Goal Priority Disciplines Outcome Goal Variances Interventions   Physical Therapy Goal     PT/OT, PT      Description:  Goals to be met by: 2/10/2017     Patient will increase functional independence with mobility by performin. Supine to sit with Stand-by Assistance  2. Sit to supine with Stand-by Assistance  3. Rolling to Left and Right with Stand-by Assistance.  4. Sit to stand transfer with Stand-by Assistance  5. Bed to chair transfer with Stand-by Assistance using Rolling Walker  6. Gait  x 150 feet with Stand-by Assistance using Rolling Walker.                 PLAN:    Patient to be seen  (PRN(Sat.,Sun.);1-2x/day(M-F))  to address the above listed problems via gait training, therapeutic activities, therapeutic exercises  Plan of Care expires:  (Upon D/C from facility)  Plan of Care reviewed with: patient, caregiver         Raheem Long, PT  2017

## 2017-02-02 NOTE — ASSESSMENT & PLAN NOTE
"Was brought to ER for this reason per ER records  Family not at bedside to give history  Patient reports "out of body experience" but can't elaborate further  Now alert and oriented to person, place, time  Neurology consulted-? Related to infection; now improved  ?? metabolic, infection vs cardiac with RVR    EEG pending    "

## 2017-02-02 NOTE — SUBJECTIVE & OBJECTIVE
Interval History:     Review of Systems   Constitutional: Positive for fever. Negative for activity change, fatigue and unexpected weight change.   HENT: Negative for congestion, ear pain, hearing loss, rhinorrhea and sore throat.    Eyes: Negative for visual disturbance.   Respiratory: Positive for cough. Negative for shortness of breath and wheezing.    Cardiovascular: Negative for chest pain, palpitations and leg swelling.   Gastrointestinal: Negative for abdominal pain, constipation, diarrhea, nausea and vomiting.   Genitourinary: Negative for decreased urine volume, dysuria, frequency and urgency.   Musculoskeletal: Negative for back pain, joint swelling and neck pain.   Skin: Positive for color change (left arm since iv infiltrated). Negative for rash and wound.        Swelling and erythema   Neurological: Negative for dizziness, tremors, weakness, light-headedness and headaches.     Objective:     Vital Signs (Most Recent):  Temp: 99.9 °F (37.7 °C) (02/02/17 0750)  Pulse: 98 (02/02/17 0750)  Resp: (!) 22 (02/02/17 0750)  BP: 118/72 (02/02/17 0750)  SpO2: 97 % (02/02/17 0740) Vital Signs (24h Range):  Temp:  [98 °F (36.7 °C)-102.7 °F (39.3 °C)] 99.9 °F (37.7 °C)  Pulse:  [] 98  Resp:  [16-22] 22  SpO2:  [93 %-97 %] 97 %  BP: (103-132)/(68-82) 118/72     Weight: 72.9 kg (160 lb 11.5 oz)  Body mass index is 25.17 kg/(m^2).    Intake/Output Summary (Last 24 hours) at 02/02/17 0859  Last data filed at 02/02/17 0000   Gross per 24 hour   Intake             1220 ml   Output              400 ml   Net              820 ml      Physical Exam   Constitutional: He is oriented to person, place, and time. He appears well-developed and well-nourished. No distress.   Sitting up in chair with no shortness of breath or distress   HENT:   Head: Normocephalic and atraumatic.   Right Ear: External ear normal.   Left Ear: External ear normal.   Eyes: Conjunctivae and EOM are normal. Pupils are equal, round, and reactive to  light. Right eye exhibits no discharge. Left eye exhibits no discharge.   Neck: Neck supple. No tracheal deviation present.   Cardiovascular: An irregularly irregular rhythm present. Exam reveals no gallop and no friction rub.    Murmur heard.  90s HR : irregulary irregular   Pulmonary/Chest: Effort normal. No respiratory distress. He has wheezes (diffuse). He has rales.   Abdominal: Soft. Bowel sounds are normal. He exhibits no distension. There is no tenderness. There is no rebound and no guarding.   Musculoskeletal: He exhibits edema (trace edema to ankles bilaterally).   Left arm with diffuse swelling, erythema and induration at site of extravasation of contrast dye   Lymphadenopathy:     He has no cervical adenopathy.   Neurological: He is alert and oriented to person, place, and time. No cranial nerve deficit.   Skin: Skin is warm and dry.   Psychiatric: He has a normal mood and affect. His behavior is normal.   Nursing note and vitals reviewed.      Significant Labs:   CBC:     Recent Labs  Lab 02/01/17  0531 02/02/17  0730   WBC 11.61 9.97   HGB 12.1* 13.3*   HCT 36.4* 38.0*    174     CMP:     Recent Labs  Lab 02/01/17  0531 02/02/17  0730   * 135*   K 4.4 3.9    101   CO2 23 23    105   BUN 25* 19   CREATININE 0.9 0.8   CALCIUM 8.8 9.2   ANIONGAP 7* 11   EGFRNONAA >60 >60       Significant Imaging:   CXR: The heart is moderately enlarged.  Calcified atheromatous disease affects the aorta.  Left-sided pacemaker devices in place.  The lungs are clear.  The skeletal structures are intact.    Repeat CXR: Chest, 1 view: Suspected left retrocardiac opacity, new from the prior examination, which could represent pleural fluid, atelectasis or pneumonia.  The right lung is clear.  The heart is moderately enlarged.  Callus but atheromatous disease affects the aorta.  Left-sided pacemaker device is in place.    CT: Age-appropriate generalized cerebral volume loss with moderate degree of  patchy decreased attenuation supratentorial white matter suggestive for chronic microvascular ischemic change similarly seen on the prior.     No evidence for acute intracranial hemorrhage or definite new abnormal parenchymal attenuation. Clinical correlation and further evaluation as warranted.    Blood culture: no growth

## 2017-02-02 NOTE — PROCEDURES
"Hernesto Sofia is a 89 y.o. male patient.    Temp: 99.9 °F (37.7 °C) (17 0750)  Pulse: 85 (17 1000)  Resp: (!) 22 (17 0750)  BP: 118/72 (17 0750)  SpO2: 97 % (17 0740)  Weight: 72.9 kg (160 lb 11.5 oz) (17 1445)  Height: 5' 7" (170.2 cm) (17 1445)       EEG,w/awake & drowsy record  Date/Time: 2017 10:18 AM  Performed by: TIFFANY ARIZMENDI  Authorized by: CHEO CAST         EEG REPORT      Patients Name: Hernesto Sofia  Date of Recordin17  Technician: Carmen  Patient :17    Referring Physician: Dr. Arizmendi  Reason for Exam: AMS  Notable medications: None significant      INTRODUCTION:  This EEG was  recorded using 10-channels and the International 10/20 electrode placement system.  The patient was not sleep deprived.    FINDINGS:  This EEG was recording during wakefulness and drowsiness only.  An 8 Hz posterior dominant rhythm was persistently observed and of normal amplitude and symmetry.   Mild muscle artifact, Eye Blinks, Beta frequency artifact occurred throughout the recording  Focal slowing was not observed.   Epileptiform activity was not observed    Hyperventilation: Was not Performed    Photic Stimulation: Was not Performed    Clinical Impression:  Normal awake and  Drowsy EEG.      MD Tiffany Villegas  2017  "

## 2017-02-02 NOTE — ASSESSMENT & PLAN NOTE
Continue current plan and treat with merem and vanc.  Monitoring fever trend and de-escalate when appropriate

## 2017-02-02 NOTE — ASSESSMENT & PLAN NOTE
Trigeminal neuralgia vs cervical radicular pain in differential  ? Start low dose gabapentin for proph  CTA head and neck was attempted on Wed, IV infiltrated with contrast, will re-attempt today  Dr. Arizmendi following

## 2017-02-02 NOTE — PROGRESS NOTES
"Hernesto Sofia is a 89 y.o. male patient.looks great    Active Hospital Problems    Diagnosis  POA    *Atrial fibrillation with RVR [I48.91]  Yes    Aortic stenosis [I35.0]  Yes    Leukocytosis [D72.829]  Yes    HLD (hyperlipidemia) [E78.5]  Yes    HTN (hypertension) [I10]  Yes    Chronic right-sided headaches [R51]  Yes    Acute confusional state [F05]  Yes    Cervical radicular pain [M54.12]  Yes      Resolved Hospital Problems    Diagnosis Date Resolved POA   No resolved problems to display.     Temp: 99.9 °F (37.7 °C) (02/02/17 0750)  Pulse: 98 (02/02/17 0750)  Resp: (!) 22 (02/02/17 0750)  BP: 118/72 (02/02/17 0750)  SpO2: 97 % (02/02/17 0740)  Weight: 72.9 kg (160 lb 11.5 oz) (01/30/17 1445)  Height: 5' 7" (170.2 cm) (01/30/17 1445)    Subjective:  Symptoms:  Worsening.  He reports shortness of breath, cough, chest pain, weakness and anxiety.    Diet:  Adequate intake.    Activity level: Impaired due to weakness.    Pain:  He complains of pain that is mild.      Objective:  General Appearance:  Ill-appearing.    Vital signs: (most recent): Blood pressure 118/72, pulse 98, temperature 99.9 °F (37.7 °C), resp. rate (!) 22, height 5' 7" (1.702 m), weight 72.9 kg (160 lb 11.5 oz), SpO2 97 %.  Fever.    Output: Producing urine and producing stool.    Lungs:  Normal respiratory rate.  He is not in respiratory distress.  No stridor.  There are decreased breath sounds.  No wheezes, rales or rhonchi.    Heart: Tachycardia.  Irregular rhythm.  Positive for murmur.  No gallop or friction rub.   Chest: No chest wall tenderness.  Symmetric chest wall expansion.   Extremities: Normal range of motion.  There is no venous stasis, deformity, effusion, local swelling or dependent edema.    Neurological: Patient is alert and oriented to person, place and time.  Normal strength.  Patient has normal reflexes, normal muscle tone and normal coordination.    Skin:  Warm and dry.  No rash, cyanosis or ulceration.   Abdomen: " Abdomen is soft, flat and non-distended.  There are no signs of ascites.  Hypoactive bowel sounds.   There is no abdominal tenderness.     Pupils:  Pupils are equal, round, and reactive to light.    Pulses: Distal pulses are intact.      Assessment:  (SIRS  COPD exacerbation  Clinically  UTI  A Fib    ).     Plan:   Doing much hoda   WBC 11ooo.       Rodolfo Carranza MD  2/2/2017

## 2017-02-02 NOTE — PROGRESS NOTES
[facility]  Cardiology  Progress Note    Patient Name: Hernesto Sofia  MRN: [id number]  Admission Date: 1/30/2017  Hospital Length of Stay: 2 days  Code Status: Full Code   Attending Physician: Roxanne Pandey MD   Primary Care Physician: Hasmukh Pederson MD  Expected Discharge Date:   Principal Problem:Atrial fibrillation with RVR    Subjective:     Hospital Course: admitted with AMS    Interval History:  CIS consutled for AF/RVR in setting of fever, rate now controlled    Review of Systems   Constitutional: Negative.    HENT: Negative.    Eyes: Negative.    Respiratory: Negative.    Cardiovascular: Negative.    Endocrine: Negative.    Genitourinary: Negative.    Musculoskeletal: Negative.    Skin: Negative.    Allergic/Immunologic: Negative.    Neurological: Negative.    Hematological: Negative.    Psychiatric/Behavioral: Negative.      Objective:     Vital Signs (Most Recent):  Temp: 99.9 °F (37.7 °C) (02/02/17 0750)  Pulse: 98 (02/02/17 0750)  Resp: (!) 22 (02/02/17 0750)  BP: 118/72 (02/02/17 0750)  SpO2: 97 % (02/02/17 0740) Vital Signs (24h Range):  Temp:  [98 °F (36.7 °C)-102.7 °F (39.3 °C)] 99.9 °F (37.7 °C)  Pulse:  [] 98  Resp:  [16-22] 22  SpO2:  [93 %-97 %] 97 %  BP: (103-132)/(68-82) 118/72     Weight: 72.9 kg (160 lb 11.5 oz)  Body mass index is 25.17 kg/(m^2).    SpO2: 97 %  O2 Device (Oxygen Therapy): nasal cannula      Intake/Output Summary (Last 24 hours) at 02/02/17 0912  Last data filed at 02/02/17 0000   Gross per 24 hour   Intake             1220 ml   Output              400 ml   Net              820 ml       Lines/Drains/Airways     Peripheral Intravenous Line                 Peripheral IV - Single Lumen 02/01/17 1500 Right Wrist less than 1 day         Peripheral IV - Single Lumen 02/02/17 0200 Anterior Upper Arm less than 1 day                Physical Exam   Constitutional: He is oriented to person, place, and time. He appears well-developed.   HENT:   Head:  Normocephalic.   Eyes: Conjunctivae are normal.   Neck: Normal range of motion.   Cardiovascular:   irreg irreg   Pulmonary/Chest: Breath sounds normal.   Musculoskeletal: Normal range of motion.   Neurological: He is alert and oriented to person, place, and time.   Skin: Skin is warm and dry.   Psychiatric: He has a normal mood and affect.       Significant Labs: reviewed    Significant Imaging: reviewed    Assessment and Plan:     AMS in a frail 89 yo; currently appears stable without c/o but recurrence of fever  No CHF on exam   Chronic A fib; rate somewhat elevated because of infectious process; now controlled  S/p PM for SSS  HTN controlled LVEF 50% LVH AS 1.3 Mild MR, PAS 38 3/12, stable per echo yesterday  Normal stress perf 2009   Leukocytosis    Plan:  Adjust coumadin, limit use of albuterol ; increase sotalol  INR qd  ekg in am        Active Diagnoses:    Diagnosis Date Noted POA    PRINCIPAL PROBLEM:  Atrial fibrillation with RVR [I48.91] 01/30/2017 Yes    Aortic stenosis [I35.0] 02/01/2017 Yes    Leukocytosis [D72.829] 01/30/2017 Yes    HLD (hyperlipidemia) [E78.5] 01/30/2017 Yes    HTN (hypertension) [I10] 01/30/2017 Yes    Chronic right-sided headaches [R51] 01/30/2017 Yes    Acute confusional state [F05] 01/30/2017 Yes    Cervical radicular pain [M54.12] 01/30/2017 Yes      Problems Resolved During this Admission:    Diagnosis Date Noted Date Resolved POA       VTE Risk Mitigation         Ordered     Medium Risk of VTE  Once      01/30/17 1514     Place sequential compression device  Until discontinued      01/30/17 1514          Anticipated Disposition: home    Discharge Needs: ETT as outpatient    Jonna Monreal NP  Cardiology  [facility]    I attest that I have personally seen and examined this patient. I have reviewed and discussed the management in detail as outlined above.

## 2017-02-02 NOTE — PT/OT/SLP EVAL
"Physical Therapy  Evaluation    Hernesto Sofia   MRN: [id number]   Admitting Diagnosis: Atrial fibrillation with RVR    PT Received On: 02/02/17  PT Start Time: 1200     PT Stop Time: 1230    PT Total Time (min): 30 min       Billable Minutes:  Evaluation 15 minutes and Gait [id number] minutes    Diagnosis: Atrial fibrillation with RVR      Past Medical History   Diagnosis Date    A-fib     Anticoagulant long-term use     Hyperlipidemia     Hypertension     Pacemaker       History reviewed. No pertinent past surgical history.    Referring physician: JASWINDER Cameron NP  Date referred to PT: 2/1/2017    General Precautions: Standard, fall  Orthopedic Precautions: N/A   Braces:         Do you have any cultural, spiritual, Rastafarian conflicts, given your current situation?: none voiced    Patient History:  Lives With: alone  Living Arrangements: house  Home Layout: Able to live on 1st floor  Equipment Currently Used at Home: shower chair, walker, rolling  DME owned (not currently used): rolling walker    Previous Level of Function:  Ambulation Skills: needs device  Transfer Skills: needs device  ADL Skills: needs device  Work/Leisure Activity: needs device    Subjective:  Communicated with patient prior to session.  "I'm a little weak."  Chief Complaint: Decreased general strength  Patient goals: Increase distance with ambulation                         Objective:         Cognitive Exam:  Oriented to: Person, Place, Time and Situation    Follows Commands/attention: Follows multistep  commands  Communication: clear/fluent  Safety awareness/insight to disability: intact    Physical Exam:  Postural examination/scapula alignment: Rounded shoulder and Head forward    Skin integrity: Please see nursing notes re: Left Foream  Edema: None noted    Sensation:   Intact    Upper Extremity Range of Motion:  Right Upper Extremity: WFL  Left Upper Extremity: WFL    Upper Extremity Strength:  Right Upper Extremity: WFL  Left Upper " Extremity: WFL    Lower Extremity Range of Motion:  Right Lower Extremity: WFL  Left Lower Extremity: WFL    Lower Extremity Strength:  Right Lower Extremity: Deficits: Grossly 4-/5  Left Lower Extremity: Deficits: Grossly 4-/5     Fine motor coordination:  Intact    Gross motor coordination: WFL    Functional Mobility:  Bed Mobility: min. A. With bed mobility as noted in flowsheet.       Transfers: min. A. With transfers sit<>stand, and EOB<>W/C with stand pivot as noted in flowsheet       Gait: Gait Training:  Pt. Amb. 10' x 2 bouts with RW, min. A.  VC's given to pt. For step placement and A.D. Placement.           Balance:   Static Sit: FAIR+: Able to take MINIMAL challenges from all directions  Dynamic Sit: FAIR+: Maintains balance through MINIMAL excursions of active trunk motion  Static Stand: POOR+: Needs MINIMAL assist to maintain  Dynamic stand: POOR: N/A        AM-PAC 6 CLICK MOBILITY  How much help from another person does this patient currently need?   1 = Unable, Total/Dependent Assistance  2 = A lot, Maximum/Moderate Assistance  3 = A little, Minimum/Contact Guard/Supervision  4 = None, Modified [place]/Independent          AM-PAC Raw Score CMS G-Code Modifier Level of Impairment Assistance   6 % Total / Unable   7 - 9 CM 80 - 100% Maximal Assist   10 - 14 CL 60 - 80% Moderate Assist   15 - 19 CK 40 - 60% Moderate Assist   20 - 22 CJ 20 - 40% Minimal Assist   23 CI 1-20% SBA / CGA   24 CH 0% Independent/ Mod I     Patient left up in chair with all lines intact, call button in reach, RN notified and family present.    Assessment:     Factors that may affect patient's status:  [x] Patient's age [] Time since onset of injury/illness/exacerbation  []Prior Level of function       [x] Current level of function [x] Status of current condition []Patient's cognitive status and safety concerns      [] Patient's level of motivation    [] Patient's home situation (environment and family support)  [x]  "Objective examination findings [] Goals and goal agreement with the patient        [] Rehab potential (prognosis) and probable outcome    [] Expected progression of patient    Criteria:     History:    1-2 Personal Factors and/or co-morbidity - 64483 (see PLOFand Med Hx)  Examination of Body System:  addressing a total of 3 or more elements - 60435 (see "Rehab Impairments" below  Clinical Presentation:  Evolving - 74672  Clinical Decision Making (Complexity):  Moderate - 17455      On examination of body system using standardized tests and measures patient presents with 3 or more elements from any of the following: body structures and functions, activity limitations, and/or participation restrictions.  Leading to a clinical presentation that is considered evolving with changing characteristics      Hernesto Sofia is a 89 y.o. male with a medical diagnosis of Atrial fibrillation with RVR and presents with decreased general strength and endurance and mobility deficits.  Pt. Would benefit from PT to increase independence with ADL's.    Rehab identified problem list/impairments: Rehab identified problem list/impairments: weakness, impaired balance, impaired functional mobilty, gait instability, decreased lower extremity function, impaired endurance    Rehab potential is fair.    Activity tolerance: Fair    Discharge recommendations: Discharge Facility/Level Of Care Needs: home with home health, home health PT     Barriers to discharge: Barriers to Discharge: None    Equipment recommendations: Equipment Needed After Discharge: none     GOALS:   Physical Therapy Goals        Problem: Physical Therapy Goal    Goal Priority Disciplines Outcome Goal Variances Interventions   Physical Therapy Goal     PT/OT, PT      Description:  Goals to be met by: 2/10/2017     Patient will increase functional independence with mobility by performin. Supine to sit with Stand-by Assistance  2. Sit to supine with Stand-by " Assistance  3. Rolling to Left and Right with Stand-by Assistance.  4. Sit to stand transfer with Stand-by Assistance  5. Bed to chair transfer with Stand-by Assistance using Rolling Walker  6. Gait  x 150 feet with Stand-by Assistance using Rolling Walker.                 PLAN:    Patient to be seen  (PRN(Sat.,Sun.);1-2x/day(M-F)) to address the above listed problems via gait training, therapeutic activities, therapeutic exercises  Plan of Care expires:  (Upon D/C from facility)  Plan of Care reviewed with: patient, caregiver          Raheem Long, PT  02/02/2017

## 2017-02-02 NOTE — PLAN OF CARE
Problem: Fall Risk (Adult)  Goal: Absence of Falls  Patient will demonstrate the desired outcomes by discharge/transition of care.   Outcome: Ongoing (interventions implemented as appropriate)  Pt free of falls/incidents. Fall precautions maintained. Bed alarm engaged.    Problem: Patient Care Overview  Goal: Plan of Care Review  Outcome: Ongoing (interventions implemented as appropriate)  Disoriented/Confused at times. 2L NC, iv abx changed today. SCD's. Pt aware of plan of care. No questions or concerns at this time.

## 2017-02-02 NOTE — PROGRESS NOTES
"[facility] Hospital Medicine  Progress Note    Patient Name: Hernesto Sofia  MRN: [id number]  Patient Class: IP- Inpatient   Admission Date: 1/30/2017  Length of Stay: 1 days  Attending Physician: Roxanne Pandey MD  Primary Care Provider: Hasmukh Pederson MD        Subjective:     Principal Problem:Atrial fibrillation with RVR    HPI:  Patient presented to ER with confusion and shortness of breath. Was brought in by his daughter who is not at the bedside this evening. Patient reports he was having an "out of body experience" and that's why he was brought here. Doesn't give much more detail than this. He denies SOB, chest pains, palpitations, LE edema, orthopnea. Denies dysuria, hematuria but does report frequency and incomplete bladder emptying. He denies abdominal pain, n/v/d/c. He is alert and oriented this evening but is a poor historian.     Hospital Course:  Was found to bed in a-fib with RVR with rate low 100s. CXR was normal, no consolidation or pulmonary edema. CIS and pulmonary consulted to assist with management. CT head was negative, neurology consulted in ED and planned for MRI brain but he has pacemaker. Was to start on dilt gtt but HR low 100s and BP on the low side so managing with IV metoprolol.      Since metoprolol his HR is around 66-98.     He is on Zithromax and ceftriaxone x 3 days today. Had low grade fever last night up to 100.6.       Interval History: now on floor    Review of Systems   Constitutional: Negative for activity change, fatigue, fever and unexpected weight change.   HENT: Negative for congestion, ear pain, hearing loss, rhinorrhea and sore throat.    Eyes: Negative for visual disturbance.   Respiratory: Negative for cough, shortness of breath and wheezing.    Cardiovascular: Negative for chest pain, palpitations and leg swelling.   Gastrointestinal: Negative for abdominal pain, constipation, diarrhea, nausea and vomiting.   Genitourinary: Negative for " decreased urine volume, dysuria, frequency and urgency.   Musculoskeletal: Negative for back pain, joint swelling and neck pain.   Skin: Negative for color change, rash and wound.   Neurological: Negative for dizziness, tremors, weakness, light-headedness and headaches.     Objective:     Vital Signs (Most Recent):  Temp: 98.8 °F (37.1 °C) (02/01/17 0400)  Pulse: 66 (02/01/17 0725)  Resp: 18 (02/01/17 0725)  BP: 111/66 (02/01/17 0400)  SpO2: (!) 94 % (02/01/17 0725) Vital Signs (24h Range):  Temp:  [98.8 °F (37.1 °C)-100.6 °F (38.1 °C)] 98.8 °F (37.1 °C)  Pulse:  [] 66  Resp:  [16-26] 18  SpO2:  [90 %-97 %] 94 %  BP: ()/(62-77) 111/66     Weight: 72.9 kg (160 lb 11.5 oz)  Body mass index is 25.17 kg/(m^2).    Intake/Output Summary (Last 24 hours) at 02/01/17 0756  Last data filed at 02/01/17 0600   Gross per 24 hour   Intake          1438.75 ml   Output              450 ml   Net           988.75 ml      Physical Exam   Constitutional: He is oriented to person, place, and time. He appears well-developed and well-nourished. No distress.   HENT:   Head: Normocephalic and atraumatic.   Right Ear: External ear normal.   Left Ear: External ear normal.   Eyes: Conjunctivae and EOM are normal. Pupils are equal, round, and reactive to light. Right eye exhibits no discharge. Left eye exhibits no discharge.   Neck: Neck supple. No tracheal deviation present.   Cardiovascular: An irregularly irregular rhythm present. Exam reveals no gallop and no friction rub.    No murmur heard.  90s HR : irregulary irregular   Pulmonary/Chest: Effort normal. No respiratory distress. He has wheezes (diffuse). He has no rales.   Abdominal: Soft. Bowel sounds are normal. He exhibits no distension. There is no tenderness. There is no rebound and no guarding.   Musculoskeletal: He exhibits no edema.   Lymphadenopathy:     He has no cervical adenopathy.   Neurological: He is alert and oriented to person, place, and time. No cranial  nerve deficit.   Skin: Skin is warm and dry.   Psychiatric: He has a normal mood and affect. His behavior is normal.   Nursing note and vitals reviewed.      Significant Labs:   CBC:     Recent Labs  Lab 01/30/17  0937 01/31/17  0404 02/01/17  0531   WBC 21.70* 14.97* 11.61   HGB 15.2 12.4* 12.1*   HCT 44.2 36.5* 36.4*    153 153     CMP:     Recent Labs  Lab 01/30/17 0937 01/31/17  0404 02/01/17  0531    138 135*   K 4.5 4.1 4.4    106 105   CO2 26 24 23   * 114* 106   BUN 28* 28* 25*   CREATININE 1.1 0.9 0.9   CALCIUM 9.9 8.9 8.8   PROT 7.5 6.1  --    ALBUMIN 3.4* 2.7*  --    BILITOT 1.2* 1.9*  --    ALKPHOS 90 67  --    AST 26 19  --    ALT 22 15  --    ANIONGAP 9 8 7*   EGFRNONAA 59* >60 >60       Significant Imaging: CXR: I have reviewed all pertinent results/findings within the past 24 hours and my personal findings are:    The heart is moderately enlarged.  Calcified atheromatous disease affects the aorta.  Left-sided pacemaker devices in place.  The lungs are clear.  The skeletal structures are intact.      CT   Age-appropriate generalized cerebral volume loss with moderate degree of patchy decreased attenuation supratentorial white matter suggestive for chronic microvascular ischemic change similarly seen on the prior.     No evidence for acute intracranial hemorrhage or definite new abnormal parenchymal attenuation. Clinical correlation and further evaluation as warranted.    Blood culture ; no growth       Assessment/Plan:      * Atrial fibrillation with RVR  Was on coumadin as outpatient, INR 3.3 today--HOLD for now  Daily INR. Resume Home dose 5mg Mon, Wed, Fri and 2.5mg Tues, Thurs, Sat and Sun; watch closely on Zmax  On sotalol, will continue  Was not started on dilt gtt due to BP  IV metoprolol PRN for HR > 110  TTE ordered-reviewed; AS mod  CIS consulted      Leukocytosis  WBC 21K on admission>>14>>11  Source is unclear, likely bronchitis  UA is clear, CXR is  "negative  Blood cx NGTD  Started azithro and rocephin as wheezing and having some SOB now---but is this infection vs cardiac due to RVR??  CXR yesterday with small effusion; d/c ivf  Tmax last night, 100.6  Check flu      HLD (hyperlipidemia)  Continue home atorvastatin      HTN (hypertension)  On HCTZ prior to admission  Hold until BP recovers (likely low due to RVR and ??infection)      Chronic right-sided headaches  This seems to be a chronic issue for him  CT head negative  ESR OK--less likely temporal arterities, not tender on exam  Can't do MRI due to PPM  Neurology was consulted in ED-Trigeminal neuralgia vs cervical radicular pain in differential; ? Start low dose gabapentin for proph  Tylenol PRN for pain for now  EEG pending    Acute confusional state  Was brought to ER for this reason per ER records  Family not at bedside to give history  Patient reports "out of body experience" but can't elaborate further  Now alert and oriented to person, place, time  Neurology consulted-? Related to infection; now improved  ??metabolic, infection vs cardiac with RVR    EEG pending      Cervical radicular pain  Trigeminal neuralgia vs cervical radicular pain in differential  ? Start low dose gabapentin for proph  CTA head and neck today  Dr. Arizmendi following      Aortic stenosis  Cards following      VTE Risk Mitigation         Ordered     Medium Risk of VTE  Once      01/30/17 1514     Place sequential compression device  Until discontinued      01/30/17 1514          Bryant Cummings MD  Department of Hospital Medicine   [facility]  "

## 2017-02-02 NOTE — ASSESSMENT & PLAN NOTE
This seems to be a chronic issue for him  CT head negative  ESR OK--less likely temporal arteritis, not tender on exam  Can't do MRI due to PPM  Neurology was consulted in ED-Trigeminal neuralgia vs cervical radicular pain in differential; ? Start low dose gabapentin for proph  Tylenol PRN for pain for now  EEG pending

## 2017-02-02 NOTE — ASSESSMENT & PLAN NOTE
Was on coumadin as outpatient, INR 3.3 wed-decreased dose to 1 mg q hs;   INR 2.6  Daily INR. Home dose 5mg Mon, Wed, Fri and 2.5mg Tues, Thurs, Sat and Sun; watch closely on a/b  On sotalol, will continue-now rate controlled.   Was not started on dilt gtt due to BP  IV metoprolol PRN for HR > 110  TTE ordered-reviewed; AS mod  CIS following

## 2017-02-02 NOTE — PT/OT/SLP PROGRESS
"Physical Therapy  Treatment    Hernesto Sofia   MRN: [id number]   Admitting Diagnosis: Atrial fibrillation with RVR    PT Received On: 17  PT Start Time: 1302     PT Stop Time: 1317    PT Total Time (min): 15 min       Billable Minutes:  Gait [id number] minutes    Treatment Type: Treatment  PT/PTA: PT             General Precautions: Standard, fall  Orthopedic Precautions: N/A   Braces: N/A    Do you have any cultural, spiritual, Evangelical conflicts, given your current situation?: none voiced    Subjective:  Communicated with patient prior to session.  "Let's go walk."    Pain Ratin/10              Pain Rating Post-Intervention: 0/10    Objective:   Patient found with: peripheral IV, oxygen    Functional Mobility:  Bed Mobility:   Rolling/Turning to Left: Minimum assistance  Rolling/Turning Right: Minimum assistance  Scooting/Bridging: Minimum Assistance  Supine to Sit: Minimum Assistance  Sit to Supine: Minimum Assistance    Transfers:  Sit <> Stand Assistance: Minimum Assistance  Sit <> Stand Assistive Device: Rolling Walker  Bed <> Chair Technique: Stand Pivot  Bed <> Chair Assistance: Minimum Assistance  Bed <> Chair Assistive Device: Rolling Walker    Gait:   Gait Distance: 80 feet  Assistance 1: Minimum assistance  Gait Assistive Device: Rolling walker  Gait Pattern: swing-to gait  Gait Deviation(s): increased time in double stance, decreased velocity of limb motion, decreased toe-to-floor clearance, decreased step length  Gait Training: Pt. Amb. 80' with RW, min. A.  VC's given to pt. For step placement and A.D. Placement and step pattern.    Balance:   Static Sit: FAIR+: Able to take MINIMAL challenges from all directions  Dynamic Sit: FAIR+: Maintains balance through MINIMAL excursions of active trunk motion  Static Stand: POOR+: Needs MINIMAL assist to maintain  Dynamic stand: POOR: N/A        AM-PAC 6 CLICK MOBILITY  How much help from another person does this patient currently need?   1 = " Unable, Total/Dependent Assistance  2 = A lot, Maximum/Moderate Assistance  3 = A little, Minimum/Contact Guard/Supervision  4 = None, Modified [place]/Independent    Turning over in bed (including adjusting bedclothes, sheets and blankets)?: 3  Sitting down on and standing up from a chair with arms (e.g., wheelchair, bedside commode, etc.): 3  Moving from lying on back to sitting on the side of the bed?: 3  Moving to and from a bed to a chair (including a wheelchair)?: 3  Need to walk in hospital room?: 3  Climbing 3-5 steps with a railing?: 1  Total Score: 16    AM-PAC Raw Score CMS G-Code Modifier Level of Impairment Assistance   6 % Total / Unable   7 - 9 CM 80 - 100% Maximal Assist   10 - 14 CL 60 - 80% Moderate Assist   15 - 19 CK 40 - 60% Moderate Assist   20 - 22 CJ 20 - 40% Minimal Assist   23 CI 1-20% SBA / CGA   24 CH 0% Independent/ Mod I     Patient left up in chair with all lines intact, call button in reach and RN notified.    Assessment:  Hernesto Sofia is a 89 y.o. male with a medical diagnosis of Atrial fibrillation with RVR and presents with increased distance with gait training.  Pt. Is progressing towards PT POC goals.    Rehab identified problem list/impairments: Rehab identified problem list/impairments: weakness, impaired balance, impaired endurance, impaired functional mobilty, gait instability, decreased lower extremity function    Rehab potential is fair.    Activity tolerance: Fair    Discharge recommendations: Discharge Facility/Level Of Care Needs: home with home health, home health PT     Barriers to discharge: Barriers to Discharge: None    Equipment recommendations: Equipment Needed After Discharge: none     GOALS:   Physical Therapy Goals        Problem: Physical Therapy Goal    Goal Priority Disciplines Outcome Goal Variances Interventions   Physical Therapy Goal     PT/OT, PT      Description:  Goals to be met by: 2/10/2017     Patient will increase functional  independence with mobility by performin. Supine to sit with Stand-by Assistance  2. Sit to supine with Stand-by Assistance  3. Rolling to Left and Right with Stand-by Assistance.  4. Sit to stand transfer with Stand-by Assistance  5. Bed to chair transfer with Stand-by Assistance using Rolling Walker  6. Gait  x 150 feet with Stand-by Assistance using Rolling Walker.                 PLAN:    Patient to be seen  (PRN(Sat.,Sun.);1-2x/day(M-F))  to address the above listed problems via gait training, therapeutic activities, therapeutic exercises  Plan of Care expires:  (Upon D/C from facility)  Plan of Care reviewed with: patient, caregiver         Raheem Long, PT  2017

## 2017-02-02 NOTE — PROGRESS NOTES
[facility]  Neurology  Progress Note     Patient Name: Hernesto Sofia  MRN: [id number]  Admission Date: 1/30/2017  Hospital Length of Stay: 0 days  Code Status: Full Code   Attending Provider: Roxanne Pandey MD  Primary Care Physician: Hasmukh Pederson MD   Principal Problem:Atrial fibrillation with RVR        Subjective:      Interval History: Patient states he no longer has any head or neck pain. His face does not hurt  He continues to have fever  His IV for the CTA infiltrated and CTA could not be performed          Review of Systems   Constitutional: Negative for fatigue.   HENT: Negative for drooling.   Eyes: Negative for photophobia.   Respiratory: Negative for wheezing.   Cardiovascular: Negative for chest pain.   Gastrointestinal: Negative for blood in stool.   Genitourinary: Negative for hematuria.   Musculoskeletal: Negative for neck pain.   Neurological: Negative for tremors.   Psychiatric/Behavioral: Negative for hallucinations.      Objective:      Vitals:    02/02/17 0400 02/02/17 0600 02/02/17 0707 02/02/17 0740   BP: 130/68      BP Location: Right arm      Patient Position: Lying      BP Method: Automatic      Pulse: 78 92 94 90   Resp: 18   (!) 22   Temp: 98 °F (36.7 °C)      TempSrc: Oral      SpO2: 96%   97%   Weight:       Height:              Tmax 102.7 noted    Weight: 72.9 kg (160 lb 11.5 oz)  Body mass index is 25.17 kg/(m^2).     Physical Exam         Gen Appearance, well developed/nourished in no apparent distress  CV: 2+ distal pulses with no edema or swelling  Neuro:  MS: Awake, alert, oriented to place when prompted, person,but not to time, and well intact to situation. Sustains attention. Recent recall is forgotten/remote memory intact, Language is full to spontaneous speech/repetition/naming/comprehension. Fund of Knowledge is likely less than expected  CN: Optic discs are flat with normal vasculature, PERRL, Extraoccular movements and visual fields are full.  Normal facial sensation and strength, Hearing symmetric, Tongue and Palate are midline and strong. Shoulder Shrug symmetric and strong.  Motor: Normal bulk, tone, no abnormal movements. 5/5 strength bilateral upper/lower extremities with 1+ reflexes and no clonus  Sensory: symmetric to temp, and vibration Romberg not done  Cerebellar: Finger-nose,Heal-shin, Rapid alternating movements intact  Gait: Not done. Patient usually ambulates with a walker  Supple neck and denies pain on head movement  Left arm mildy swollen at Antebrachial fossa from IV infiltration        Significant Labs: WBC normalized  Creatinine normal  Bilirubin normal  Ammonia and B12 normal     Significant Imaging: Xray C spine: degenerative changes    EEG done/ pending review     Assessment and Plan:      * Atrial fibrillation with RVR  Improved  On anticoagulation with coumadin for CVA prevention. No signs of CVA on exam. Can't have MRI due to pacemaker in situ     Leukocytosis  Resolved and mental status seems improved with infectious issues (bronchitis) per sitter prior. Would like to confirm with family as well.   Change in antibiotic coverage noted     Acute confusional state  -Will review EEG   -History of AMS with prior acute metabolic or infectious illness in this patient  -Consider LP if fever does not decline as discussed with primary team  -However, clinically patient is improved     Cervical radicular pain  -Patient reporting 3 weeks of pain radiating to and from the right neck with right sided headaches vs facial neuralgia  -Ordered CTA head and neck to rule out vascular cause and also to look further at structures of C spine, however, patient had IV access problem-- can try again tomorrow but currently states he is head and neck pain free.    -Consider prevention agent like low dose gabapentin if neuralgia/radicular pain recurrs  -?Trigeminal neuralgia vs cervical radicular pain in differential. History difficult.      Will follow    Garrison Arizmendi MD  Neurology  [facility]    Addendum:   Reviewed Chest Xray findings today with primary team. New opacity noted, source of infection likely. No LP at this time.

## 2017-02-02 NOTE — PLAN OF CARE
The patient had a fairly uneventful night. He slept off and on .  No compliaints of pain/SOB or discomfort. He did stand and bedside once with assistance to use urinal but was incontinent of urine x2. Left are remains edematous and reddish -elevated on pillow. Voices understanding of hospitalization and plan of care. Daughter and sitter will be here later today.

## 2017-02-03 PROBLEM — E87.6 HYPOKALEMIA: Status: ACTIVE | Noted: 2017-02-03

## 2017-02-03 LAB
ANION GAP SERPL CALC-SCNC: 10 MMOL/L
BASOPHILS # BLD AUTO: 0.05 K/UL
BASOPHILS NFR BLD: 0.6 %
BUN SERPL-MCNC: 19 MG/DL
CALCIUM SERPL-MCNC: 8.9 MG/DL
CHLORIDE SERPL-SCNC: 102 MMOL/L
CO2 SERPL-SCNC: 25 MMOL/L
CREAT SERPL-MCNC: 0.8 MG/DL
DIFFERENTIAL METHOD: ABNORMAL
EOSINOPHIL # BLD AUTO: 0.4 K/UL
EOSINOPHIL NFR BLD: 4.7 %
ERYTHROCYTE [DISTWIDTH] IN BLOOD BY AUTOMATED COUNT: 12.7 %
EST. GFR  (AFRICAN AMERICAN): >60 ML/MIN/1.73 M^2
EST. GFR  (NON AFRICAN AMERICAN): >60 ML/MIN/1.73 M^2
GLUCOSE SERPL-MCNC: 109 MG/DL
HCT VFR BLD AUTO: 35.5 %
HGB BLD-MCNC: 12.5 G/DL
INR PPP: 2.1
LYMPHOCYTES # BLD AUTO: 1.3 K/UL
LYMPHOCYTES NFR BLD: 15.4 %
MCH RBC QN AUTO: 32.6 PG
MCHC RBC AUTO-ENTMCNC: 35.2 %
MCV RBC AUTO: 92 FL
MONOCYTES # BLD AUTO: 1.1 K/UL
MONOCYTES NFR BLD: 13.7 %
NEUTROPHILS # BLD AUTO: 5.5 K/UL
NEUTROPHILS NFR BLD: 65.6 %
PLATELET # BLD AUTO: 179 K/UL
PMV BLD AUTO: 10.5 FL
POTASSIUM SERPL-SCNC: 3.4 MMOL/L
PROTHROMBIN TIME: 20.5 SEC
RBC # BLD AUTO: 3.84 M/UL
SODIUM SERPL-SCNC: 137 MMOL/L
VANCOMYCIN TROUGH SERPL-MCNC: 14.4 UG/ML
WBC # BLD AUTO: 8.35 K/UL

## 2017-02-03 PROCEDURE — 94761 N-INVAS EAR/PLS OXIMETRY MLT: CPT

## 2017-02-03 PROCEDURE — 99233 SBSQ HOSP IP/OBS HIGH 50: CPT | Mod: ,,, | Performed by: INTERNAL MEDICINE

## 2017-02-03 PROCEDURE — 94640 AIRWAY INHALATION TREATMENT: CPT

## 2017-02-03 PROCEDURE — 25000242 PHARM REV CODE 250 ALT 637 W/ HCPCS: Performed by: INTERNAL MEDICINE

## 2017-02-03 PROCEDURE — 97110 THERAPEUTIC EXERCISES: CPT

## 2017-02-03 PROCEDURE — 97116 GAIT TRAINING THERAPY: CPT

## 2017-02-03 PROCEDURE — 63600175 PHARM REV CODE 636 W HCPCS: Performed by: FAMILY MEDICINE

## 2017-02-03 PROCEDURE — 85025 COMPLETE CBC W/AUTO DIFF WBC: CPT

## 2017-02-03 PROCEDURE — 63600175 PHARM REV CODE 636 W HCPCS: Performed by: INTERNAL MEDICINE

## 2017-02-03 PROCEDURE — 97535 SELF CARE MNGMENT TRAINING: CPT

## 2017-02-03 PROCEDURE — 25000003 PHARM REV CODE 250: Performed by: FAMILY MEDICINE

## 2017-02-03 PROCEDURE — 36415 COLL VENOUS BLD VENIPUNCTURE: CPT

## 2017-02-03 PROCEDURE — 27000339 *HC DAILY SUPPLY KIT

## 2017-02-03 PROCEDURE — 97530 THERAPEUTIC ACTIVITIES: CPT

## 2017-02-03 PROCEDURE — 11000001 HC ACUTE MED/SURG PRIVATE ROOM

## 2017-02-03 PROCEDURE — 25000003 PHARM REV CODE 250: Performed by: NURSE PRACTITIONER

## 2017-02-03 PROCEDURE — 99233 SBSQ HOSP IP/OBS HIGH 50: CPT | Mod: ,,, | Performed by: PSYCHIATRY & NEUROLOGY

## 2017-02-03 PROCEDURE — 25000003 PHARM REV CODE 250: Performed by: SURGERY

## 2017-02-03 PROCEDURE — 85610 PROTHROMBIN TIME: CPT

## 2017-02-03 PROCEDURE — 25000003 PHARM REV CODE 250: Performed by: INTERNAL MEDICINE

## 2017-02-03 PROCEDURE — 27000221 HC OXYGEN, UP TO 24 HOURS

## 2017-02-03 PROCEDURE — 80202 ASSAY OF VANCOMYCIN: CPT

## 2017-02-03 PROCEDURE — 80048 BASIC METABOLIC PNL TOTAL CA: CPT

## 2017-02-03 RX ORDER — HYDROCORTISONE 25 MG/G
CREAM TOPICAL 2 TIMES DAILY
Status: DISCONTINUED | OUTPATIENT
Start: 2017-02-03 | End: 2017-02-05 | Stop reason: HOSPADM

## 2017-02-03 RX ORDER — LEVOFLOXACIN 5 MG/ML
750 INJECTION, SOLUTION INTRAVENOUS
Status: DISCONTINUED | OUTPATIENT
Start: 2017-02-03 | End: 2017-02-05 | Stop reason: HOSPADM

## 2017-02-03 RX ORDER — POTASSIUM CHLORIDE 20 MEQ/1
40 TABLET, EXTENDED RELEASE ORAL ONCE
Status: COMPLETED | OUTPATIENT
Start: 2017-02-03 | End: 2017-02-03

## 2017-02-03 RX ADMIN — MEROPENEM: 1 INJECTION, POWDER, FOR SOLUTION INTRAVENOUS at 01:02

## 2017-02-03 RX ADMIN — MEROPENEM: 1 INJECTION, POWDER, FOR SOLUTION INTRAVENOUS at 08:02

## 2017-02-03 RX ADMIN — ATORVASTATIN CALCIUM 40 MG: 40 TABLET, FILM COATED ORAL at 08:02

## 2017-02-03 RX ADMIN — MEROPENEM: 1 INJECTION, POWDER, FOR SOLUTION INTRAVENOUS at 05:02

## 2017-02-03 RX ADMIN — IPRATROPIUM BROMIDE AND ALBUTEROL SULFATE 3 ML: .5; 3 SOLUTION RESPIRATORY (INHALATION) at 06:02

## 2017-02-03 RX ADMIN — POTASSIUM CHLORIDE 40 MEQ: 1500 TABLET, EXTENDED RELEASE ORAL at 10:02

## 2017-02-03 RX ADMIN — IPRATROPIUM BROMIDE AND ALBUTEROL SULFATE 3 ML: .5; 3 SOLUTION RESPIRATORY (INHALATION) at 07:02

## 2017-02-03 RX ADMIN — IPRATROPIUM BROMIDE AND ALBUTEROL SULFATE 3 ML: .5; 3 SOLUTION RESPIRATORY (INHALATION) at 12:02

## 2017-02-03 RX ADMIN — IPRATROPIUM BROMIDE AND ALBUTEROL SULFATE 3 ML: .5; 3 SOLUTION RESPIRATORY (INHALATION) at 01:02

## 2017-02-03 RX ADMIN — VANCOMYCIN HYDROCHLORIDE 1000 MG: 1 INJECTION, POWDER, LYOPHILIZED, FOR SOLUTION INTRAVENOUS at 06:02

## 2017-02-03 RX ADMIN — VANCOMYCIN HYDROCHLORIDE 1000 MG: 1 INJECTION, POWDER, LYOPHILIZED, FOR SOLUTION INTRAVENOUS at 05:02

## 2017-02-03 RX ADMIN — SOTALOL HYDROCHLORIDE 80 MG: 80 TABLET ORAL at 09:02

## 2017-02-03 RX ADMIN — HYDROCHLOROTHIAZIDE 25 MG: 25 TABLET ORAL at 08:02

## 2017-02-03 RX ADMIN — HYDROCORTISONE 2.5%: 25 CREAM TOPICAL at 09:02

## 2017-02-03 RX ADMIN — PANTOPRAZOLE SODIUM 40 MG: 40 TABLET, DELAYED RELEASE ORAL at 08:02

## 2017-02-03 RX ADMIN — SOTALOL HYDROCHLORIDE 80 MG: 80 TABLET ORAL at 08:02

## 2017-02-03 RX ADMIN — LEVOFLOXACIN 750 MG: 5 INJECTION, SOLUTION INTRAVENOUS at 12:02

## 2017-02-03 RX ADMIN — WARFARIN SODIUM 1 MG: 1 TABLET ORAL at 05:02

## 2017-02-03 NOTE — ASSESSMENT & PLAN NOTE
WBC 21K on admission>>14>>11>>9  Source is unclear, likely bronchitis  UA minimal, CXR is negative on admission  Blood cx NGTD  Started azithro and rocephin as wheezing and having some SOB on admission  CXR Tuesday with small effusion; d/c ivf  Tmax 2/1/17102.7-Check flu (neg)  Antibiotics changed to Meropenem and Vanc on 2/1/17pm - WBC ct improving. Will watch fever curve. Now with concern for PNA based on CXR findings  De-escalate to levaquini today (normal renal function but will likely influence his INR) and see how he does, if fevers or rising WBC will broaden coverage again

## 2017-02-03 NOTE — NURSING
Report received. Patient resting in bed. Addressed questions and concerns. Call bell within reach. Patient instructed to call with needs.

## 2017-02-03 NOTE — PLAN OF CARE
02/03/17 1444   Discharge Reassessment   Assessment Type Discharge Planning Reassessment   Can the patient answer the patient profile reliably? Yes, cognitively intact   How does the patient rate their overall health at the present time? Good   Describe the patient's ability to walk at the present time. Walks with the help of equipment   How often would a person be available to care for the patient? Whenever needed   Number of comorbid conditions (as recorded on the chart) Two   During the past month, has the patient often been bothered by feeling down, depressed or hopeless? No   During the past month, has the patient often been bothered by little interest or pleasure in doing things? No   Discharge plan remains the same: No   Provided patient/caregiver education on the expected discharge date and the discharge plan Yes   Discharge Plan A Home Health;Home   Discharge Plan B Home Health;Home   Change in patient condition or support system No   Patient choice form signed by patient/caregiver Yes   Explained to the the patient/caregiver why the discharge planned changed: Yes   Involved the patient/caregiver in establishing a new discharge plan: Yes

## 2017-02-03 NOTE — SUBJECTIVE & OBJECTIVE
Interval History: no acute events overnight    Review of Systems   Constitutional: Negative for activity change, fatigue, fever and unexpected weight change.   HENT: Negative for congestion, ear pain, hearing loss, rhinorrhea and sore throat.    Eyes: Negative for pain, redness and visual disturbance.   Respiratory: Negative for cough, shortness of breath and wheezing.    Cardiovascular: Negative for chest pain, palpitations and leg swelling.   Gastrointestinal: Negative for abdominal pain, constipation, diarrhea, nausea and vomiting.   Genitourinary: Negative for decreased urine volume, dysuria, frequency and urgency.   Musculoskeletal: Negative for back pain, joint swelling and neck pain.   Skin: Negative for color change, rash and wound.   Neurological: Negative for dizziness, tremors, weakness, light-headedness and headaches.     Objective:     Vital Signs (Most Recent):  Temp: 98.5 °F (36.9 °C) (02/03/17 0400)  Pulse: 88 (02/03/17 0729)  Resp: (!) 22 (02/03/17 0729)  BP: 106/78 (02/03/17 0400)  SpO2: 99 % (02/03/17 0729) Vital Signs (24h Range):  Temp:  [97.1 °F (36.2 °C)-101.4 °F (38.6 °C)] 98.5 °F (36.9 °C)  Pulse:  [] 88  Resp:  [18-22] 22  SpO2:  [93 %-99 %] 99 %  BP: ()/(63-89) 106/78     Weight: 72.9 kg (160 lb 11.5 oz)  Body mass index is 25.17 kg/(m^2).    Intake/Output Summary (Last 24 hours) at 02/03/17 0848  Last data filed at 02/03/17 0233   Gross per 24 hour   Intake              410 ml   Output              400 ml   Net               10 ml      Physical Exam   Constitutional: He is oriented to person, place, and time. He appears well-developed and well-nourished. No distress.   HENT:   Head: Normocephalic and atraumatic.   Right Ear: External ear normal.   Left Ear: External ear normal.   Eyes: Conjunctivae and EOM are normal. Pupils are equal, round, and reactive to light. Right eye exhibits no discharge. Left eye exhibits no discharge.   Neck: Neck supple. No tracheal deviation  present.   Cardiovascular: Normal rate and regular rhythm.  Exam reveals no gallop and no friction rub.    No murmur heard.  Pulmonary/Chest: Effort normal and breath sounds normal. No respiratory distress. He has no wheezes. He has no rales.   Abdominal: Soft. Bowel sounds are normal. He exhibits no distension. There is no tenderness.   Musculoskeletal: He exhibits no edema.   Neurological: He is alert and oriented to person, place, and time. No cranial nerve deficit.   Skin: Skin is warm and dry.   Bruising and swelling of left arm, decreasing   Psychiatric: He has a normal mood and affect. His behavior is normal.   Vitals reviewed.      Significant Labs:   CBC:   Recent Labs  Lab 02/02/17  0730 02/03/17  0446   WBC 9.97 8.35   HGB 13.3* 12.5*   HCT 38.0* 35.5*    179     CMP:   Recent Labs  Lab 02/02/17 0730 02/03/17  0446   * 137   K 3.9 3.4*    102   CO2 23 25    109   BUN 19 19   CREATININE 0.8 0.8   CALCIUM 9.2 8.9   ANIONGAP 11 10   EGFRNONAA >60 >60     Coagulation:   Recent Labs  Lab 02/03/17  0446   INR 2.1*     All pertinent labs within the past 24 hours have been reviewed.    Significant Imaging: I have reviewed all pertinent imaging results/findings within the past 24 hours.

## 2017-02-03 NOTE — PT/OT/SLP PROGRESS
Occupational Therapy  Treatment    Hernesto Sofia   MRN: [id number]   Admitting Diagnosis: Atrial fibrillation with RVR    OT Date of Treatment: 17   OT Start Time: 330  OT Stop Time: 345  OT Total Time (min): 15 min    Billable Minutes:  Self Care/Home Management 15 min    General Precautions: Standard, fall  Orthopedic Precautions: N/A  Braces: N/A    Do you have any cultural, spiritual, Jew conflicts, given your current situation?:  (none verbalized)    Subjective:  Communicated with nsg prior to session.      Pain Ratin/10              Pain Rating Post-Intervention: 0/10    Objective:  Patient found with: oxygen     Functional Mobility:  Bed Mobility:       Transfers:   Sit <> Stand Assistance: Modified Independent  Sit <> Stand Assistive Device: Rolling Walker    Functional Ambulation: Pt ambulated 10' to restroom with Mod (A) for balance and walker placement.    Activities of Daily Living:    Grooming Position: Standing  Grooming Level of Assistance: Minimum assistance  Toileting Where Assessed: Toilet  Toileting Level of Assistance: Moderate assistance            Balance:   Static Sit: GOOD: Takes MODERATE challenges from all directions  Dynamic Sit: 0: N/A  Static Stand: FAIR+: Takes MINIMAL challenges from all directions  Dynamic stand: FAIR: Needs CONTACT GUARD during gait      AM-PAC 6 CLICK ADL   How much help from another person does this patient currently need?   1 = Unable, Total/Dependent Assistance  2 = A lot, Maximum/Moderate Assistance  3 = A little, Minimum/Contact Guard/Supervision  4 = None, Modified [place]/Independent          AM-PAC Raw Score CMS G-Code Modifier Level of Impairment Assistance   6 % Total / Unable   7 - 9 CM 80 - 100% Maximal Assist   10 - 14 CL 60 - 80% Moderate Assist   15 - 19 CK 40 - 60% Moderate Assist   20 - 22 CJ 20 - 40% Minimal Assist   23 CI 1-20% SBA / CGA   24 CH 0% Independent/ Mod I     Patient left up in chair with all lines  intact, call button in reach and nsg notified    ASSESSMENT:  Hernesto Sofia is a 89 y.o. male with a medical diagnosis of Atrial fibrillation with RVR and presents with decrease cognition and decrease safety in functional mobility.  Pt ambulates with RW, but Pt requires VC's to keep walker close to him and he ambulate with flexed trunk and rounded shoulders.  Pt completed toielting with Min (A) due to (A) required for brief management.  Pt also require Min (A) for balance with grooming.  Pt will continue to benefit from skilled OT.    Rehab identified problem list/impairments: Rehab identified problem list/impairments: weakness, impaired endurance, impaired self care skills    Rehab potential is fair.    Activity tolerance: Good    Discharge recommendations: Discharge Facility/Level Of Care Needs: home with home health     Barriers to discharge:      Equipment recommendations: none     GOALS:   Occupational Therapy Goals        Problem: Occupational Therapy Goal    Goal Priority Disciplines Outcome Interventions   Occupational Therapy Goal     OT, PT/OT     Description:  Patient will increase functional independence with ADLs by performing:    UE Dressing with Set-up Assistance.  LE Dressing with Minimal Assistance.  Grooming to be assessed   Toileting from bedside commode with Supervision for hygiene and clothing management.                 Plan:  Patient to be seen 3 x/week, 5 x/week to address the above listed problems via self-care/home management, therapeutic activities, therapeutic exercises  Plan of Care expires:    Plan of Care reviewed with: patient         Corin Andria, OT  02/03/2017

## 2017-02-03 NOTE — PROGRESS NOTES
"[facility] Hospital Medicine  Progress Note    Patient Name: Hernesto Sofia  MRN: [id number]  Patient Class: IP- Inpatient   Admission Date: 1/30/2017  Length of Stay: 3 days  Attending Physician: Roxanne Pandey MD  Primary Care Provider: Hasmukh Pederson MD        Subjective:     Principal Problem:Atrial fibrillation with RVR    HPI:  Patient presented to ER with confusion and shortness of breath. Was brought in by his daughter who is not at the bedside this evening. Patient reports he was having an "out of body experience" and that's why he was brought here. Doesn't give much more detail than this. He denies SOB, chest pains, palpitations, LE edema, orthopnea. Denies dysuria, hematuria but does report frequency and incomplete bladder emptying. He denies abdominal pain, n/v/d/c. He is alert and oriented this evening but is a poor historian.     Hospital Course:  Was found to bed in a[facility] with RVR with rate low 100s. CXR was normal, no consolidation or pulmonary edema. CIS and pulmonary consulted to assist with management. CT head was negative, neurology consulted in ED and planned for MRI brain but he has pacemaker. Was to start on dilt gtt but HR low 100s and BP on the low side so managing with IV metoprolol.      Since metoprolol his HR is around 66-98.     He is on Zithromax and ceftriaxone x 3 days on Wed. Had low grade fever Tuesday night up to 100.6. Flu test added, neg. On admit, mild uti noted. Spiked 102.7 yesterday after CT dye extravasation in his arm. A/b changed to Meropenem and Vanc. Repeat u/a and CXR ordered today. CXR reviewed, new left retrocardiac opacity noted. Patient says he is feeling great.    No fevers, leukocytosis since adding antibx. Denies SOB, chest pain, LE edema.      Interval History: no acute events overnight    Review of Systems   Constitutional: Negative for activity change, fatigue, fever and unexpected weight change.   HENT: Negative for congestion, " ear pain, hearing loss, rhinorrhea and sore throat.    Eyes: Negative for pain, redness and visual disturbance.   Respiratory: Negative for cough, shortness of breath and wheezing.    Cardiovascular: Negative for chest pain, palpitations and leg swelling.   Gastrointestinal: Negative for abdominal pain, constipation, diarrhea, nausea and vomiting.   Genitourinary: Negative for decreased urine volume, dysuria, frequency and urgency.   Musculoskeletal: Negative for back pain, joint swelling and neck pain.   Skin: Negative for color change, rash and wound.   Neurological: Negative for dizziness, tremors, weakness, light-headedness and headaches.     Objective:     Vital Signs (Most Recent):  Temp: 98.5 °F (36.9 °C) (02/03/17 0400)  Pulse: 88 (02/03/17 0729)  Resp: (!) 22 (02/03/17 0729)  BP: 106/78 (02/03/17 0400)  SpO2: 99 % (02/03/17 0729) Vital Signs (24h Range):  Temp:  [97.1 °F (36.2 °C)-101.4 °F (38.6 °C)] 98.5 °F (36.9 °C)  Pulse:  [] 88  Resp:  [18-22] 22  SpO2:  [93 %-99 %] 99 %  BP: ()/(63-89) 106/78     Weight: 72.9 kg (160 lb 11.5 oz)  Body mass index is 25.17 kg/(m^2).    Intake/Output Summary (Last 24 hours) at 02/03/17 0849  Last data filed at 02/03/17 0233   Gross per 24 hour   Intake              410 ml   Output              400 ml   Net               10 ml      Physical Exam   Constitutional: He is oriented to person, place, and time. He appears well-developed and well-nourished. No distress.   HENT:   Head: Normocephalic and atraumatic.   Right Ear: External ear normal.   Left Ear: External ear normal.   Eyes: Conjunctivae and EOM are normal. Pupils are equal, round, and reactive to light. Right eye exhibits no discharge. Left eye exhibits no discharge.   Neck: Neck supple. No tracheal deviation present.   Cardiovascular: Normal rate and regular rhythm.  Exam reveals no gallop and no friction rub.    No murmur heard.  Pulmonary/Chest: Effort normal and breath sounds normal. No respiratory  distress. He has no wheezes. He has no rales.   Abdominal: Soft. Bowel sounds are normal. He exhibits no distension. There is no tenderness.   Musculoskeletal: He exhibits no edema.   Neurological: He is alert and oriented to person, place, and time. No cranial nerve deficit.   Skin: Skin is warm and dry.   Bruising and swelling of left arm, decreasing   Psychiatric: He has a normal mood and affect. His behavior is normal.   Vitals reviewed.      Significant Labs:   CBC:   Recent Labs  Lab 02/02/17 0730 02/03/17  0446   WBC 9.97 8.35   HGB 13.3* 12.5*   HCT 38.0* 35.5*    179     CMP:   Recent Labs  Lab 02/02/17 0730 02/03/17 0446   * 137   K 3.9 3.4*    102   CO2 23 25    109   BUN 19 19   CREATININE 0.8 0.8   CALCIUM 9.2 8.9   ANIONGAP 11 10   EGFRNONAA >60 >60     Coagulation:   Recent Labs  Lab 02/03/17 0446   INR 2.1*     All pertinent labs within the past 24 hours have been reviewed.    Significant Imaging: I have reviewed all pertinent imaging results/findings within the past 24 hours.    Assessment/Plan:      * Atrial fibrillation with RVR  Was on coumadin as outpatient, INR 3.3 wed-decreased dose to 1 mg q hs;   INR 2.1  Daily INR. Home dose 5mg Mon, Wed, Fri and 2.5mg Tues, Thurs, Sat and Sun; watch closely on a/b  On sotalol, will continue-now rate controlled.   Was not started on dilt gtt due to BP  IV metoprolol PRN for HR > 110  TTE ordered-reviewed; AS mod  CIS following      Leukocytosis  WBC 21K on admission>>14>>11>>9  Source is unclear, likely bronchitis  UA minimal, CXR is negative on admission  Blood cx NGTD  Started azithro and rocephin as wheezing and having some SOB on admission  CXR Tuesday with small effusion; d/c ivf  Tmax 2/1/17102.7-Check flu (neg)  Antibiotics changed to Meropenem and Vanc on 2/1/17pm - WBC ct improving. Will watch fever curve. Now with concern for PNA based on CXR findings  De-escalate to levaquini today (normal renal function but will  "likely influence his INR) and see how he does, if fevers or rising WBC will broaden coverage again      HLD (hyperlipidemia)  Continue home atorvastatin      HTN (hypertension)  On HCTZ prior to admission, restarted      Chronic right-sided headaches  This seems to be a chronic issue for him  CT head negative  ESR OK--less likely temporal arteritis, not tender on exam  Can't do MRI due to PPM  Neurology was consulted in ED-Trigeminal neuralgia vs cervical radicular pain in differential; ? Start low dose gabapentin for proph  Tylenol PRN for pain for now  EEG pending    Acute confusional state  Was brought to ER for this reason per ER records  Family not at bedside to give history  Patient reports "out of body experience" but can't elaborate further  Now alert and oriented to person, place, time  Neurology consulted-? Related to infection; now improved  ?? metabolic, infection vs cardiac with RVR    EEG pending      Cervical radicular pain  Trigeminal neuralgia vs cervical radicular pain in differential  ? Start low dose gabapentin for proph  CTA head and neck was attempted on Wed, IV infiltrated with contrast, will re-attempt today  Dr. Arizmendi following      Aortic stenosis  Cards following  Not retaining fluid      Pneumonia of left upper lobe due to infectious organism  Continue current plan and treat with merem and vanc.  Monitoring fever trend and de-escalate to levaquin today  If WBC, fever trending up with broaden coverage again       Hypokalemia  Replace with 40 PO today    VTE Risk Mitigation         Ordered     Medium Risk of VTE  Once      01/30/17 1514     Place sequential compression device  Until discontinued      01/30/17 1514          Roxanne Pandey MD  Department of Hospital Medicine   [facility]  "

## 2017-02-03 NOTE — PLAN OF CARE
Problem: Patient Care Overview  Goal: Plan of Care Review  Outcome: Ongoing (interventions implemented as appropriate)  Fall precautions maintained. Patient remains free from falls/injuries. Patient ambulated in hallway with physical therapy and spent most of the day up in the chair. Sitters/family at bedside throughout day. IV ABX administered. Unable to collect sputum since patient cough in nonproductive. Left arm slightly swollen and red, but improving. Patient hoping to be discharged on 2/4/17; MD thinks this may be possible. Plan of care reviewed with patient and family; both agree with plan of care.

## 2017-02-03 NOTE — PT/OT/SLP PROGRESS
Physical Therapy  Treatment    Hernesto Sofia   MRN: [id number]   Admitting Diagnosis: Atrial fibrillation with RVR    PT Received On: 17  PT Start Time: 0950     PT Stop Time: 1015    PT Total Time (min): 25 min       Billable Minutes:  Gait [id number] minutes and Therapeutic Activity 10 minutes    Treatment Type: Treatment  PT/PTA: PT             General Precautions: Standard, fall  Orthopedic Precautions: N/A   Braces:           Subjective:  Communicated with patient prior to session.    Pain Ratin/10                   Objective:   Patient found with: oxygen    Functional Mobility:  Bed Mobility:   Rolling/Turning to Left: Contact guard assistance  Rolling/Turning Right: Contact guard assistance  Scooting/Bridging: Contact Guard Assistance  Supine to Sit: Contact Guard Assistance  Sit to Supine: Contact Guard Assistance    Transfers:  Sit <> Stand Assistance: Contact Guard Assistance  Sit <> Stand Assistive Device: Rolling Walker  Bed <> Chair Technique: Stand Pivot  Bed <> Chair Assistance: Contact Guard Assistance  Bed <> Chair Assistive Device: Rolling Walker    Gait:   Gait Distance: 100 feet  Assistance 1: Contact Guard Assistance  Gait Assistive Device: Rolling walker  Gait Pattern: swing-through gait  Gait Deviation(s): decreased velocity of limb motion, decreased toe-to-floor clearance    Balance:   Static Sit: GOOD-: Takes MODERATE challenges from all directions but inconsistently  Dynamic Sit: GOOD-: Maintains balance through MODERATE excursions of active trunk movement,     Static Stand: GOOD-: Takes MODERATE challenges from all directions inconsistently  Dynamic stand: FAIR+: Needs CLOSE SUPERVISION during gait and is able to right self with minor LOB     AM-PAC 6 CLICK MOBILITY  How much help from another person does this patient currently need?   1 = Unable, Total/Dependent Assistance  2 = A lot, Maximum/Moderate Assistance  3 = A little, Minimum/Contact Guard/Supervision  4 = None,  Modified [place]/Independent    Turning over in bed (including adjusting bedclothes, sheets and blankets)?: 3  Sitting down on and standing up from a chair with arms (e.g., wheelchair, bedside commode, etc.): 3  Moving from lying on back to sitting on the side of the bed?: 3  Moving to and from a bed to a chair (including a wheelchair)?: 3  Need to walk in hospital room?: 3  Climbing 3-5 steps with a railing?: 1  Total Score: 16    AM-PAC Raw Score CMS G-Code Modifier Level of Impairment Assistance   6 % Total / Unable   7 - 9 CM 80 - 100% Maximal Assist   10 - 14 CL 60 - 80% Moderate Assist   15 - 19 CK 40 - 60% Moderate Assist   20 - 22 CJ 20 - 40% Minimal Assist   23 CI 1-20% SBA / CGA   24 CH 0% Independent/ Mod I     Patient left up in chair with all lines intact, call button in reach, NSG notified and caregiver present.    Assessment:  Hernesto Sofia is a 89 y.o. male with a medical diagnosis of Atrial fibrillation with RVR and presents with good progression with POC goals with improving ambulation distance.    Rehab identified problem list/impairments: Rehab identified problem list/impairments: weakness, impaired endurance, impaired self care skills, impaired functional mobilty, gait instability, impaired balance, decreased lower extremity function, decreased upper extremity function, decreased safety awareness    Rehab potential is good.    Activity tolerance: Good    Discharge recommendations: Discharge Facility/Level Of Care Needs: home with home health, home health PT     Barriers to discharge: Barriers to Discharge: None    Equipment recommendations: Equipment Needed After Discharge: none     GOALS:   Physical Therapy Goals        Problem: Physical Therapy Goal    Goal Priority Disciplines Outcome Goal Variances Interventions   Physical Therapy Goal     PT/OT, PT      Description:  Goals to be met by: 2/10/2017     Patient will increase functional independence with mobility by  performin. Supine to sit with Stand-by Assistance  2. Sit to supine with Stand-by Assistance  3. Rolling to Left and Right with Stand-by Assistance.  4. Sit to stand transfer with Stand-by Assistance  5. Bed to chair transfer with Stand-by Assistance using Rolling Walker  6. Gait  x 150 feet with Stand-by Assistance using Rolling Walker.                 PLAN:    Patient to be seen  (1-2x/day Monday-Friday; PRN Weekends/Holidays)  to address the above listed problems via gait training, therapeutic activities, therapeutic exercises  Plan of Care expires:  (upon D/C from facility)  Plan of Care reviewed with: patient, caregiver         Emerald Gabriela, PT  2017

## 2017-02-03 NOTE — PT/OT/SLP PROGRESS
Physical Therapy  Treatment    Hernesto Sofia   MRN: [id number]   Admitting Diagnosis: Atrial fibrillation with RVR    PT Received On: 17  PT Start Time: 1320     PT Stop Time: 1345    PT Total Time (min): 25 min       Billable Minutes:  Gait [id number] minutes and Therapeutic Activity 10 minutes    Treatment Type: Treatment  PT/PTA: PT             General Precautions: Standard, fall  Orthopedic Precautions: N/A   Braces:           Subjective:  Communicated with patient prior to session.    Pain Ratin/10                   Objective:   Patient found with: oxygen    Functional Mobility:  Bed Mobility:   Rolling/Turning to Left: Contact guard assistance  Rolling/Turning Right: Contact guard assistance  Scooting/Bridging: Contact Guard Assistance  Supine to Sit: Contact Guard Assistance  Sit to Supine: Contact Guard Assistance    Transfers:  Sit <> Stand Assistance: Contact Guard Assistance  Sit <> Stand Assistive Device: Rolling Walker  Bed <> Chair Technique: Stand Pivot  Bed <> Chair Assistance: Contact Guard Assistance  Bed <> Chair Assistive Device: Rolling Walker  Toilet Transfer Assistance: Contact Guard Assistance  Toilet Transfer Assistive Device: Rolling Walker    Gait:   Gait Distance: 100 feet  Assistance 1: Contact Guard Assistance  Gait Assistive Device: Rolling walker  Gait Pattern: swing-through gait  Gait Deviation(s): decreased velocity of limb motion, decreased step length, decreased toe-to-floor clearance, forward lean    Balance:   Static Sit: GOOD: Takes MODERATE challenges from all directions  Dynamic Sit: GOOD: Maintains balance through MODERATE excursions of active trunk movement  Static Stand: FAIR+: Takes MINIMAL challenges from all directions  Dynamic stand: FAIR: Needs CONTACT GUARD during gait     AM-PAC 6 CLICK MOBILITY  How much help from another person does this patient currently need?   1 = Unable, Total/Dependent Assistance  2 = A lot, Maximum/Moderate Assistance  3 = A  little, Minimum/Contact Guard/Supervision  4 = None, Modified [place]/Independent    Turning over in bed (including adjusting bedclothes, sheets and blankets)?: 3  Sitting down on and standing up from a chair with arms (e.g., wheelchair, bedside commode, etc.): 3  Moving from lying on back to sitting on the side of the bed?: 3  Moving to and from a bed to a chair (including a wheelchair)?: 3  Need to walk in hospital room?: 3  Climbing 3-5 steps with a railing?: 1  Total Score: 16    AM-PAC Raw Score CMS G-Code Modifier Level of Impairment Assistance   6 % Total / Unable   7 - 9 CM 80 - 100% Maximal Assist   10 - 14 CL 60 - 80% Moderate Assist   15 - 19 CK 40 - 60% Moderate Assist   20 - 22 CJ 20 - 40% Minimal Assist   23 CI 1-20% SBA / CGA   24 CH 0% Independent/ Mod I     Patient left on toilet with all lines intact, call button in reach, NSG notified and caregiver present.    Assessment:  Hernesto Sofia is a 89 y.o. male with a medical diagnosis of Atrial fibrillation with RVR and presents with good progression with POC goal with decreased fatigue with mobility.    Rehab identified problem list/impairments: Rehab identified problem list/impairments: weakness, impaired endurance, impaired self care skills, impaired functional mobilty, gait instability, impaired balance, decreased lower extremity function, decreased safety awareness    Rehab potential is good.    Activity tolerance: Good    Discharge recommendations: Discharge Facility/Level Of Care Needs: home with home health, home health PT     Barriers to discharge: Barriers to Discharge: None    Equipment recommendations: Equipment Needed After Discharge: none     GOALS:   Physical Therapy Goals        Problem: Physical Therapy Goal    Goal Priority Disciplines Outcome Goal Variances Interventions   Physical Therapy Goal     PT/OT, PT      Description:  Goals to be met by: 2/10/2017     Patient will increase functional independence with mobility  by performin. Supine to sit with Stand-by Assistance  2. Sit to supine with Stand-by Assistance  3. Rolling to Left and Right with Stand-by Assistance.  4. Sit to stand transfer with Stand-by Assistance  5. Bed to chair transfer with Stand-by Assistance using Rolling Walker  6. Gait  x 150 feet with Stand-by Assistance using Rolling Walker.                 PLAN:    Patient to be seen  (1-2x/day Monday-Friday; PRN Weekends/Holidays)  to address the above listed problems via gait training, therapeutic activities, therapeutic exercises  Plan of Care expires:  (upon D/C from facility)  Plan of Care reviewed with: patient, caregiver         Emerald Gabriela, PT  2017

## 2017-02-03 NOTE — PLAN OF CARE
Problem: Patient Care Overview  Goal: Plan of Care Review  Outcome: Ongoing (interventions implemented as appropriate)  Nebulizer treatment given via mask. Patient tolerated treatment well. Informed him of the next scheduled treatment. Jonna Zuñiga, RRT, RRT-SDS

## 2017-02-03 NOTE — PROGRESS NOTES
[facility]  Neurology  Progress Note      Patient Name: Hernesto Sofia  MRN: [id number]  Admission Date: 1/30/2017  Code Status: Full Code   Attending Provider: Roxanne Pandey MD  Primary Care Physician: Hasmukh Pederson MD   Principal Problem:Atrial fibrillation with RVR          Subjective:       Interval History: Patient is no longer complaining of head or neck pain  Now being treated for pneumonia  States he feels great  Sitter, Ritu, states patient is back to his baseline mental status             Review of Systems   Constitutional: Negative for fatigue.   HENT: Negative for drooling.   Eyes: Negative for photophobia.   Respiratory: Negative for wheezing.   Cardiovascular: Negative for chest pain.   Gastrointestinal: Negative for blood in stool.   Genitourinary: Negative for hematuria or dysuria  Musculoskeletal: Negative for neck pain.   Neurological: Negative for tremors.   Psychiatric/Behavioral: Negative for hallucinations or depression       Objective:       Vitals:    02/03/17 0600 02/03/17 0729 02/03/17 0840 02/03/17 1130   BP:   107/72 95/66   BP Location:   Right arm Right arm   Patient Position:   Lying Lying   BP Method:   Automatic Automatic   Pulse: 86 88 78 78   Resp:  (!) 22 20 20   Temp:   96.8 °F (36 °C) 99.4 °F (37.4 °C)   TempSrc:   Oral Oral   SpO2:  99%  (!) 94%   Weight:       Height:                  Tmax 102.7 noted     Weight: 72.9 kg (160 lb 11.5 oz)  Body mass index is 25.17 kg/(m^2).      Physical Exam          Gen Appearance, well developed/nourished in no apparent distress  CV: 2+ distal pulses with no edema or swelling  Neuro:  MS: Awake, alert, oriented to place , person,but not to time, and well intact to situation. Sustains attention. Recent recall is fairly well intact/remote memory intact, Language is full to spontaneous speech/comprehension. Fund of Knowledge is likely less than expected  CN: Optic discs are flat with normal vasculature, PERRL,  Extraoccular movements and visual fields are full. Normal facial sensation and strength, Hearing symmetric, Tongue and Palate are midline and strong. Shoulder Shrug symmetric and strong.  Motor: Normal bulk, tone, no abnormal movements. 5/5 strength bilateral upper/lower extremities with 1+ reflexes and no clonus  Sensory: symmetric to temp, and vibration Romberg not done  Cerebellar: Finger-nose, Rapid alternating movements intact  Gait: Not done. Patient usually ambulates with a walker  Left arm no longer swollen at Antebrachial fossa from IV infiltration- some bruising          Significant Labs: CBC, CMP reviewed      Significant Imaging: Xray C spine: degenerative changes    EEG unremarkable      Assessment and Plan:       * Atrial fibrillation with RVR  Improved  On anticoagulation with coumadin for CVA prevention. No signs of CVA on exam. Can't have MRI due to pacemaker in situ      Leukocytosis  Resolved and mental status seems improved with infectious issues.    Change in antibiotic coverage noted      Acute confusional state    -History of AMS with prior acute metabolic or infectious illness in this patient  -No need for further work up as this is resolved.   -Now being treated for pneumonia, clinically patient is improved      Cervical radicular pain  -pain is resolved now. Cervical radicular vs neuralgia (?TN)  -Could not tolerate CTA due to IV infiltration. Can hold on further testing  -Holding on gabapentin as symptoms are resolved. Can re-consider imaging and further evaluation if recurrent symptoms       Anticipating d/c soon.  Notify me if head/neck pain recurs. See me PRN. Otherwise will see patient Monday  Garrison Arizmendi MD  Neurology  [facility]

## 2017-02-03 NOTE — PROGRESS NOTES
"[facility] Hospital Medicine  Progress Note    Patient Name: Hernesto Sofia  MRN: [id number]  Patient Class: IP- Inpatient   Admission Date: 1/30/2017  Length of Stay: 2 days  Attending Physician: Roxanne Pandey MD  Primary Care Provider: Hasmukh Pederson MD        Subjective:     Principal Problem:Atrial fibrillation with RVR    HPI:  Patient presented to ER with confusion and shortness of breath. Was brought in by his daughter who is not at the bedside this evening. Patient reports he was having an "out of body experience" and that's why he was brought here. Doesn't give much more detail than this. He denies SOB, chest pains, palpitations, LE edema, orthopnea. Denies dysuria, hematuria but does report frequency and incomplete bladder emptying. He denies abdominal pain, n/v/d/c. He is alert and oriented this evening but is a poor historian.     Hospital Course:  Was found to bed in a[facility] with RVR with rate low 100s. CXR was normal, no consolidation or pulmonary edema. CIS and pulmonary consulted to assist with management. CT head was negative, neurology consulted in ED and planned for MRI brain but he has pacemaker. Was to start on dilt gtt but HR low 100s and BP on the low side so managing with IV metoprolol.      Since metoprolol his HR is around 66-98.     He is on Zithromax and ceftriaxone x 3 days on Wed. Had low grade fever Tuesday night up to 100.6. Flu test added, neg. On admit, mild uti noted. Spiked 102.7 yesterday after CT dye extravasation in his arm. A/b changed to Meropenem and Vanc. Repeat u/a and CXR ordered today. CXR reviewed, new left retrocardiac opacity noted. Patient says he is feeling great.      Interval History:     Review of Systems   Constitutional: Positive for fever. Negative for activity change, fatigue and unexpected weight change.   HENT: Negative for congestion, ear pain, hearing loss, rhinorrhea and sore throat.    Eyes: Negative for visual disturbance. "   Respiratory: Positive for cough. Negative for shortness of breath and wheezing.    Cardiovascular: Negative for chest pain, palpitations and leg swelling.   Gastrointestinal: Negative for abdominal pain, constipation, diarrhea, nausea and vomiting.   Genitourinary: Negative for decreased urine volume, dysuria, frequency and urgency.   Musculoskeletal: Negative for back pain, joint swelling and neck pain.   Skin: Positive for color change (left arm since iv infiltrated). Negative for rash and wound.        Swelling and erythema   Neurological: Negative for dizziness, tremors, weakness, light-headedness and headaches.     Objective:     Vital Signs (Most Recent):  Temp: 99.9 °F (37.7 °C) (02/02/17 0750)  Pulse: 98 (02/02/17 0750)  Resp: (!) 22 (02/02/17 0750)  BP: 118/72 (02/02/17 0750)  SpO2: 97 % (02/02/17 0740) Vital Signs (24h Range):  Temp:  [98 °F (36.7 °C)-102.7 °F (39.3 °C)] 99.9 °F (37.7 °C)  Pulse:  [] 98  Resp:  [16-22] 22  SpO2:  [93 %-97 %] 97 %  BP: (103-132)/(68-82) 118/72     Weight: 72.9 kg (160 lb 11.5 oz)  Body mass index is 25.17 kg/(m^2).    Intake/Output Summary (Last 24 hours) at 02/02/17 0859  Last data filed at 02/02/17 0000   Gross per 24 hour   Intake             1220 ml   Output              400 ml   Net              820 ml      Physical Exam   Constitutional: He is oriented to person, place, and time. He appears well-developed and well-nourished. No distress.   Sitting up in chair with no shortness of breath or distress   HENT:   Head: Normocephalic and atraumatic.   Right Ear: External ear normal.   Left Ear: External ear normal.   Eyes: Conjunctivae and EOM are normal. Pupils are equal, round, and reactive to light. Right eye exhibits no discharge. Left eye exhibits no discharge.   Neck: Neck supple. No tracheal deviation present.   Cardiovascular: An irregularly irregular rhythm present. Exam reveals no gallop and no friction rub.    Murmur heard.  90s HR : irregulary irregular    Pulmonary/Chest: Effort normal. No respiratory distress. He has wheezes (diffuse). He has rales.   Abdominal: Soft. Bowel sounds are normal. He exhibits no distension. There is no tenderness. There is no rebound and no guarding.   Musculoskeletal: He exhibits edema (trace edema to ankles bilaterally).   Left arm with diffuse swelling, erythema and induration at site of extravasation of contrast dye   Lymphadenopathy:     He has no cervical adenopathy.   Neurological: He is alert and oriented to person, place, and time. No cranial nerve deficit.   Skin: Skin is warm and dry.   Psychiatric: He has a normal mood and affect. His behavior is normal.   Nursing note and vitals reviewed.      Significant Labs:   CBC:     Recent Labs  Lab 02/01/17  0531 02/02/17  0730   WBC 11.61 9.97   HGB 12.1* 13.3*   HCT 36.4* 38.0*    174     CMP:     Recent Labs  Lab 02/01/17  0531 02/02/17  0730   * 135*   K 4.4 3.9    101   CO2 23 23    105   BUN 25* 19   CREATININE 0.9 0.8   CALCIUM 8.8 9.2   ANIONGAP 7* 11   EGFRNONAA >60 >60       Significant Imaging:   CXR: The heart is moderately enlarged.  Calcified atheromatous disease affects the aorta.  Left-sided pacemaker devices in place.  The lungs are clear.  The skeletal structures are intact.    Repeat CXR: Chest, 1 view: Suspected left retrocardiac opacity, new from the prior examination, which could represent pleural fluid, atelectasis or pneumonia.  The right lung is clear.  The heart is moderately enlarged.  Callus but atheromatous disease affects the aorta.  Left-sided pacemaker device is in place.    CT: Age-appropriate generalized cerebral volume loss with moderate degree of patchy decreased attenuation supratentorial white matter suggestive for chronic microvascular ischemic change similarly seen on the prior.     No evidence for acute intracranial hemorrhage or definite new abnormal parenchymal attenuation. Clinical correlation and further  "evaluation as warranted.    Blood culture: no growth       Assessment/Plan:      * Atrial fibrillation with RVR  Was on coumadin as outpatient, INR 3.3 wed-decreased dose to 1 mg q hs;   INR 2.6  Daily INR. Home dose 5mg Mon, Wed, Fri and 2.5mg Tues, Thurs, Sat and Sun; watch closely on a/b  On sotalol, will continue-now rate controlled.   Was not started on dilt gtt due to BP  IV metoprolol PRN for HR > 110  TTE ordered-reviewed; AS mod  CIS following      Leukocytosis  WBC 21K on admission>>14>>11>>9  Source is unclear, likely bronchitis  UA minimal, CXR is negative  Blood cx NGTD  Started azithro and rocephin as wheezing and having some SOB now---but is this infection vs cardiac due to RVR  CXR Tuesday with small effusion; d/c ivf  Tmax yesterday 102.7-Check flu (neg)  Antibiotics changed to Meropenem and Vanc on Wed pm - WBC ct improving. Will watch fever curve. Now with concern for PNA based on CXR findings      HLD (hyperlipidemia)  Continue home atorvastatin      HTN (hypertension)  On HCTZ prior to admission, restarted      Chronic right-sided headaches  This seems to be a chronic issue for him  CT head negative  ESR OK--less likely temporal arteritis, not tender on exam  Can't do MRI due to PPM  Neurology was consulted in ED-Trigeminal neuralgia vs cervical radicular pain in differential; ? Start low dose gabapentin for proph  Tylenol PRN for pain for now  EEG pending    Acute confusional state  Was brought to ER for this reason per ER records  Family not at bedside to give history  Patient reports "out of body experience" but can't elaborate further  Now alert and oriented to person, place, time  Neurology consulted-? Related to infection; now improved  ?? metabolic, infection vs cardiac with RVR    EEG pending      Cervical radicular pain  Trigeminal neuralgia vs cervical radicular pain in differential  ? Start low dose gabapentin for proph  CTA head and neck was attempted on Wed, IV infiltrated with " contrast, will re-attempt today  Dr. Arizmendi following      Aortic stenosis  Cards following      Pneumonia of left upper lobe due to infectious organism  Continue current plan and treat with merem and vanc.  Monitoring fever trend and de-escalate when appropriate      VTE Risk Mitigation         Ordered     Medium Risk of VTE  Once      01/30/17 1514     Place sequential compression device  Until discontinued      01/30/17 1514          Matilde Hirsch MD  Department of Hospital Medicine   [facility]

## 2017-02-03 NOTE — ASSESSMENT & PLAN NOTE
Continue current plan and treat with merem and vanc.  Monitoring fever trend and de-escalate to levaquin today  If WBC, fever trending up with broaden coverage again

## 2017-02-03 NOTE — PLAN OF CARE
Problem: Patient Care Overview  Goal: Plan of Care Review  Outcome: Ongoing (interventions implemented as appropriate)  Pt doing well on current therapy. No distress noted; resting comfortably.  Specimen cup at bedside for sputum culture.

## 2017-02-03 NOTE — PROGRESS NOTES
"Hernesto Sofia is a 89 y.o. male patient.looks great    Active Hospital Problems    Diagnosis  POA    *Atrial fibrillation with RVR [I48.91]  Yes    Pneumonia of left upper lobe due to infectious organism [J18.1]  Yes    Aortic stenosis [I35.0]  Yes    Leukocytosis [D72.829]  Yes    HLD (hyperlipidemia) [E78.5]  Yes    HTN (hypertension) [I10]  Yes    Chronic right-sided headaches [R51]  Yes    Acute confusional state [F05]  Yes    Cervical radicular pain [M54.12]  Yes      Resolved Hospital Problems    Diagnosis Date Resolved POA   No resolved problems to display.     Temp: 98.5 °F (36.9 °C) (02/03/17 0400)  Pulse: 88 (02/03/17 0729)  Resp: (!) 22 (02/03/17 0729)  BP: 106/78 (02/03/17 0400)  SpO2: 99 % (02/03/17 0729)  Weight: 72.9 kg (160 lb 11.5 oz) (01/30/17 1445)  Height: 5' 7" (170.2 cm) (01/30/17 1445)    Subjective:  Symptoms:  Worsening.  He reports shortness of breath, cough, chest pain, weakness and anxiety.    Diet:  Adequate intake.    Activity level: Impaired due to weakness.    Pain:  He complains of pain that is mild.      Objective:  General Appearance:  Ill-appearing.    Vital signs: (most recent): Blood pressure 106/78, pulse 88, temperature 98.5 °F (36.9 °C), resp. rate (!) 22, height 5' 7" (1.702 m), weight 72.9 kg (160 lb 11.5 oz), SpO2 99 %.  No fever.    Output: Producing urine and producing stool.    Lungs:  Normal respiratory rate.  He is not in respiratory distress.  No stridor.  There are decreased breath sounds.  No wheezes, rales or rhonchi.    Heart: Tachycardia.  Irregular rhythm.  Positive for murmur.  No gallop or friction rub.   Chest: No chest wall tenderness.  Symmetric chest wall expansion.   Extremities: Normal range of motion.  There is no venous stasis, deformity, effusion, local swelling or dependent edema.    Neurological: Patient is alert and oriented to person, place and time.  Normal strength.  Patient has normal reflexes, normal muscle tone and normal " coordination.    Skin:  Warm and dry.  No rash, cyanosis or ulceration.   Abdomen: Abdomen is soft, flat and non-distended.  There are no signs of ascites.  Hypoactive bowel sounds.   There is no abdominal tenderness.     Pupils:  Pupils are equal, round, and reactive to light.    Pulses: Distal pulses are intact.      Assessment:  (SIRS  COPD exacerbation  Clinically  UTI  A Fib    ).     Plan:   Doing much hoda   WBC 11ooo.       Rodolfo Carranza MD  2/3/2017

## 2017-02-03 NOTE — PT/OT/SLP PROGRESS
Occupational Therapy  Treatment    Hernesto Sofia   MRN: [id number]   Admitting Diagnosis: Atrial fibrillation with RVR    OT Date of Treatment: 17   OT Start Time: 1420  OT Stop Time: 1435  OT Total Time (min): 15 min    Billable Minutes:  Therapeutic Activity 5 min and Therapeutic Exercise 10 min    General Precautions: Standard, fall  Braces: N/A    Do you have any cultural, spiritual, Religion conflicts, given your current situation?:  (none verbalized)    Subjective:  Communicated with patient and sitter prior to session.  No new complaints from patient    Pain Ratin/10    Objective:   Functional Mobility:  Bed Mobility:  Patient up in bed side reclining chair with sitter in room on OT arrival.    Activities of Daily Living:  LE dressing: socks: min A     Therapeutic Activities and Exercises:  Completed UE strengthening there ex activity with AROM bilateral UE all movements and joints 2 x 10 reps.  Good endurance and tolerance for activity today.    Patient left up in chair with all lines intact, call button in reach and sitter present    ASSESSMENT:  Hernesto Sofia is a 89 y.o. male with a medical diagnosis of Atrial fibrillation with RVR and presents with weakness, decreased endurance, and decreased ADL skills.  Tolerance for activity improving.    Rehab identified problem list/impairments: Rehab identified problem list/impairments: weakness, impaired self care skills, impaired functional mobilty    Rehab potential is good.    Activity tolerance: Good    Discharge recommendations: Discharge Facility/Level Of Care Needs:  (if home with sitters, home health OT)     Equipment recommendations:  (pt already has a walker, BSC, and shower chair)     GOALS:   Occupational Therapy Goals        Problem: Occupational Therapy Goal    Goal Priority Disciplines Outcome Interventions   Occupational Therapy Goal     OT, PT/OT Ongoing (interventions implemented as appropriate)    Description:  Patient will  increase functional independence with ADLs by performing:    UE Dressing with Set-up Assistance.  LE Dressing with Minimal Assistance.  Grooming to be assessed   Toileting from bedside commode with Supervision for hygiene and clothing management.               Plan:  Patient to be seen  (cont. OT plan of care 3 to 5 x week) to address the above listed problems via self-care/home management, therapeutic activities, therapeutic exercises  Plan of Care expires: upon discharge  Plan of Care reviewed with: patient, caregiver         LUIS ALBERTO Ty  02/03/2017

## 2017-02-03 NOTE — PLAN OF CARE
Problem: Patient Care Overview  Goal: Plan of Care Review  Outcome: Ongoing (interventions implemented as appropriate)  The patient had an unventful night . Not much coughing and no sputum producction to obtain a sample. Left are with less swelling and redness--elevated on pillow. The patient denies SOB or other new complaints. Voiding well , is incontinent at times. Tolerating increase activity and assist with turning and positioning with promptying but still requires some assistance. Alert and cooperative. Siscurred p;an of care with patient but will review with sitter and daughter for reinforcement. Does attempt to get OOB without assist--allprecautions maintained ( zone 2 )

## 2017-02-03 NOTE — PROGRESS NOTES
[facility]  Cardiology  Progress Note    Patient Name: Hernesto Sofia  MRN: [id number]  Admission Date: 1/30/2017  Hospital Length of Stay: 3 days  Code Status: Full Code   Attending Physician: Roxanne Pandey MD   Primary Care Physician: Hasmukh Pederson MD  Expected Discharge Date:   Principal Problem:Atrial fibrillation with RVR    Subjective:     Hospital Course: admit with AMS.    Interval History:CIS consutled for AF/RVR in setting of fever, rate now controlled     ROS   Constitutional: Fever.   Feeling better.  HENT: Negative.   Eyes: Negative.   Respiratory: Negative.   Cardiovascular: Negative.   Neurological: Negative.   Hematological: Negative.   Psychiatric/Behavioral: Negative    Objective:     Vital Signs (Most Recent):  Temp: 98.5 °F (36.9 °C) (02/03/17 0400)  Pulse: 88 (02/03/17 0729)  Resp: (!) 22 (02/03/17 0729)  BP: 106/78 (02/03/17 0400)  SpO2: 99 % (02/03/17 0729) Vital Signs (24h Range):  Temp:  [97.1 °F (36.2 °C)-101.4 °F (38.6 °C)] 98.5 °F (36.9 °C)  Pulse:  [] 88  Resp:  [18-22] 22  SpO2:  [93 %-99 %] 99 %  BP: ()/(63-89) 106/78     Weight: 72.9 kg (160 lb 11.5 oz)  Body mass index is 25.17 kg/(m^2).    SpO2: 99 %  O2 Device (Oxygen Therapy): nasal cannula      Intake/Output Summary (Last 24 hours) at 02/03/17 0876  Last data filed at 02/03/17 0233   Gross per 24 hour   Intake              410 ml   Output              400 ml   Net               10 ml       Lines/Drains/Airways     Peripheral Intravenous Line                 Peripheral IV - Single Lumen 02/02/17 0200 Anterior Upper Arm 1 day                Physical Exam   Constitutional: He is oriented to person, place, and time. He appears well-developed and well-nourished. No distress.   Sitting up in chair with no shortness of breath or distress   Eyes: Conjunctivae and EOM are normal.   Cardiovascular: An irregularly irregular rhythm present. 90s HR : irregulary irregular   Pulmonary/Chest: Effort  normal. Breath sounds normal.   Abdominal: Soft.   Musculoskeletal: He exhibits edema (trace edema to ankles bilaterally).   Neurological: He is alert and oriented to person, place, and time. No cranial nerve deficit.   Skin: Skin is warm and dry.   Psychiatric: He has a normal mood and affect. His behavior is normal.   Nursing note and vitals reviewed.    Significant Labs:     Glucose (mg/dL)   Date/Time Value Status   02/03/2017 04:46  Final   02/02/2017 07:30  Final   02/01/2017 05:31  Final     Sodium (mmol/L)   Date/Time Value Status   02/03/2017 04:46  Final   02/02/2017 07:30  (L) Final   02/01/2017 05:31  (L) Final     Potassium (mmol/L)   Date/Time Value Status   02/03/2017 04:46 AM 3.4 (L) Final   02/02/2017 07:30 AM 3.9 Final   02/01/2017 05:31 AM 4.4 Final     Chloride (mmol/L)   Date/Time Value Status   02/03/2017 04:46  Final   02/02/2017 07:30  Final   02/01/2017 05:31  Final     CO2 (mmol/L)   Date/Time Value Status   02/03/2017 04:46 AM 25 Final   02/02/2017 07:30 AM 23 Final   02/01/2017 05:31 AM 23 Final     BUN, Bld (mg/dL)   Date/Time Value Status   02/03/2017 04:46 AM 19 Final   02/02/2017 07:30 AM 19 Final   02/01/2017 05:31 AM 25 (H) Final     Creatinine (mg/dL)   Date/Time Value Status   02/03/2017 04:46 AM 0.8 Final   02/02/2017 07:30 AM 0.8 Final   02/01/2017 05:31 AM 0.9 Final     Calcium (mg/dL)   Date/Time Value Status   02/03/2017 04:46 AM 8.9 Final   02/02/2017 07:30 AM 9.2 Final   02/01/2017 05:31 AM 8.8 Final     Magnesium (mg/dL)   Date/Time Value Status   01/30/2017 09:37 AM 2.2 Final   01/17/2017 06:51 PM 2.0 Final   , INR   INR (no units)   Date/Time Value Status   02/03/2017 04:46 AM 2.1 (H) Final   02/02/2017 07:30 AM 2.6 (H) Final   02/01/2017 05:31 AM 3.3 (H) Final    and Troponin   Troponin I (ng/mL)   Date/Time Value Status   02/01/2017 03:12 AM 0.008 Final   01/31/2017 09:09 PM 0.021 Final   01/31/2017 06:12 PM 0.015  Final       Significant Imaging: X-Ray: CXR: X-Ray Chest 1 View (CXR):   Results for orders placed or performed during the hospital encounter of 01/30/17   X-Ray Chest 1 View    Narrative    Chest, 1 view: Suspected left retrocardiac opacity, new from the prior examination, which could represent pleural fluid, atelectasis or pneumonia.  The right lung is clear.  The heart is moderately enlarged.  Callus but atheromatous disease affects the aorta.  Left-sided pacemaker device is in place.    Impression     As above.      Electronically signed by: Yany Thornton MD  Date:     02/02/17  Time:    09:32      Assessment and Plan:     AMS in a frail 89 yo; currently appears stable without c/o but recurrence of fever now resolved  No CHF on exam   Chronic A fib; rate better controlled  S/p PM for SSS  HTN controlled LVEF 50% LVH AS 1.3 Mild MR, PAS 38 3/12, stable per echo   Normal stress perf 2009   Leukocytosis     Plan:   Daily INR; seems to need much less coumadin than before  BB adjusted for HR control  Has PPM  Otherwise cardiac stable  Will sign off and make outpatient appointment soon    Active Diagnoses:    Diagnosis Date Noted POA    PRINCIPAL PROBLEM:  Atrial fibrillation with RVR [I48.91] 01/30/2017 Yes    Pneumonia of left upper lobe due to infectious organism [J18.1] 02/02/2017 Yes    Aortic stenosis [I35.0] 02/01/2017 Yes    Leukocytosis [D72.829] 01/30/2017 Yes    HLD (hyperlipidemia) [E78.5] 01/30/2017 Yes    HTN (hypertension) [I10] 01/30/2017 Yes    Chronic right-sided headaches [R51] 01/30/2017 Yes    Acute confusional state [F05] 01/30/2017 Yes    Cervical radicular pain [M54.12] 01/30/2017 Yes      Problems Resolved During this Admission:    Diagnosis Date Noted Date Resolved POA       VTE Risk Mitigation         Ordered     Medium Risk of VTE  Once      01/30/17 1514     Place sequential compression device  Until discontinued      01/30/17 1514          Anticipated Disposition: Home or Self  Care and Home-Health Care Svc    Discharge Needs: Med Availability    Christina Torres NP  Cardiology  [facility]    I attest that I have personally seen and examined this patient. I have reviewed and discussed the management in detail as outlined above.

## 2017-02-03 NOTE — ASSESSMENT & PLAN NOTE
Was on coumadin as outpatient, INR 3.3 wed-decreased dose to 1 mg q hs;   INR 2.1  Daily INR. Home dose 5mg Mon, Wed, Fri and 2.5mg Tues, Thurs, Sat and Sun; watch closely on a/b  On sotalol, will continue-now rate controlled.   Was not started on dilt gtt due to BP  IV metoprolol PRN for HR > 110  TTE ordered-reviewed; AS mod  CIS following

## 2017-02-04 PROBLEM — E87.6 HYPOKALEMIA: Status: RESOLVED | Noted: 2017-02-03 | Resolved: 2017-02-04

## 2017-02-04 LAB
ANION GAP SERPL CALC-SCNC: 12 MMOL/L
BACTERIA BLD CULT: NORMAL
BUN SERPL-MCNC: 17 MG/DL
CALCIUM SERPL-MCNC: 9 MG/DL
CHLORIDE SERPL-SCNC: 101 MMOL/L
CO2 SERPL-SCNC: 25 MMOL/L
CREAT SERPL-MCNC: 0.8 MG/DL
EST. GFR  (AFRICAN AMERICAN): >60 ML/MIN/1.73 M^2
EST. GFR  (NON AFRICAN AMERICAN): >60 ML/MIN/1.73 M^2
GLUCOSE SERPL-MCNC: 98 MG/DL
INR PPP: 2
POTASSIUM SERPL-SCNC: 3.7 MMOL/L
PROTHROMBIN TIME: 20.1 SEC
SODIUM SERPL-SCNC: 138 MMOL/L
VANCOMYCIN TROUGH SERPL-MCNC: 18.1 UG/ML

## 2017-02-04 PROCEDURE — 97110 THERAPEUTIC EXERCISES: CPT

## 2017-02-04 PROCEDURE — 97116 GAIT TRAINING THERAPY: CPT

## 2017-02-04 PROCEDURE — 25000242 PHARM REV CODE 250 ALT 637 W/ HCPCS: Performed by: INTERNAL MEDICINE

## 2017-02-04 PROCEDURE — 63600175 PHARM REV CODE 636 W HCPCS: Performed by: FAMILY MEDICINE

## 2017-02-04 PROCEDURE — 25000003 PHARM REV CODE 250: Performed by: INTERNAL MEDICINE

## 2017-02-04 PROCEDURE — 25000003 PHARM REV CODE 250: Performed by: NURSE PRACTITIONER

## 2017-02-04 PROCEDURE — 25000003 PHARM REV CODE 250: Performed by: SURGERY

## 2017-02-04 PROCEDURE — 25000003 PHARM REV CODE 250: Performed by: FAMILY MEDICINE

## 2017-02-04 PROCEDURE — 94640 AIRWAY INHALATION TREATMENT: CPT

## 2017-02-04 PROCEDURE — 63600175 PHARM REV CODE 636 W HCPCS: Performed by: INTERNAL MEDICINE

## 2017-02-04 PROCEDURE — 11000001 HC ACUTE MED/SURG PRIVATE ROOM

## 2017-02-04 PROCEDURE — 80202 ASSAY OF VANCOMYCIN: CPT

## 2017-02-04 PROCEDURE — 80048 BASIC METABOLIC PNL TOTAL CA: CPT

## 2017-02-04 PROCEDURE — 27200120 HC KIT IV START (RUSH ONLY)

## 2017-02-04 PROCEDURE — 99232 SBSQ HOSP IP/OBS MODERATE 35: CPT | Mod: ,,, | Performed by: INTERNAL MEDICINE

## 2017-02-04 PROCEDURE — 27000339 *HC DAILY SUPPLY KIT

## 2017-02-04 PROCEDURE — 36415 COLL VENOUS BLD VENIPUNCTURE: CPT

## 2017-02-04 PROCEDURE — 85610 PROTHROMBIN TIME: CPT

## 2017-02-04 PROCEDURE — 94761 N-INVAS EAR/PLS OXIMETRY MLT: CPT

## 2017-02-04 RX ADMIN — HYDROCORTISONE 2.5%: 25 CREAM TOPICAL at 09:02

## 2017-02-04 RX ADMIN — MEROPENEM: 1 INJECTION, POWDER, FOR SOLUTION INTRAVENOUS at 09:02

## 2017-02-04 RX ADMIN — LEVOFLOXACIN 750 MG: 5 INJECTION, SOLUTION INTRAVENOUS at 12:02

## 2017-02-04 RX ADMIN — IPRATROPIUM BROMIDE AND ALBUTEROL SULFATE 3 ML: .5; 3 SOLUTION RESPIRATORY (INHALATION) at 06:02

## 2017-02-04 RX ADMIN — IPRATROPIUM BROMIDE AND ALBUTEROL SULFATE 3 ML: .5; 3 SOLUTION RESPIRATORY (INHALATION) at 12:02

## 2017-02-04 RX ADMIN — MEROPENEM: 1 INJECTION, POWDER, FOR SOLUTION INTRAVENOUS at 01:02

## 2017-02-04 RX ADMIN — SOTALOL HYDROCHLORIDE 80 MG: 80 TABLET ORAL at 08:02

## 2017-02-04 RX ADMIN — HYDROCORTISONE 2.5%: 25 CREAM TOPICAL at 08:02

## 2017-02-04 RX ADMIN — ATORVASTATIN CALCIUM 40 MG: 40 TABLET, FILM COATED ORAL at 09:02

## 2017-02-04 RX ADMIN — WARFARIN SODIUM 1 MG: 1 TABLET ORAL at 06:02

## 2017-02-04 RX ADMIN — PANTOPRAZOLE SODIUM 40 MG: 40 TABLET, DELAYED RELEASE ORAL at 09:02

## 2017-02-04 RX ADMIN — SOTALOL HYDROCHLORIDE 80 MG: 80 TABLET ORAL at 09:02

## 2017-02-04 RX ADMIN — IPRATROPIUM BROMIDE AND ALBUTEROL SULFATE 3 ML: .5; 3 SOLUTION RESPIRATORY (INHALATION) at 01:02

## 2017-02-04 RX ADMIN — MEROPENEM: 1 INJECTION, POWDER, FOR SOLUTION INTRAVENOUS at 05:02

## 2017-02-04 RX ADMIN — VANCOMYCIN HYDROCHLORIDE 1000 MG: 1 INJECTION, POWDER, LYOPHILIZED, FOR SOLUTION INTRAVENOUS at 05:02

## 2017-02-04 NOTE — ASSESSMENT & PLAN NOTE
WBC 21K on admission>>14>>11>>9  Source is unclear, likely bronchitis  UA minimal, CXR is negative on admission  Blood cx NGTD  Started azithro and rocephin as wheezing and having some SOB on admission  CXR Tuesday with small effusion; d/c ivf  Tmax 2/1/17102.7-Check flu (neg)  Antibiotics changed to Meropenem and Vanc on 2/1/17pm - WBC ct improving. Will watch fever curve. Now with concern for PNA based on CXR findings  De-escalate to levaquini 2/3 (normal renal function but will likely influence his INR) but fever curve is trending up again (tmax 99.9). I will monitor for now and if temp trending up will have to broaden. Trying to get him on a regimen to send home with

## 2017-02-04 NOTE — ASSESSMENT & PLAN NOTE
Continue current plan and treat with merem and vanc.  Monitoring fever trend and de-escalate to levaquin 2/3  tmax 99.9, follow. If trending up we will have to broaden coverage again as no fevers with kameron and vanc but trying to find a combo to send home with.

## 2017-02-04 NOTE — PLAN OF CARE
Problem: Patient Care Overview  Goal: Plan of Care Review  Outcome: Ongoing (interventions implemented as appropriate)  Patient had good night; breath sounds clear; infrequent cough; 02 saturation 96% on room air. Patient with low grade temperature(99.8 orally).  Continues on abx's. Left arm still slightly red and swollen; but improving. A-Fib on telemetry; fall precautions maintained. Voiding in urinal. Ambulated to bathroom; had bowel movement. Denies any pain or shortness of breath. Bed low and locked; bed exit alarm in use; call bell in reach. Plan of care reviewed with patient; needs reinforcement.

## 2017-02-04 NOTE — ASSESSMENT & PLAN NOTE
"Was brought to ER for this reason per ER records  Patient reports "out of body experience" but can't elaborate further  Now alert and oriented to person, place, time  Neurology consulted-? Related to infection; now improved  ?? metabolic, infection vs cardiac with RVR    EEG pending    "

## 2017-02-04 NOTE — PROGRESS NOTES
"[facility] Hospital Medicine  Progress Note    Patient Name: Hernesto Sofia  MRN: [id number]  Patient Class: IP- Inpatient   Admission Date: 1/30/2017  Length of Stay: 4 days  Attending Physician: Roxanne Pandey MD  Primary Care Provider: Hasmukh Pederson MD        Subjective:     Principal Problem:Atrial fibrillation with RVR    HPI:  Patient presented to ER with confusion and shortness of breath. Was brought in by his daughter who is not at the bedside this evening. Patient reports he was having an "out of body experience" and that's why he was brought here. Doesn't give much more detail than this. He denies SOB, chest pains, palpitations, LE edema, orthopnea. Denies dysuria, hematuria but does report frequency and incomplete bladder emptying. He denies abdominal pain, n/v/d/c. He is alert and oriented this evening but is a poor historian.     Hospital Course:  Was found to bed in a[facility] with RVR with rate low 100s. CXR was normal, no consolidation or pulmonary edema. CIS and pulmonary consulted to assist with management. CT head was negative, neurology consulted in ED and planned for MRI brain but he has pacemaker. Was to start on dilt gtt but HR low 100s and BP on the low side so managing with IV metoprolol.      Since metoprolol his HR is around 66-98.     He is on Zithromax and ceftriaxone x 3 days on Wed. Had low grade fever Tuesday night up to 100.6. Flu test added, neg. On admit, mild uti noted. Spiked 102.7 yesterday after CT dye extravasation in his arm. A/b changed to Meropenem and Vanc. Repeat u/a and CXR ordered today. CXR reviewed, new left retrocardiac opacity noted. Patient says he is feeling great.    No fevers, leukocytosis since adding antibx. Denies SOB, chest pain, LE edema. Changed from Marily and Vanc to Levaquin on 2/3/17 and now with low grade fevers again to 99.9 overnight. He denies feeling SOB, coughing.       Interval History: no acute events overnight. Still " with low grade fever 99.9F    Review of Systems   Constitutional: Negative for activity change, fatigue, fever and unexpected weight change.   HENT: Negative for congestion, ear pain, hearing loss, rhinorrhea and sore throat.    Eyes: Negative for pain, redness and visual disturbance.   Respiratory: Negative for cough, shortness of breath and wheezing.    Cardiovascular: Negative for chest pain, palpitations and leg swelling.   Gastrointestinal: Negative for abdominal pain, constipation, diarrhea, nausea and vomiting.   Genitourinary: Negative for decreased urine volume, dysuria, frequency and urgency.   Musculoskeletal: Negative for back pain, joint swelling and neck pain.   Skin: Negative for color change, rash and wound.   Neurological: Negative for dizziness, tremors, weakness, light-headedness and headaches.     Objective:     Vital Signs (Most Recent):  Temp: 99.2 °F (37.3 °C) (02/04/17 0730)  Pulse: 105 (02/04/17 0800)  Resp: 18 (02/04/17 0730)  BP: 100/62 (02/04/17 0730)  SpO2: 97 % (02/04/17 0730) Vital Signs (24h Range):  Temp:  [99.1 °F (37.3 °C)-100 °F (37.8 °C)] 99.2 °F (37.3 °C)  Pulse:  [] 105  Resp:  [16-22] 18  SpO2:  [92 %-98 %] 97 %  BP: ()/(62-85) 100/62     Weight: 72.9 kg (160 lb 11.5 oz)  Body mass index is 25.17 kg/(m^2).    Intake/Output Summary (Last 24 hours) at 02/04/17 0904  Last data filed at 02/04/17 0620   Gross per 24 hour   Intake              470 ml   Output              500 ml   Net              -30 ml      Physical Exam   Constitutional: He is oriented to person, place, and time. He appears well-developed and well-nourished. No distress.   HENT:   Head: Normocephalic and atraumatic.   Right Ear: External ear normal.   Left Ear: External ear normal.   Eyes: Conjunctivae and EOM are normal. Pupils are equal, round, and reactive to light. Right eye exhibits no discharge. Left eye exhibits no discharge.   Neck: Neck supple. No tracheal deviation present.   Cardiovascular:  Normal rate and regular rhythm.  Exam reveals no gallop and no friction rub.    No murmur heard.  Pulmonary/Chest: Effort normal and breath sounds normal. No respiratory distress. He has no wheezes. He has no rales.   Abdominal: Soft. Bowel sounds are normal. He exhibits no distension. There is no tenderness.   Musculoskeletal: He exhibits no edema.   Neurological: He is alert and oriented to person, place, and time. No cranial nerve deficit.   Skin: Skin is warm and dry.   Bruising and swelling of left arm, decreasing   Psychiatric: He has a normal mood and affect. His behavior is normal.   Vitals reviewed.      Significant Labs:   CBC:   Recent Labs  Lab 02/03/17  0446   WBC 8.35   HGB 12.5*   HCT 35.5*        CMP:   Recent Labs  Lab 02/03/17 0446 02/04/17  0441    138   K 3.4* 3.7    101   CO2 25 25    98   BUN 19 17   CREATININE 0.8 0.8   CALCIUM 8.9 9.0   ANIONGAP 10 12   EGFRNONAA >60 >60     All pertinent labs within the past 24 hours have been reviewed.    Significant Imaging: I have reviewed all pertinent imaging results/findings within the past 24 hours.    Assessment/Plan:      * Atrial fibrillation with RVR  Was on coumadin as outpatient, INR 3.3 wed-decreased dose to 1 mg q hs;   INR 2  Daily INR. Home dose 5mg Mon, Wed, Fri and 2.5mg Tues, Thurs, Sat and Sun; watch closely on a/b  On sotalol, will continue-now rate controlled.   Was not started on dilt gtt due to BP  IV metoprolol PRN for HR > 110;   TTE ordered-reviewed; AS mod  CIS following      Leukocytosis  WBC 21K on admission>>14>>11>>9  Source is unclear, likely bronchitis  UA minimal, CXR is negative on admission  Blood cx NGTD  Started azithro and rocephin as wheezing and having some SOB on admission  CXR Tuesday with small effusion; d/c ivf  Tmax 2/1/17102.7-Check flu (neg)  Antibiotics changed to Meropenem and Vanc on 2/1/17pm - WBC ct improving. Will watch fever curve. Now with concern for PNA based on CXR  "findings  De-escalate to levaquini 2/3 (normal renal function but will likely influence his INR) but fever curve is trending up again (tmax 99.9). I will monitor for now and if temp trending up will have to broaden. Trying to get him on a regimen to send home with    HLD (hyperlipidemia)  Continue home atorvastatin      HTN (hypertension)  On HCTZ prior to admission, restarted      Chronic right-sided headaches  This seems to be a chronic issue for him  CT head negative  ESR OK--less likely temporal arteritis, not tender on exam  Can't do MRI due to PPM  Neurology was consulted in ED-Trigeminal neuralgia vs cervical radicular pain in differential; ? Start low dose gabapentin for proph  Tylenol PRN for pain for now  EEG pending    Acute confusional state  Was brought to ER for this reason per ER records  Patient reports "out of body experience" but can't elaborate further  Now alert and oriented to person, place, time  Neurology consulted-? Related to infection; now improved  ?? metabolic, infection vs cardiac with RVR    EEG pending      Cervical radicular pain  Trigeminal neuralgia vs cervical radicular pain in differential  ? Start low dose gabapentin for proph  CTA head and neck was attempted on Wed, IV infiltrated with contrast, will re-attempt today  Dr. Arizmendi following      Aortic stenosis  Cards following  Not retaining fluid      Pneumonia of left upper lobe due to infectious organism  Continue current plan and treat with merem and vanc.  Monitoring fever trend and de-escalate to levaquin 2/3  tmax 99.9, follow. If trending up we will have to broaden coverage again as no fevers with kameron and vanc but trying to find a combo to send home with.      Hypokalemia, resolved as of 2/4/2017  Stable today, follow lab    VTE Risk Mitigation         Ordered     Medium Risk of VTE  Once      01/30/17 1514     Place sequential compression device  Until discontinued      01/30/17 1514          Roxanne Pandey, " MD  Department of [facility] Medicine   [facility]

## 2017-02-04 NOTE — SUBJECTIVE & OBJECTIVE
Interval History: no acute events overnight. Still with low grade fever 99.9F    Review of Systems   Constitutional: Negative for activity change, fatigue, fever and unexpected weight change.   HENT: Negative for congestion, ear pain, hearing loss, rhinorrhea and sore throat.    Eyes: Negative for pain, redness and visual disturbance.   Respiratory: Negative for cough, shortness of breath and wheezing.    Cardiovascular: Negative for chest pain, palpitations and leg swelling.   Gastrointestinal: Negative for abdominal pain, constipation, diarrhea, nausea and vomiting.   Genitourinary: Negative for decreased urine volume, dysuria, frequency and urgency.   Musculoskeletal: Negative for back pain, joint swelling and neck pain.   Skin: Negative for color change, rash and wound.   Neurological: Negative for dizziness, tremors, weakness, light-headedness and headaches.     Objective:     Vital Signs (Most Recent):  Temp: 99.2 °F (37.3 °C) (02/04/17 0730)  Pulse: 105 (02/04/17 0800)  Resp: 18 (02/04/17 0730)  BP: 100/62 (02/04/17 0730)  SpO2: 97 % (02/04/17 0730) Vital Signs (24h Range):  Temp:  [99.1 °F (37.3 °C)-100 °F (37.8 °C)] 99.2 °F (37.3 °C)  Pulse:  [] 105  Resp:  [16-22] 18  SpO2:  [92 %-98 %] 97 %  BP: ()/(62-85) 100/62     Weight: 72.9 kg (160 lb 11.5 oz)  Body mass index is 25.17 kg/(m^2).    Intake/Output Summary (Last 24 hours) at 02/04/17 0904  Last data filed at 02/04/17 0620   Gross per 24 hour   Intake              470 ml   Output              500 ml   Net              -30 ml      Physical Exam   Constitutional: He is oriented to person, place, and time. He appears well-developed and well-nourished. No distress.   HENT:   Head: Normocephalic and atraumatic.   Right Ear: External ear normal.   Left Ear: External ear normal.   Eyes: Conjunctivae and EOM are normal. Pupils are equal, round, and reactive to light. Right eye exhibits no discharge. Left eye exhibits no discharge.   Neck: Neck  supple. No tracheal deviation present.   Cardiovascular: Normal rate and regular rhythm.  Exam reveals no gallop and no friction rub.    No murmur heard.  Pulmonary/Chest: Effort normal and breath sounds normal. No respiratory distress. He has no wheezes. He has no rales.   Abdominal: Soft. Bowel sounds are normal. He exhibits no distension. There is no tenderness.   Musculoskeletal: He exhibits no edema.   Neurological: He is alert and oriented to person, place, and time. No cranial nerve deficit.   Skin: Skin is warm and dry.   Bruising and swelling of left arm, decreasing   Psychiatric: He has a normal mood and affect. His behavior is normal.   Vitals reviewed.      Significant Labs:   CBC:   Recent Labs  Lab 02/03/17  0446   WBC 8.35   HGB 12.5*   HCT 35.5*        CMP:   Recent Labs  Lab 02/03/17 0446 02/04/17  0441    138   K 3.4* 3.7    101   CO2 25 25    98   BUN 19 17   CREATININE 0.8 0.8   CALCIUM 8.9 9.0   ANIONGAP 10 12   EGFRNONAA >60 >60     All pertinent labs within the past 24 hours have been reviewed.    Significant Imaging: I have reviewed all pertinent imaging results/findings within the past 24 hours.

## 2017-02-04 NOTE — ASSESSMENT & PLAN NOTE
Was on coumadin as outpatient, INR 3.3 wed-decreased dose to 1 mg q hs;   INR 2  Daily INR. Home dose 5mg Mon, Wed, Fri and 2.5mg Tues, Thurs, Sat and Sun; watch closely on a/b  On sotalol, will continue-now rate controlled.   Was not started on dilt gtt due to BP  IV metoprolol PRN for HR > 110;   TTE ordered-reviewed; AS mod  CIS following

## 2017-02-04 NOTE — PROGRESS NOTES
"Hernesto Sofia is a 89 y.o. male patient.looks great    Active Hospital Problems    Diagnosis  POA    *Atrial fibrillation with RVR [I48.91]  Yes    Hypokalemia [E87.6]  No    Pneumonia of left upper lobe due to infectious organism [J18.1]  Yes    Aortic stenosis [I35.0]  Yes    Leukocytosis [D72.829]  Yes    HLD (hyperlipidemia) [E78.5]  Yes    HTN (hypertension) [I10]  Yes    Chronic right-sided headaches [R51]  Yes    Acute confusional state [F05]  Yes    Cervical radicular pain [M54.12]  Yes      Resolved Hospital Problems    Diagnosis Date Resolved POA   No resolved problems to display.     Temp: 99.2 °F (37.3 °C) (02/04/17 0730)  Pulse: 63 (02/04/17 0654)  Resp: 18 (02/04/17 0730)  BP: 100/62 (02/04/17 0730)  SpO2: 97 % (02/04/17 0730)  Weight: 72.9 kg (160 lb 11.5 oz) (01/30/17 1445)  Height: 5' 7" (170.2 cm) (01/30/17 1445)    Subjective:  Symptoms:  Worsening.  He reports shortness of breath, cough, chest pain, weakness and anxiety.    Diet:  Adequate intake.    Activity level: Impaired due to weakness.    Pain:  He complains of pain that is mild.      Objective:  General Appearance:  Ill-appearing.    Vital signs: (most recent): Blood pressure 100/62, pulse 63, temperature 99.2 °F (37.3 °C), temperature source Oral, resp. rate 18, height 5' 7" (1.702 m), weight 72.9 kg (160 lb 11.5 oz), SpO2 97 %.  No fever.    Output: Producing urine and producing stool.    Lungs:  Normal respiratory rate.  He is not in respiratory distress.  No stridor.  There are decreased breath sounds.  No wheezes, rales or rhonchi.    Heart: Tachycardia.  Irregular rhythm.  Positive for murmur.  No gallop or friction rub.   Chest: No chest wall tenderness.  Symmetric chest wall expansion.   Extremities: Normal range of motion.  There is no venous stasis, deformity, effusion, local swelling or dependent edema.    Neurological: Patient is alert and oriented to person, place and time.  Normal strength.  Patient has normal " reflexes, normal muscle tone and normal coordination.    Skin:  Warm and dry.  No rash, cyanosis or ulceration.   Abdomen: Abdomen is soft, flat and non-distended.  There are no signs of ascites.  Hypoactive bowel sounds.   There is no abdominal tenderness.     Pupils:  Pupils are equal, round, and reactive to light.    Pulses: Distal pulses are intact.      Assessment:  (COPD exacerbation  Clinically  UTI  A Fib    ).     Plan:   Doing much better   WBC 83oo  Home today.       Rodolfo Carranza MD  2/4/2017

## 2017-02-04 NOTE — NURSING
Report received. Patient in bed watching television. Call bell and personal items within reach. Instructed to call with needs.

## 2017-02-04 NOTE — PLAN OF CARE
Problem: Patient Care Overview  Goal: Plan of Care Review  Outcome: Ongoing (interventions implemented as appropriate)  Fall precautions maintained. Patient remains free from falls/injuries. Patient ambulated and spent most of the day up in chair. Family came to visit in AM. IV ABX administered. Unable to collect sputum since patient cough in nonproductive. Left arm still slightly swollen and red, but improving. Patient hoping to be discharged on 2/5/17; MD thinks this may be possible. Patient stable, afebrile. Plan of care reviewed with patient and family; both agree with plan of care.

## 2017-02-04 NOTE — NURSING
Received report from MADDIE Paige.  Patient sitting in recliner watching television. Voices no complaints.

## 2017-02-04 NOTE — PT/OT/SLP PROGRESS
Physical Therapy  Treatment    Hernesto Sofia   MRN: [id number]   Admitting Diagnosis: Atrial fibrillation with RVR    PT Received On: 02/04/17  PT Start Time: 0830     PT Stop Time: 0855    PT Total Time (min): 25 min       Billable Minutes:  Gait [id number] min and Therapeutic Exercise 10 min    Treatment Type: Treatment  PT/PTA: PT             General Precautions: Standard, fall  Orthopedic Precautions: N/A   Braces:      Do you have any cultural, spiritual, Anabaptism conflicts, given your current situation?: none voiced    Subjective:  Communicated with patient and two family members prior to session.  Seem to be doing OK today.                        Objective:        Functional Mobility:  Bed Mobility:        Transfers:       Gait:   Gait Distance: 100 ft    Stairs:      Balance:   Static Sit: NORMAL: No deviations seen in posture held statically  Dynamic Sit: GOOD+: Maintains balance through MAXIMAL excursions of active trunk motion  Static Stand: GOOD: Takes MODERATE challenges from all directions  Dynamic stand: GOOD-: Needs SUPERVISION only during gait and able to self right with moderate      Therapeutic Activities and Exercises:  Multiple repetitive assistive  exercises for all four extremities to increase strength and control of all major muscle groups  Transfer training for trunk eccentric strengthening several bouts of sit to stand and vice versa.     AM-PAC 6 CLICK MOBILITY  How much help from another person does this patient currently need?   1 = Unable, Total/Dependent Assistance  2 = A lot, Maximum/Moderate Assistance  3 = A little, Minimum/Contact Guard/Supervision  4 = None, Modified [place]/Independent         AM-PAC Raw Score CMS G-Code Modifier Level of Impairment Assistance   6 % Total / Unable   7 - 9 CM 80 - 100% Maximal Assist   10 - 14 CL 60 - 80% Moderate Assist   15 - 19 CK 40 - 60% Moderate Assist   20 - 22 CJ 20 - 40% Minimal Assist   23 CI 1-20% SBA / CGA   24 CH 0%  Independent/ Mod I     Patient left up in chair with all lines intact, call button in reach and family members present.    Assessment:  Hernesto Sofia is a 89 y.o. male with a medical diagnosis of Atrial fibrillation with RVR and presents with generalized weakness and endurance deficits..    Rehab identified problem list/impairments:      Rehab potential is good.    Activity tolerance: Good    Discharge recommendations:       Barriers to discharge:      Equipment recommendations:       GOALS:   Physical Therapy Goals        Problem: Physical Therapy Goal    Goal Priority Disciplines Outcome Goal Variances Interventions   Physical Therapy Goal     PT/OT, PT      Description:  Goals to be met by: 2/10/2017     Patient will increase functional independence with mobility by performin. Supine to sit with Stand-by Assistance  2. Sit to supine with Stand-by Assistance  3. Rolling to Left and Right with Stand-by Assistance.  4. Sit to stand transfer with Stand-by Assistance  5. Bed to chair transfer with Stand-by Assistance using Rolling Walker  6. Gait  x 150 feet with Stand-by Assistance using Rolling Walker.                 PLAN:    Patient to be seen daily  to address the above listed problems via gait training, therapeutic activities, therapeutic exercises  Plan of Care expires:  (Upon D/C from facility)  Plan of Care reviewed with: patient, family         Johny Mercedes, PT  2017

## 2017-02-05 VITALS
RESPIRATION RATE: 20 BRPM | SYSTOLIC BLOOD PRESSURE: 96 MMHG | HEART RATE: 90 BPM | WEIGHT: 160.69 LBS | TEMPERATURE: 99 F | OXYGEN SATURATION: 94 % | DIASTOLIC BLOOD PRESSURE: 1 MMHG | BODY MASS INDEX: 25.22 KG/M2 | HEIGHT: 67 IN

## 2017-02-05 LAB
ANION GAP SERPL CALC-SCNC: 10 MMOL/L
BASOPHILS # BLD AUTO: 0.03 K/UL
BASOPHILS NFR BLD: 0.3 %
BUN SERPL-MCNC: 17 MG/DL
CALCIUM SERPL-MCNC: 9 MG/DL
CHLORIDE SERPL-SCNC: 103 MMOL/L
CO2 SERPL-SCNC: 24 MMOL/L
CREAT SERPL-MCNC: 0.8 MG/DL
DIFFERENTIAL METHOD: ABNORMAL
EOSINOPHIL # BLD AUTO: 0.3 K/UL
EOSINOPHIL NFR BLD: 2.5 %
ERYTHROCYTE [DISTWIDTH] IN BLOOD BY AUTOMATED COUNT: 12.7 %
EST. GFR  (AFRICAN AMERICAN): >60 ML/MIN/1.73 M^2
EST. GFR  (NON AFRICAN AMERICAN): >60 ML/MIN/1.73 M^2
GLUCOSE SERPL-MCNC: 105 MG/DL
HCT VFR BLD AUTO: 36.4 %
HGB BLD-MCNC: 12.8 G/DL
INR PPP: 1.7
LYMPHOCYTES # BLD AUTO: 1.4 K/UL
LYMPHOCYTES NFR BLD: 13.5 %
MCH RBC QN AUTO: 32.6 PG
MCHC RBC AUTO-ENTMCNC: 35.2 %
MCV RBC AUTO: 93 FL
MONOCYTES # BLD AUTO: 1.2 K/UL
MONOCYTES NFR BLD: 11.4 %
NEUTROPHILS # BLD AUTO: 7.5 K/UL
NEUTROPHILS NFR BLD: 72.3 %
PLATELET # BLD AUTO: 160 K/UL
PMV BLD AUTO: 11.3 FL
POTASSIUM SERPL-SCNC: 3.8 MMOL/L
PROTHROMBIN TIME: 16.8 SEC
RBC # BLD AUTO: 3.93 M/UL
SODIUM SERPL-SCNC: 137 MMOL/L
WBC # BLD AUTO: 10.39 K/UL

## 2017-02-05 PROCEDURE — 80048 BASIC METABOLIC PNL TOTAL CA: CPT

## 2017-02-05 PROCEDURE — 97116 GAIT TRAINING THERAPY: CPT

## 2017-02-05 PROCEDURE — 25000242 PHARM REV CODE 250 ALT 637 W/ HCPCS: Performed by: INTERNAL MEDICINE

## 2017-02-05 PROCEDURE — 63600175 PHARM REV CODE 636 W HCPCS: Performed by: INTERNAL MEDICINE

## 2017-02-05 PROCEDURE — 25000003 PHARM REV CODE 250: Performed by: FAMILY MEDICINE

## 2017-02-05 PROCEDURE — 85025 COMPLETE CBC W/AUTO DIFF WBC: CPT

## 2017-02-05 PROCEDURE — 36415 COLL VENOUS BLD VENIPUNCTURE: CPT

## 2017-02-05 PROCEDURE — 27200120 HC KIT IV START (RUSH ONLY)

## 2017-02-05 PROCEDURE — 25000003 PHARM REV CODE 250: Performed by: NURSE PRACTITIONER

## 2017-02-05 PROCEDURE — 25000003 PHARM REV CODE 250: Performed by: SURGERY

## 2017-02-05 PROCEDURE — 97110 THERAPEUTIC EXERCISES: CPT

## 2017-02-05 PROCEDURE — 99232 SBSQ HOSP IP/OBS MODERATE 35: CPT | Mod: ,,, | Performed by: INTERNAL MEDICINE

## 2017-02-05 PROCEDURE — 94640 AIRWAY INHALATION TREATMENT: CPT

## 2017-02-05 PROCEDURE — 63600175 PHARM REV CODE 636 W HCPCS: Performed by: FAMILY MEDICINE

## 2017-02-05 PROCEDURE — 85610 PROTHROMBIN TIME: CPT

## 2017-02-05 PROCEDURE — 25000003 PHARM REV CODE 250: Performed by: INTERNAL MEDICINE

## 2017-02-05 RX ORDER — HYDROCORTISONE 25 MG/G
CREAM TOPICAL 2 TIMES DAILY
Qty: 30 G | Refills: 0 | Status: SHIPPED | OUTPATIENT
Start: 2017-02-05 | End: 2017-02-15

## 2017-02-05 RX ORDER — LEVOFLOXACIN 750 MG/1
750 TABLET ORAL DAILY
Qty: 6 TABLET | Refills: 0 | Status: SHIPPED | OUTPATIENT
Start: 2017-02-05 | End: 2017-02-11

## 2017-02-05 RX ADMIN — LEVOFLOXACIN 750 MG: 5 INJECTION, SOLUTION INTRAVENOUS at 09:02

## 2017-02-05 RX ADMIN — HYDROCORTISONE 2.5%: 25 CREAM TOPICAL at 09:02

## 2017-02-05 RX ADMIN — SOTALOL HYDROCHLORIDE 80 MG: 80 TABLET ORAL at 09:02

## 2017-02-05 RX ADMIN — MEROPENEM: 1 INJECTION, POWDER, FOR SOLUTION INTRAVENOUS at 01:02

## 2017-02-05 RX ADMIN — ATORVASTATIN CALCIUM 40 MG: 40 TABLET, FILM COATED ORAL at 09:02

## 2017-02-05 RX ADMIN — IPRATROPIUM BROMIDE AND ALBUTEROL SULFATE 3 ML: .5; 3 SOLUTION RESPIRATORY (INHALATION) at 06:02

## 2017-02-05 RX ADMIN — IPRATROPIUM BROMIDE AND ALBUTEROL SULFATE 3 ML: .5; 3 SOLUTION RESPIRATORY (INHALATION) at 12:02

## 2017-02-05 RX ADMIN — HYDROCHLOROTHIAZIDE 25 MG: 25 TABLET ORAL at 09:02

## 2017-02-05 RX ADMIN — VANCOMYCIN HYDROCHLORIDE 1000 MG: 1 INJECTION, POWDER, LYOPHILIZED, FOR SOLUTION INTRAVENOUS at 05:02

## 2017-02-05 RX ADMIN — PANTOPRAZOLE SODIUM 40 MG: 40 TABLET, DELAYED RELEASE ORAL at 09:02

## 2017-02-05 NOTE — PROGRESS NOTES
"Hernesto Sofia is a 89 y.o. male patient.looks great    Active Hospital Problems    Diagnosis  POA    *Atrial fibrillation with RVR [I48.91]  Yes    Pneumonia of left upper lobe due to infectious organism [J18.1]  Yes    Aortic stenosis [I35.0]  Yes    Leukocytosis [D72.829]  Yes    HLD (hyperlipidemia) [E78.5]  Yes    HTN (hypertension) [I10]  Yes    Chronic right-sided headaches [R51]  Yes    Acute confusional state [F05]  Yes    Cervical radicular pain [M54.12]  Yes      Resolved Hospital Problems    Diagnosis Date Resolved POA    Hypokalemia [E87.6] 02/04/2017 No     Temp: 98.4 °F (36.9 °C) (02/05/17 0733)  Pulse: 107 (02/05/17 0800)  Resp: 18 (02/05/17 0733)  BP: (!) 124/94 (02/05/17 0733)  SpO2: (!) 93 % (02/05/17 0626)  Weight: 72.9 kg (160 lb 11.5 oz) (01/30/17 1445)  Height: 5' 7" (170.2 cm) (01/30/17 1445)    Subjective:  Symptoms:  Worsening.  He reports shortness of breath, cough, chest pain, weakness and anxiety.    Diet:  Adequate intake.    Activity level: Impaired due to weakness.    Pain:  He complains of pain that is mild.      Objective:  General Appearance:  Ill-appearing.    Vital signs: (most recent): Blood pressure (!) 124/94, pulse 107, temperature 98.4 °F (36.9 °C), temperature source Oral, resp. rate 18, height 5' 7" (1.702 m), weight 72.9 kg (160 lb 11.5 oz), SpO2 (!) 93 %.  No fever.    Output: Producing urine and producing stool.    Lungs:  Normal respiratory rate.  He is not in respiratory distress.  No stridor.  There are decreased breath sounds.  No wheezes, rales or rhonchi.    Heart: Tachycardia.  Irregular rhythm.  Positive for murmur.  No gallop or friction rub.   Chest: No chest wall tenderness.  Symmetric chest wall expansion.   Extremities: Normal range of motion.  There is no venous stasis, deformity, effusion, local swelling or dependent edema.    Neurological: Patient is alert and oriented to person, place and time.  Normal strength.  Patient has normal reflexes, " normal muscle tone and normal coordination.    Skin:  Warm and dry.  No rash, cyanosis or ulceration.   Abdomen: Abdomen is soft, flat and non-distended.  There are no signs of ascites.  Hypoactive bowel sounds.   There is no abdominal tenderness.     Pupils:  Pupils are equal, round, and reactive to light.    Pulses: Distal pulses are intact.      Assessment:  (COPD exacerbation  Clinically  UTI  A Fib  fever    ).     Plan:   Had fever last night   Doing much better   WBC down.       Rodolfo Carranza MD  2/5/2017

## 2017-02-05 NOTE — NURSING
Received report from MADDIE Paige. Patient sitting in recliner watching TV. Patient voices no complaints.

## 2017-02-05 NOTE — SUBJECTIVE & OBJECTIVE
Interval History: no acute events overnight    Review of Systems   Constitutional: Negative for activity change, fatigue, fever and unexpected weight change.   HENT: Negative for congestion, ear pain, hearing loss, rhinorrhea and sore throat.    Eyes: Negative for pain, redness and visual disturbance.   Respiratory: Positive for cough (getting better). Negative for shortness of breath and wheezing.    Cardiovascular: Negative for chest pain, palpitations and leg swelling.   Gastrointestinal: Negative for abdominal pain, constipation, diarrhea, nausea and vomiting.   Genitourinary: Negative for decreased urine volume, dysuria, frequency and urgency.   Musculoskeletal: Negative for back pain, joint swelling and neck pain.   Skin: Negative for color change, rash and wound.   Neurological: Negative for dizziness, weakness, light-headedness and headaches.     Objective:     Vital Signs (Most Recent):  Temp: 98.4 °F (36.9 °C) (02/05/17 0733)  Pulse: 107 (02/05/17 0800)  Resp: 18 (02/05/17 0733)  BP: (!) 124/94 (02/05/17 0733)  SpO2: (!) 93 % (02/05/17 0626) Vital Signs (24h Range):  Temp:  [97.1 °F (36.2 °C)-100 °F (37.8 °C)] 98.4 °F (36.9 °C)  Pulse:  [] 107  Resp:  [18-20] 18  SpO2:  [93 %-99 %] 93 %  BP: ()/(50-94) 124/94     Weight: 72.9 kg (160 lb 11.5 oz)  Body mass index is 25.17 kg/(m^2).    Intake/Output Summary (Last 24 hours) at 02/05/17 0922  Last data filed at 02/05/17 0000   Gross per 24 hour   Intake              720 ml   Output                0 ml   Net              720 ml      Physical Exam   Constitutional: He is oriented to person, place, and time. He appears well-developed and well-nourished. No distress.   HENT:   Head: Normocephalic and atraumatic.   Right Ear: External ear normal.   Left Ear: External ear normal.   Eyes: Conjunctivae and EOM are normal. Pupils are equal, round, and reactive to light. Right eye exhibits no discharge. Left eye exhibits no discharge.   Neck: Neck supple. No  tracheal deviation present.   Cardiovascular: Normal rate and regular rhythm.  Exam reveals no gallop and no friction rub.    No murmur heard.  Pulmonary/Chest: Effort normal and breath sounds normal. No respiratory distress. He has no wheezes. He has no rales.   Abdominal: Soft. Bowel sounds are normal. He exhibits no distension. There is no tenderness.   Musculoskeletal: He exhibits no edema.   Lymphadenopathy:     He has no cervical adenopathy.   Neurological: He is alert and oriented to person, place, and time. No cranial nerve deficit.   Skin: Skin is warm and dry.   Psychiatric: He has a normal mood and affect. His behavior is normal.   Nursing note and vitals reviewed.      Significant Labs:   CBC:   Recent Labs  Lab 02/05/17  0450   WBC 10.39   HGB 12.8*   HCT 36.4*        CMP:   Recent Labs  Lab 02/04/17  0441 02/05/17  0450    137   K 3.7 3.8    103   CO2 25 24   GLU 98 105   BUN 17 17   CREATININE 0.8 0.8   CALCIUM 9.0 9.0   ANIONGAP 12 10   EGFRNONAA >60 >60     All pertinent labs within the past 24 hours have been reviewed.    Significant Imaging: I have reviewed all pertinent imaging results/findings within the past 24 hours.

## 2017-02-05 NOTE — NURSING
Report received. Patient on [facility]. Assisted patient back to bed from [facility]. Addressed all questions and concerns. Call bell within reach. Patient instructed to call with needs.

## 2017-02-05 NOTE — DISCHARGE SUMMARY
"[facility] Medicine  Discharge Summary      Patient Name: Hernesto Sofia  MRN: [id number]  Admission Date: 1/30/2017  Hospital Length of Stay: 5 days  Discharge Date and Time: 2/5/2017 9:32 AM  Attending Physician: Roxanne Pandey MD   Discharging Provider: Roxanne Pandey MD  Primary Care Provider: Hasmukh Pederson MD      HPI:   Patient presented to ER with confusion and shortness of breath. Was brought in by his daughter who is not at the bedside this evening. Patient reports he was having an "out of body experience" and that's why he was brought here. Doesn't give much more detail than this. He denies SOB, chest pains, palpitations, LE edema, orthopnea. Denies dysuria, hematuria but does report frequency and incomplete bladder emptying. He denies abdominal pain, n/v/d/c. He is alert and oriented this evening but is a poor historian.     * No surgery found *      Indwelling Lines/Drains at time of discharge:   Lines/Drains/Airways          No matching active lines, drains, or airways        Hospital Course:   Was found to bed in a-[facility] with RVR with rate low 100s. CXR was normal, no consolidation or pulmonary edema. CIS and pulmonary consulted to assist with management. CT head was negative, neurology consulted in ED and planned for MRI brain but he has pacemaker. Was to start on dilt gtt but HR low 100s and BP on the low side so managing with IV metoprolol.      Since metoprolol his HR is around 66-98.     He is on Zithromax and ceftriaxone x 3 days on Wed. Had low grade fever Tuesday night up to 100.6. Flu test added, neg. On admit, mild uti noted. Spiked 102.7 yesterday after CT dye extravasation in his arm. A/b changed to Meropenem and Vanc. Repeat u/a and CXR ordered today. CXR reviewed, new left retrocardiac opacity noted. Patient says he is feeling great.    No fevers, leukocytosis since adding antibx. Denies SOB, chest pain, LE edema. Changed from Marily and Vanc to " Levaquin on 2/3/17 and now with low grade fevers again to 99.9 overnight. He denies feeling SOB, coughing.     2/5:  Doing great this AM. Breathing is back to baseline. Still coughing but better. No fevers last 12 hours. No leukocytosis.       Consults:   Consults         Status Ordering Provider     Inpatient consult to Cardiology-CIS  Once     Provider:  Abel Covarrubias MD    Completed AIMEE JARRETT     Inpatient consult to Neurology  Once     Provider:  Garrison Arizmendi MD    Completed AIMEE JARRETT     Inpatient consult to Pulmonology  Once     Provider:  Rodolfo Carranza MD    Completed AIMEE JARRETT     Nutrition Services Referral  Once     Provider:  (Not yet assigned)    RHYS Ortega          Significant Diagnostic Studies: Labs:   CMP   Recent Labs  Lab 02/04/17  0441 02/05/17  0450    137   K 3.7 3.8    103   CO2 25 24   GLU 98 105   BUN 17 17   CREATININE 0.8 0.8   CALCIUM 9.0 9.0   ANIONGAP 12 10   ESTGFRAFRICA >60 >60   EGFRNONAA >60 >60    and CBC   Recent Labs  Lab 02/05/17  0450   WBC 10.39   HGB 12.8*   HCT 36.4*          Pending Diagnostic Studies:     None        Final Active Diagnoses:    Diagnosis Date Noted POA    PRINCIPAL PROBLEM:  Atrial fibrillation with RVR [I48.91] 01/30/2017 Yes    Pneumonia of left upper lobe due to infectious organism [J18.1] 02/02/2017 Yes    Aortic stenosis [I35.0] 02/01/2017 Yes    Leukocytosis [D72.829] 01/30/2017 Yes    HLD (hyperlipidemia) [E78.5] 01/30/2017 Yes    HTN (hypertension) [I10] 01/30/2017 Yes    Chronic right-sided headaches [R51] 01/30/2017 Yes    Acute confusional state [F05] 01/30/2017 Yes    Cervical radicular pain [M54.12] 01/30/2017 Yes      Problems Resolved During this Admission:    Diagnosis Date Noted Date Resolved POA    Hypokalemia [E87.6] 02/03/2017 02/04/2017 No      * Atrial fibrillation with RVR  Was on coumadin as outpatient, INR 3.3 wed-decreased dose to 1 mg q hs;   INR 1.7,  "just below goal ; likely due to levaquin  Daily INR. Home dose 5mg Mon, Wed, Fri and 2.5mg Tues, Thurs, Sat and Sun; watch closely on antibx--will send home with 5mg dialy for now and needs INR checked Tuesday  On sotalol, will continue-now rate controlled.   Was not started on dilt gtt due to BP  IV metoprolol PRN for HR > 110;   TTE ordered-reviewed; AS mod  CIS following    Remains in NSR with good rate. Well controlled    Leukocytosis  WBC 21K on admission>>14>>11>>9; 10K today  UA minimal, CXR is negative on admission  Blood cx NGTD  Started azithro and rocephin as wheezing and having some SOB on admission  CXR Tuesday with small effusion; d/c ivf  Tmax 2/1/17102.7-Check flu (neg)  Antibiotics changed to Meropenem and Vanc on 2/1/17pm - WBC ct improving. Will watch fever curve. Now with concern for PNA based on CXR findings  De-escalate to levaquini 2/3 (normal renal function but will likely influence his INR) but fever curve is trending up again (tmax 99.9). I will monitor for now and if temp trending up will have to broaden. ---fevers are now resolved. Feeling great! Will send home with PO levaqin for 10 days total (6 more days)    HLD (hyperlipidemia)  Continue home atorvastatin      HTN (hypertension)  On HCTZ prior to admission, restarted      Chronic right-sided headaches  This seems to be a chronic issue for him  CT head negative  ESR OK--less likely temporal arteritis, not tender on exam  Can't do MRI due to PPM  Neurology was consulted in ED-Trigeminal neuralgia vs cervical radicular pain in differential; ? Start low dose gabapentin for proph  Tylenol PRN for pain for now  EEG pending    Acute confusional state  Was brought to ER for this reason per ER records  Patient reports "out of body experience" but can't elaborate further  Now alert and oriented to person, place, time  Neurology consulted-? Related to infection; now improved  ?? metabolic, infection vs cardiac with RVR    Normal mental status at " discharge today      Cervical radicular pain  Trigeminal neuralgia vs cervical radicular pain in differential  CTA head and neck was attempted on Wed, IV infiltrated with contrast, pain better, did not do further imaging  Dr. Arizmendi following. Can follow up as outpatient for this      Aortic stenosis  Cards following  Not retaining fluid      Pneumonia of left upper lobe due to infectious organism  Continue current plan and treat with merem and vanc.  Monitoring fever trend and de-escalate to levaquin 2/3  No fevers last 24 hours. Send home with PO levaquin--6 more days for 10 days total        Discharged Condition: good    Disposition:     Follow Up:  Follow-up Information     Follow up with Hasmukh Pederson MD.    Specialty:  Family Medicine    Why:  Outpatient Services    Contact information:    [address] [address]  [phone]          Follow up with [facility].    Specialty:  Home Health Services    Contact information:    [address] [facility] [address]  [phone]          Follow up with Hasmukh Pederson MD In 3 days.    Specialty:  Family Medicine    Contact information:    [address] [address]  [phone]          Patient Instructions:     Protime-INR   Standing Status: Future  Standing Exp. Date: 02/05/18       Medications:  Reconciled Home Medications:   Current Discharge Medication List      START taking these medications    Details   hydrocortisone (ANUSOL-HC) 2.5 % rectal cream Place rectally 2 (two) times daily.  Qty: 30 g, Refills: 0      levoFLOXacin (LEVAQUIN) 750 MG tablet Take 1 tablet (750 mg total) by mouth once daily.  Qty: 6 tablet, Refills: 0         CONTINUE these medications which have NOT CHANGED    Details   atorvastatin (LIPITOR) 40 MG tablet Take 40 mg by mouth once daily.      hydrochlorothiazide (HYDRODIURIL) 25 MG tablet Take 25 mg by mouth. Take one every four days      multivitamin (ONE DAILY MULTIVITAMIN) per tablet Take 1  tablet by mouth once daily.      sotalol (BETAPACE) 80 MG tablet Take 40 mg by mouth 2 (two) times daily.      warfarin (COUMADIN) 5 MG tablet Take 5 mg by mouth Daily. Take 5 mg Mondays, Wednesdays, Fridays.  Take 2.5mg on Tuesdays, Thursdays, Saturdays, Sundays      lorazepam (ATIVAN) 1 MG tablet Take 0.5 tablets (0.5 mg total) by mouth every 8 (eight) hours as needed for Anxiety.  Qty: 10 tablet, Refills: 0           Time spent on the discharge of patient: 30 minutes    Roxanne Pandey MD  Department of Hospital Medicine  [facility]

## 2017-02-05 NOTE — PROGRESS NOTES
"[facility] Medicine  Progress Note    Patient Name: Hernesto Sofia  MRN: [id number]  Patient Class: IP- Inpatient   Admission Date: 1/30/2017  Length of Stay: 5 days  Attending Physician: Roxanne Pandey MD  Primary Care Provider: Hasmukh Pederson MD        Subjective:     Principal Problem:Atrial fibrillation with RVR    HPI:  Patient presented to ER with confusion and shortness of breath. Was brought in by his daughter who is not at the bedside this evening. Patient reports he was having an "out of body experience" and that's why he was brought here. Doesn't give much more detail than this. He denies SOB, chest pains, palpitations, LE edema, orthopnea. Denies dysuria, hematuria but does report frequency and incomplete bladder emptying. He denies abdominal pain, n/v/d/c. He is alert and oriented this evening but is a poor historian.     Hospital Course:  Was found to bed in a-[facility] with RVR with rate low 100s. CXR was normal, no consolidation or pulmonary edema. CIS and pulmonary consulted to assist with management. CT head was negative, neurology consulted in ED and planned for MRI brain but he has pacemaker. Was to start on dilt gtt but HR low 100s and BP on the low side so managing with IV metoprolol.      Since metoprolol his HR is around 66-98.     He is on Zithromax and ceftriaxone x 3 days on Wed. Had low grade fever Tuesday night up to 100.6. Flu test added, neg. On admit, mild uti noted. Spiked 102.7 yesterday after CT dye extravasation in his arm. A/b changed to Meropenem and Vanc. Repeat u/a and CXR ordered today. CXR reviewed, new left retrocardiac opacity noted. Patient says he is feeling great.    No fevers, leukocytosis since adding antibx. Denies SOB, chest pain, LE edema. Changed from Marily and Vanc to Levaquin on 2/3/17 and now with low grade fevers again to 99.9 overnight. He denies feeling SOB, coughing.     2/5:  Doing great this AM. Breathing is back to " baseline. Still coughing but better. No fevers last 12 hours. No leukocytosis.      Interval History: no acute events overnight    Review of Systems   Constitutional: Negative for activity change, fatigue, fever and unexpected weight change.   HENT: Negative for congestion, ear pain, hearing loss, rhinorrhea and sore throat.    Eyes: Negative for pain, redness and visual disturbance.   Respiratory: Positive for cough (getting better). Negative for shortness of breath and wheezing.    Cardiovascular: Negative for chest pain, palpitations and leg swelling.   Gastrointestinal: Negative for abdominal pain, constipation, diarrhea, nausea and vomiting.   Genitourinary: Negative for decreased urine volume, dysuria, frequency and urgency.   Musculoskeletal: Negative for back pain, joint swelling and neck pain.   Skin: Negative for color change, rash and wound.   Neurological: Negative for dizziness, weakness, light-headedness and headaches.     Objective:     Vital Signs (Most Recent):  Temp: 98.4 °F (36.9 °C) (02/05/17 0733)  Pulse: 107 (02/05/17 0800)  Resp: 18 (02/05/17 0733)  BP: (!) 124/94 (02/05/17 0733)  SpO2: (!) 93 % (02/05/17 0626) Vital Signs (24h Range):  Temp:  [97.1 °F (36.2 °C)-100 °F (37.8 °C)] 98.4 °F (36.9 °C)  Pulse:  [] 107  Resp:  [18-20] 18  SpO2:  [93 %-99 %] 93 %  BP: ()/(50-94) 124/94     Weight: 72.9 kg (160 lb 11.5 oz)  Body mass index is 25.17 kg/(m^2).    Intake/Output Summary (Last 24 hours) at 02/05/17 0922  Last data filed at 02/05/17 0000   Gross per 24 hour   Intake              720 ml   Output                0 ml   Net              720 ml      Physical Exam   Constitutional: He is oriented to person, place, and time. He appears well-developed and well-nourished. No distress.   HENT:   Head: Normocephalic and atraumatic.   Right Ear: External ear normal.   Left Ear: External ear normal.   Eyes: Conjunctivae and EOM are normal. Pupils are equal, round, and reactive to light.  Right eye exhibits no discharge. Left eye exhibits no discharge.   Neck: Neck supple. No tracheal deviation present.   Cardiovascular: Normal rate and regular rhythm.  Exam reveals no gallop and no friction rub.    No murmur heard.  Pulmonary/Chest: Effort normal and breath sounds normal. No respiratory distress. He has no wheezes. He has no rales.   Abdominal: Soft. Bowel sounds are normal. He exhibits no distension. There is no tenderness.   Musculoskeletal: He exhibits no edema.   Lymphadenopathy:     He has no cervical adenopathy.   Neurological: He is alert and oriented to person, place, and time. No cranial nerve deficit.   Skin: Skin is warm and dry.   Psychiatric: He has a normal mood and affect. His behavior is normal.   Nursing note and vitals reviewed.      Significant Labs:   CBC:   Recent Labs  Lab 02/05/17  0450   WBC 10.39   HGB 12.8*   HCT 36.4*        CMP:   Recent Labs  Lab 02/04/17  0441 02/05/17  0450    137   K 3.7 3.8    103   CO2 25 24   GLU 98 105   BUN 17 17   CREATININE 0.8 0.8   CALCIUM 9.0 9.0   ANIONGAP 12 10   EGFRNONAA >60 >60     All pertinent labs within the past 24 hours have been reviewed.    Significant Imaging: I have reviewed all pertinent imaging results/findings within the past 24 hours.    Assessment/Plan:      * Atrial fibrillation with RVR  Was on coumadin as outpatient, INR 3.3 wed-decreased dose to 1 mg q hs;   INR 1.7, just below goal ; likely due to levaquin  Daily INR. Home dose 5mg Mon, Wed, Fri and 2.5mg Tues, Thurs, Sat and Sun; watch closely on antibx--will send home with 5mg dialy for now and needs INR checked Tuesday  On sotalol, will continue-now rate controlled.   Was not started on dilt gtt due to BP  IV metoprolol PRN for HR > 110;   TTE ordered-reviewed; AS mod  CIS following    Remains in NSR with good rate. Well controlled    Leukocytosis  WBC 21K on admission>>14>>11>>9; 10K today  UA minimal, CXR is negative on admission  Blood cx  "NGTD  Started azithro and rocephin as wheezing and having some SOB on admission  CXR Tuesday with small effusion; d/c ivf  Tmax 2/1/17102.7-Check flu (neg)  Antibiotics changed to Meropenem and Vanc on 2/1/17pm - WBC ct improving. Will watch fever curve. Now with concern for PNA based on CXR findings  De-escalate to levaquini 2/3 (normal renal function but will likely influence his INR) but fever curve is trending up again (tmax 99.9). I will monitor for now and if temp trending up will have to broaden. ---fevers are now resolved. Feeling great! Will send home with PO levaqin for 10 days total (6 more days)    HLD (hyperlipidemia)  Continue home atorvastatin      HTN (hypertension)  On HCTZ prior to admission, restarted      Chronic right-sided headaches  This seems to be a chronic issue for him  CT head negative  ESR OK--less likely temporal arteritis, not tender on exam  Can't do MRI due to PPM  Neurology was consulted in ED-Trigeminal neuralgia vs cervical radicular pain in differential; ? Start low dose gabapentin for proph  Tylenol PRN for pain for now  EEG pending    Acute confusional state  Was brought to ER for this reason per ER records  Patient reports "out of body experience" but can't elaborate further  Now alert and oriented to person, place, time  Neurology consulted-? Related to infection; now improved  ?? metabolic, infection vs cardiac with RVR    Normal mental status at discharge today      Cervical radicular pain  Trigeminal neuralgia vs cervical radicular pain in differential  CTA head and neck was attempted on Wed, IV infiltrated with contrast, pain better, did not do further imaging  Dr. Arizmendi following. Can follow up as outpatient for this      Aortic stenosis  Cards following  Not retaining fluid      Pneumonia of left upper lobe due to infectious organism  Continue current plan and treat with merem and vanc.  Monitoring fever trend and de-escalate to levaquin 2/3  No fevers last 24 " hours. Send home with PO levaquin--6 more days for 10 days total      VTE Risk Mitigation         Ordered     Medium Risk of VTE  Once      01/30/17 1514     Place sequential compression device  Until discontinued      01/30/17 1514          Roxanne Pandey MD  Department of Hospital Medicine   [facility]

## 2017-02-05 NOTE — ASSESSMENT & PLAN NOTE
Was on coumadin as outpatient, INR 3.3 wed-decreased dose to 1 mg q hs;   INR 1.7, just below goal ; likely due to levaquin  Daily INR. Home dose 5mg Mon, Wed, Fri and 2.5mg Tues, Thurs, Sat and Sun; watch closely on antibx--will send home with 5mg dialy for now and needs INR checked Tuesday  On sotalol, will continue-now rate controlled.   Was not started on dilt gtt due to BP  IV metoprolol PRN for HR > 110;   TTE ordered-reviewed; AS mod  CIS following    Remains in NSR with good rate. Well controlled

## 2017-02-05 NOTE — ASSESSMENT & PLAN NOTE
Continue current plan and treat with laura and haley.  Monitoring fever trend and de-escalate to levaquin 2/3  No fevers last 24 hours. Send home with PO levaquin--6 more days for 10 days total

## 2017-02-05 NOTE — DISCHARGE INSTRUCTIONS
Call Dr Arizmendi's clinic at [phone] for any worsening head or neck pain.     Please follow-up with Dr. Carranza at his clinic on Monday, 2/13/17. No appointment needed. Just show up anytime between 7:30AM and 9:00AM

## 2017-02-05 NOTE — ASSESSMENT & PLAN NOTE
Trigeminal neuralgia vs cervical radicular pain in differential  CTA head and neck was attempted on Wed, IV infiltrated with contrast, pain better, did not do further imaging  Dr. Arizmendi following. Can follow up as outpatient for this

## 2017-02-05 NOTE — PLAN OF CARE
Problem: Patient Care Overview  Goal: Plan of Care Review  Outcome: Ongoing (interventions implemented as appropriate)  Fall precautions maintained. Patient remains free from falls/injuries. IV ABX administered. Patient ambulated in room with physical therapy. Patient sat up in chair after physical therapy until time of discharge. Discharge instructions reviewed with patient and daughter, and a printed copy was given to them to take home; both understand home care instructions including medications, follow-ups, and when to seek additional medical attention. Patient stable at time of discharge.

## 2017-02-05 NOTE — PT/OT/SLP PROGRESS
Physical Therapy  Treatment    Hernesto Sofia   MRN: [id number]   Admitting Diagnosis: Atrial fibrillation with RVR    PT Received On: 17  PT Start Time: 0745     PT Stop Time: 0810    PT Total Time (min): 25 min       Billable Minutes:  Gait [id number] min and Therapeutic Exercise 10 min    Treatment Type: Treatment  PT/PTA: PT             General Precautions: Standard, fall  Orthopedic Precautions: N/A   Braces:      Do you have any cultural, spiritual, Anabaptism conflicts, given your current situation?: none voiced    Subjective:  Communicated with patient and RN prior to session.  Would like to be able to go home    Pain Ratin/10              Pain Rating Post-Intervention: 0/10    Objective:   Patient found with:  (no lines)    Functional Mobility:  Bed Mobility:   Rolling/Turning to Left: Stand by assistance  Rolling/Turning Right: Stand by assistance  Scooting/Bridging: Stand by Assistance  Supine to Sit: Stand by Assistance  Sit to Supine: Stand by Assistance    Transfers:  Sit <> Stand Assistance: Stand By Assistance  Sit <> Stand Assistive Device: Rolling Walker  Bed <> Chair Technique: Stand Pivot  Bed <> Chair Assistance: Contact Guard Assistance  Bed <> Chair Assistive Device: Rolling Walker    Gait : 150 ft with Rolling walker:       Stairs:      Balance:   Static Sit: NORMAL: No deviations seen in posture held statically  Dynamic Sit: GOOD+: Maintains balance through MAXIMAL excursions of active trunk motion  Static Stand: GOOD-: Takes MODERATE challenges from all directions inconsistently  Dynamic stand: FAIR+: Needs CLOSE SUPERVISION during gait and is able to right self with minor LOB     Therapeutic Activities and Exercises:  Multiple repetitive therapeutic and assistive exercises to increase strength and function of all four extremities, trunk,  and ability to walk.     AM-PAC 6 CLICK MOBILITY  How much help from another person does this patient currently need?   1 = Unable,  Total/Dependent Assistance  2 = A lot, Maximum/Moderate Assistance  3 = A little, Minimum/Contact Guard/Supervision  4 = None, Modified Bureau/Independent    Turning over in bed (including adjusting bedclothes, sheets and blankets)?: 4  Sitting down on and standing up from a chair with arms (e.g., wheelchair, bedside commode, etc.): 3  Moving from lying on back to sitting on the side of the bed?: 3  Moving to and from a bed to a chair (including a wheelchair)?: 3  Need to walk in hospital room?: 3  Climbing 3-5 steps with a railing?: 1  Total Score: 17    AM-PAC Raw Score CMS G-Code Modifier Level of Impairment Assistance   6 % Total / Unable   7 - 9 CM 80 - 100% Maximal Assist   10 - 14 CL 60 - 80% Moderate Assist   15 - 19 CK 40 - 60% Moderate Assist   20 - 22 CJ 20 - 40% Minimal Assist   23 CI 1-20% SBA / CGA   24 CH 0% Independent/ Mod I     Patient left up in chair with call button in reach and RN staff notified.    Assessment:  Hernesto Sofia is a 89 y.o. male with a medical diagnosis of Atrial fibrillation with RVR and presents with generalized weakness.    Rehab identified problem list/impairments: Rehab identified problem list/impairments: impaired endurance, impaired self care skills, impaired functional mobilty, gait instability, decreased safety awareness    Rehab potential is good.    Activity tolerance: Good    Discharge recommendations: Discharge Facility/Level Of Care Needs: home with home health     Barriers to discharge: Barriers to Discharge: None    Equipment recommendations: Equipment Needed After Discharge: none     GOALS:   Physical Therapy Goals        Problem: Physical Therapy Goal    Goal Priority Disciplines Outcome Goal Variances Interventions   Physical Therapy Goal     PT/OT, PT      Description:  Goals to be met by: 2/10/2017     Patient will increase functional independence with mobility by performin. Supine to sit with Stand-by Assistance  2. Sit to supine with  Stand-by Assistance  3. Rolling to Left and Right with Stand-by Assistance.  4. Sit to stand transfer with Stand-by Assistance  5. Bed to chair transfer with Stand-by Assistance using Rolling Walker  6. Gait  x 150 feet with Stand-by Assistance using Rolling Walker.                 PLAN:    Patient to be seen daily  to address the above listed problems via gait training, therapeutic activities, therapeutic exercises  Plan of Care expires:  (Upon D/C from facility)  Plan of Care reviewed with: patient         Johny Mercedes, PT  02/05/2017

## 2017-02-05 NOTE — ASSESSMENT & PLAN NOTE
WBC 21K on admission>>14>>11>>9; 10K today  UA minimal, CXR is negative on admission  Blood cx NGTD  Started azithro and rocephin as wheezing and having some SOB on admission  CXR Tuesday with small effusion; d/c ivf  Tmax 2/1/17102.7-Check flu (neg)  Antibiotics changed to Meropenem and Vanc on 2/1/17pm - WBC ct improving. Will watch fever curve. Now with concern for PNA based on CXR findings  De-escalate to levaquini 2/3 (normal renal function but will likely influence his INR) but fever curve is trending up again (tmax 99.9). I will monitor for now and if temp trending up will have to broaden. ---fevers are now resolved. Feeling great! Will send home with PO levaqin for 10 days total (6 more days)

## 2017-02-05 NOTE — PLAN OF CARE
Problem: Patient Care Overview  Goal: Plan of Care Review  Outcome: Ongoing (interventions implemented as appropriate)  Patient denies shortness of breath;  Breath sounds diminished; 02 saturation 97% on room air. IV site red  and swollen; new IV now on left arm. Patient had low grade temperature again (100.0 orally). Receiving IV antibiotics. Up to bedside commode; with stand-by assist;  incontinent of urine . Refuses to wear SCD's.  A-Fib on telemetry. Fall precautions maintained. Bed locked and low;bed exit alarm in use. Call bell in reach. Plan of care reviewed with patient; agrees with plan.

## 2017-02-05 NOTE — ASSESSMENT & PLAN NOTE
"Was brought to ER for this reason per ER records  Patient reports "out of body experience" but can't elaborate further  Now alert and oriented to person, place, time  Neurology consulted-? Related to infection; now improved  ?? metabolic, infection vs cardiac with RVR    Normal mental status at discharge today    "

## 2017-02-06 NOTE — PHYSICIAN QUERY
"PT Name: Hernesto Sofia  MR #: [id number]    Physician Query Form - Pneumonia Clarification    Reviewer  Austin Henry RN, CDI Ext [phone]    This form is a permanent document in the medical record.    Query Date:  February 6, 2017    By submitting this query, we are merely seeking further clarification of documentation. Please utilize your independent clinical judgment when addressing the question(s) below.  (The Medical record reflects the following:)   Indicators   Supporting Clinical Findings Location in Medical Record   X "Pneumonia" documented Pneumonia of left upper lobe due to infectious organism   Progress Note: 2/2   X Chest X-Ray: CXR reviewed, new left retrocardiac opacity noted   Progress Note: 2/2    PaO2=   PaCO2=    O2 sat=      Cultures :     X Treatment : on Zithromax and ceftriaxone x 3 days    Continue current plan and treat with merem and vanc    Changed from Marily and Vanc to Levaquin on 2/3/17   Progress Note: 2/2            DC Summary: 2/5    Supplemental O2:      Other     Provider, please specify type of pneumonia.    [ X ] Bacterial (Specify organism) PNA   Organism unknown, no culture     [  ] Bacterial, Gram Negative organism PNA        [  ] Viral (Specify virus) PNA       [  ] Fungal (Specify organism) PNA       [  ] Aspiration PNA        [  ] Other type of pneumonia (Specify)        [ ] Clinically undetermined    Please document in your progress notes daily for the duration of treatment, until resolved, and include in your discharge summary.                                                                                    "

## 2017-02-06 NOTE — PT/OT/SLP DISCHARGE
Occupational Therapy Discharge Summary    Hernesto Sofia  MRN: [id number]   Atrial fibrillation with RVR   Patient Discharged from acute Occupational Therapy on 2/6/2017.  Please refer to prior OT note dated on 2/3/2017 for functional status.     Assessment:   Patient was discharge unexpectedly.  Information required to complete and accurate discharge summary is unknown.  Refer to therapy initial evaluation and last progress note for initial and most recent functional status and goal achievement.  Recommendations made may be found in medical record.  GOALS:   Occupational Therapy Goals     Not on file      Multidisciplinary Problems (Resolved)        Problem: Occupational Therapy Goal    Goal Priority Disciplines Outcome Interventions   Occupational Therapy Goal   (Resolved)     OT, PT/OT Outcome(s) achieved    Description:  Patient will increase functional independence with ADLs by performing:    UE Dressing with Set-up Assistance.  LE Dressing with Minimal Assistance.  Grooming to be assessed   Toileting from bedside commode with Supervision for hygiene and clothing management.               Reasons for Discontinuation of Therapy Services  Discharged from hospital      Plan:  Patient Discharged to: Home with Home Health Service.    LUIS ALBERTO Ty

## 2017-02-06 NOTE — PLAN OF CARE
02/06/17 0800   Final Note   Assessment Type Final Discharge Note   Discharge Disposition Home-Health   Discharge planning education complete? Yes   Hospital Follow Up  Appt(s) scheduled? Yes   Discharge plans and expectations educations in teach back method with documentation complete? Yes   Offered [facility] -- Bedside Delivery? n/a   Discharge/Hospital Encounter Summary to (non[facility]) PCP n/a   Referral to Outpatient Case Management complete? n/a   Referral to / orders for Home Health Complete? Yes   30 day supply of medicines given at discharge, if documented non-compliance / non-adherence? n/a   Any social issues identified prior to discharge? n/a   Did you assess the readiness or willingness of the family or caregiver to support self management of care? n/a   Right Care Referral Info   Post Acute Recommendation Home-care   Facility Name [facility]

## 2017-02-06 NOTE — PT/OT/SLP DISCHARGE
Physical Therapy Discharge Summary    Hernesto Sofia  MRN: [id number]   Atrial fibrillation with RVR   Patient Discharged from acute Physical Therapy on 2017.  Please refer to prior PT noted date on 2017 for functional status.     Assessment:   Patient was discharge unexpectedly.  Information required to complete and accurate discharge summary is unknown.  Refer to therapy initial evaluation and last progress note for initial and most recent functional status and goal achievement.  Recommendations made may be found in medical record. Goals partially met.  GOALS:   Physical Therapy Goals     Not on file      Multidisciplinary Problems (Resolved)        Problem: Physical Therapy Goal    Goal Priority Disciplines Outcome Goal Variances Interventions   Physical Therapy Goal   (Resolved)     PT/OT, PT Outcome(s) achieved     Description:  Goals to be met by: 2/10/2017     Patient will increase functional independence with mobility by performin. Supine to sit with Stand-by Assistance -- GOAL MET  2. Sit to supine with Stand-by Assistance -- GOAL MET  3. Rolling to Left and Right with Stand-by Assistance. -- GOAL MET  4. Sit to stand transfer with Stand-by Assistance -- NOT MET  5. Bed to chair transfer with Stand-by Assistance using Rolling Walker -- NOT MET  6. Gait  x 150 feet with Stand-by Assistance using Rolling Walker. -- NOT MET               Reasons for Discontinuation of Therapy Services  Transfer to alternate level of care.      Plan:  Patient Discharged to: Home with Home Health Service.

## 2017-02-08 ENCOUNTER — TELEPHONE (OUTPATIENT)
Dept: FAMILY MEDICINE | Facility: CLINIC | Age: 82
End: 2017-02-08

## 2017-02-08 NOTE — TELEPHONE ENCOUNTER
----- Message from Rafaela Leos MA sent at 2017  2:50 PM CST -----  Contact: Andrea  Hernesto Sofia  MRN: [id number]  : 1927  PCP: Hasmukh Pederson  Home Phone      [phone]  Work Phone      Not on file.  Mobile          [phone]      MESSAGE: Please call Pollo, he has questions about this patient's PT.  His phone # [phone]

## 2017-02-13 NOTE — PHYSICIAN QUERY
PT Name: Hernesto Sofia  MR #: [id number]    Physician Query Form - Consultant Diagnosis Clarification     Reviewer Austin Henry RN, CDI  Ext [phone]  This form is a permanent document in the medical record.     Query Date: February 13, 2017    Dear Provider,   By submitting this query, we are merely seeking further clarification of documentation.  Please utilize your independent clinical judgment when addressing the question(s) below.      The Medical record reflects the following:   Diagnosis Supporting Clinical Information Location in Medical Record   Sepsis                           Sepsis shock    SIRS   in icu for wheezing and leukocytosis and low BP    Leukocytosis  WBC 21K on admission  Source is unclear  UA is clear, CXR is negative    Started azithro and rocephin as wheezing and having some SOB now---but is this infection vs cardiac due to RVR??  BP low normal, was giving IVF but will stop for now Pulm Consult: 1/30    H & P: 1/30          Progress Note: 1/31            Pulm Progress Note: 1/31    Pulm Progress Note: 2/1         Do you agree with the Consultants diagnosis of _________Sepsis ____________?                 [   ]   Yes               [   ]   Yes, but it resolved prior to my assessment of the patient               [   ]   No                [   ]   Clinically insignificant               [   ]   Clinically undetermined               [   ]   Other/Clarification of findings: ___________________________________________         Do you agree with the Consultants diagnosis of _____Sepsis shock_________?                 [   ]   Yes               [   ]   Yes, but it resolved prior to my assessment of the patient               [  X ]   No ---patient never required pressor support. Never had shock!               [   ]   Clinically insignificant               [   ]   Clinically undetermined               [   ]   Other/Clarification of findings: ___________________________________________        Do you agree  with the Consultants diagnosis of __SIRS______?                 [ X  ]   Yes               [   ]   Yes, but it resolved prior to my assessment of the patient               [   ]   No                [   ]   Clinically insignificant               [   ]   Clinically undetermined               [   ]   Other/Clarification of findings: ___________________________________________

## 2017-02-17 ENCOUNTER — OFFICE VISIT (OUTPATIENT)
Dept: NEUROLOGY | Facility: CLINIC | Age: 82
End: 2017-02-17
Payer: MEDICARE

## 2017-02-17 VITALS
SYSTOLIC BLOOD PRESSURE: 120 MMHG | DIASTOLIC BLOOD PRESSURE: 60 MMHG | RESPIRATION RATE: 20 BRPM | BODY MASS INDEX: 24.71 KG/M2 | HEIGHT: 67 IN | HEART RATE: 76 BPM | WEIGHT: 157.44 LBS

## 2017-02-17 DIAGNOSIS — R41.3 MEMORY LOSS: Primary | ICD-10-CM

## 2017-02-17 PROCEDURE — 99213 OFFICE O/P EST LOW 20 MIN: CPT | Mod: S$GLB,,, | Performed by: PSYCHIATRY & NEUROLOGY

## 2017-02-17 PROCEDURE — 99999 PR PBB SHADOW E&M-EST. PATIENT-LVL III: CPT | Mod: PBBFAC,,, | Performed by: PSYCHIATRY & NEUROLOGY

## 2017-02-17 NOTE — MR AVS SNAPSHOT
[facility]  [address] [address]  Phone: [phone]  Fax: [phone]                  Hernesto Sofia   2017 11:00 AM   Office Visit    Description:  Male : 1927   Provider:  Garrison Arizmendi MD   Department:  [facility]           Reason for Visit     Hospital Follow Up           Diagnoses this Visit        Comments    Memory loss    -  Primary            To Do List           Goals (5 Years of Data)     None      Follow-Up and Disposition     Return in about 6 months (around 2017).      [facility]     [facility] -  Assistance  Registered nurses in the [facility] provide clinical advisement, health education, appointment booking, and other advisory services.  Call for this free service at [phone].             Medications           Message regarding Medications     Verify the changes and/or additions to your medication regime listed below are the same as discussed with your clinician today.  If any of these changes or additions are incorrect, please notify your healthcare provider.        STOP taking these medications     lorazepam (ATIVAN) 1 MG tablet Take 0.5 tablets (0.5 mg total) by mouth every 8 (eight) hours as needed for Anxiety.           Verify that the below list of medications is an accurate representation of the medications you are currently taking.  If none reported, the list may be blank. If incorrect, please contact your healthcare provider. Carry this list with you in case of emergency.           Current Medications     atorvastatin (LIPITOR) 40 MG tablet Take 40 mg by mouth once daily.    hydrochlorothiazide (HYDRODIURIL) 25 MG tablet Take 25 mg by mouth. Take one every four days    multivitamin (ONE DAILY MULTIVITAMIN) per tablet Take 1 tablet by mouth once daily.    sotalol (BETAPACE) 80 MG tablet Take 40 mg by mouth 2 (two) times daily.    warfarin (COUMADIN) 5 MG  "tablet Take 5 mg by mouth Daily. Take 5 mg Mondays, Wednesdays, Fridays.  Take 2.5mg on Tuesdays, Thursdays, Saturdays, Sundays           Clinical Reference Information           Your Vitals Were     BP Pulse Resp Height Weight BMI    120/60 (BP Location: Left arm, Patient Position: Sitting, BP Method: Manual) 76 20 5' 7" (1.702 m) 71.4 kg (157 lb 6.5 oz) 24.65 kg/m2      Blood Pressure          Most Recent Value    BP  120/60      Allergies as of 2/17/2017     Pcn [Penicillins]      Immunizations Administered on Date of Encounter - 2/17/2017     None      [facility] Sign-Up     Activating your [facility] account is as easy as 1-2-3!     1) Visit [email or web address], select Sign Up Now, enter this activation code and your date of birth, then select Next.  [email or web address]  Expires: 3/3/2017  7:45 PM      2) Create a username and password to use when you visit [facility] in the future and select a security question in case you lose your password and select Next.    3) Enter your e-mail address and click Sign Up!    Additional Information  If you have questions, please e-mail [email or web address] or call [phone] to talk to our [facility] staff. Remember, [facility] is NOT to be used for urgent needs. For medical emergencies, dial 911.         Language Assistance Services     ATTENTION: Language assistance services are available, free of charge. Please call [phone].      ATENCIÓN: Si habla español, tiene a zuluaga disposición servicios gratuitos de asistencia lingüística. Llame al [phone].     ZEUS Ý: N?u b?n nói Ti?ng Vi?t, có các d?ch v? h? tr? ngôn ng? mi?n phí dành cho b?n. G?i s? [phone].         [facility] complies with applicable Federal civil rights laws and does not discriminate on the basis of race, color, national origin, age, disability, or sex.        "

## 2017-02-17 NOTE — PROGRESS NOTES
"HPI: Hernesto Sofia is a 89 y.o. male who I saw at [facility] last month for AMS due to pneumonia. He also has some cervical radicular pain at that time which resolved  He is here with his sitter today.  Patient has not been having any neck or head pain since d/c   Patient 's memory is back to baseline.  Baseline is mild memory loss including forgetting appointment but good long term memory.   He has sitters during the week when daughter is at work.  He has home health with home therapy.     I have reviewed all of this patient's past medical and surgical histories as well as family and social histories and active allergies and medications as documented in the electronic medical record.    Review of Systems   Unable to perform ROS: Dementia             Exam:    Gen Appearance, well developed/nourished in no apparent distress  CV: 2+ distal pulses with no edema or swelling  Neuro:  MS: Awake, alert, oriented to place, person, time as "february" but no sure of date, situation. Sustains attention. Recent recall is forgetful of some details/remote memory more intact, Language is full to spontaneous speech/comprehension. Fund of Knowledge is full  CN: Optic discs are flat with normal vasculature, PERRL, Extraoccular movements and visual fields are full. Normal facial sensation and strength, Hearing symmetric, Tongue and Palate are midline and strong. Shoulder Shrug symmetric and strong.  Motor: Normal bulk, tone, no abnormal movements. 5/5 strength bilateral upper/lower extremities with 2+ reflexes and no clonus  Sensory: symmetric to light touch temp, and vibration.  Romberg negative  Cerebellar: Finger-nose,Heal-shin, Rapid alternating movements intact  Gait: Normal stance, no ataxia    Labs: B12, TSH, Ammonia 1/2017 normal    Imaging: Age-appropriate generalized cerebral volume loss with moderate degree of patchy decreased attenuation supratentorial white matter suggestive for chronic microvascular ischemic change " similarly seen on the prior.     No evidence for acute intracranial hemorrhage or definite new abnormal parenchymal attenuation. Clinical correlation and further evaluation as warranted.    C spine xray 1/2017: Cervical spine, AP and lateral: There straightening of the normal cervical lordosis.  There are multilevel degenerative changes consist of disc space narrowing endplate sclerosis, moderate osteophytosis of facet arthropathy.  The prevertebral soft tissues are within normal limits.  No fracture or subluxation is seen.    Assessment/Plan:  Hernesto Sofia is a 89 y.o. male who I saw at [facility] last month for AMS in light of history of some baseline minor memory loss which was felt to be  due to pneumonia and Afib with RVR. He also has some cervical radicular pain at that time which resolved    I recommend:  1. Cervical radicular pain is resolved. Was having head and neck pain on moving head/ no further treatment needed/ held on starting Gabapentin   2. Patient is back to his baseline memory prior to pneumonia. Would avoid treatment of memory at this point unless family desires or unless worsening is noted over time (given co-morbities)      RTC in 6 months.

## 2017-02-24 ENCOUNTER — HOSPITAL ENCOUNTER (EMERGENCY)
Facility: HOSPITAL | Age: 82
Discharge: HOME | End: 2017-02-24
Attending: SURGERY
Payer: MEDICARE

## 2017-02-24 ENCOUNTER — HOSPITAL ENCOUNTER (OUTPATIENT)
Dept: RADIOLOGY | Facility: HOSPITAL | Age: 82
Discharge: HOME | End: 2017-02-24
Attending: FAMILY MEDICINE
Payer: MEDICARE

## 2017-02-24 VITALS
BODY MASS INDEX: 23.49 KG/M2 | WEIGHT: 150 LBS | SYSTOLIC BLOOD PRESSURE: 110 MMHG | HEART RATE: 88 BPM | DIASTOLIC BLOOD PRESSURE: 65 MMHG | TEMPERATURE: 97 F | RESPIRATION RATE: 16 BRPM

## 2017-02-24 DIAGNOSIS — J18.9 PNEUMONIA, ORGANISM UNSPECIFIED(486): ICD-10-CM

## 2017-02-24 DIAGNOSIS — R53.83 FATIGUE, UNSPECIFIED TYPE: Primary | ICD-10-CM

## 2017-02-24 LAB
ALBUMIN SERPL BCP-MCNC: 2.9 G/DL
ALP SERPL-CCNC: 97 U/L
ALT SERPL W/O P-5'-P-CCNC: 15 U/L
ANION GAP SERPL CALC-SCNC: 11 MMOL/L
AST SERPL-CCNC: 28 U/L
BACTERIA #/AREA URNS HPF: ABNORMAL /HPF
BASOPHILS # BLD AUTO: 0.05 K/UL
BASOPHILS NFR BLD: 0.4 %
BILIRUB SERPL-MCNC: 1.4 MG/DL
BILIRUB UR QL STRIP: ABNORMAL
BNP SERPL-MCNC: 400 PG/ML
BUN SERPL-MCNC: 18 MG/DL
CALCIUM SERPL-MCNC: 9.2 MG/DL
CHLORIDE SERPL-SCNC: 104 MMOL/L
CK MB SERPL-MCNC: 1.3 NG/ML
CK MB SERPL-RTO: 1.8 %
CK SERPL-CCNC: 72 U/L
CK SERPL-CCNC: 72 U/L
CLARITY UR: ABNORMAL
CO2 SERPL-SCNC: 23 MMOL/L
COLOR UR: YELLOW
CREAT SERPL-MCNC: 0.9 MG/DL
DIFFERENTIAL METHOD: ABNORMAL
EOSINOPHIL # BLD AUTO: 0.3 K/UL
EOSINOPHIL NFR BLD: 2.1 %
ERYTHROCYTE [DISTWIDTH] IN BLOOD BY AUTOMATED COUNT: 13.9 %
EST. GFR  (AFRICAN AMERICAN): >60 ML/MIN/1.73 M^2
EST. GFR  (NON AFRICAN AMERICAN): >60 ML/MIN/1.73 M^2
GLUCOSE SERPL-MCNC: 95 MG/DL
GLUCOSE UR QL STRIP: NEGATIVE
HCT VFR BLD AUTO: 40.3 %
HGB BLD-MCNC: 13.9 G/DL
HGB UR QL STRIP: ABNORMAL
HYALINE CASTS #/AREA URNS LPF: 0 /LPF
KETONES UR QL STRIP: ABNORMAL
LACTATE SERPL-SCNC: 1.6 MMOL/L
LEUKOCYTE ESTERASE UR QL STRIP: NEGATIVE
LYMPHOCYTES # BLD AUTO: 3 K/UL
LYMPHOCYTES NFR BLD: 25.3 %
MCH RBC QN AUTO: 32.9 PG
MCHC RBC AUTO-ENTMCNC: 34.5 %
MCV RBC AUTO: 96 FL
MICROSCOPIC COMMENT: ABNORMAL
MONOCYTES # BLD AUTO: 1.8 K/UL
MONOCYTES NFR BLD: 15.1 %
NEUTROPHILS # BLD AUTO: 6.9 K/UL
NEUTROPHILS NFR BLD: 57.1 %
NITRITE UR QL STRIP: NEGATIVE
PH UR STRIP: 6 [PH] (ref 5–8)
PLATELET # BLD AUTO: 186 K/UL
PMV BLD AUTO: 10.2 FL
POTASSIUM SERPL-SCNC: 4.4 MMOL/L
PROT SERPL-MCNC: 7.1 G/DL
PROT UR QL STRIP: ABNORMAL
RBC # BLD AUTO: 4.22 M/UL
RBC #/AREA URNS HPF: 3 /HPF (ref 0–4)
SODIUM SERPL-SCNC: 138 MMOL/L
SP GR UR STRIP: 1.02 (ref 1–1.03)
TROPONIN I SERPL DL<=0.01 NG/ML-MCNC: <0.006 NG/ML
URN SPEC COLLECT METH UR: ABNORMAL
UROBILINOGEN UR STRIP-ACNC: NEGATIVE EU/DL
WBC # BLD AUTO: 12.03 K/UL
WBC #/AREA URNS HPF: 1 /HPF (ref 0–5)

## 2017-02-24 PROCEDURE — 84484 ASSAY OF TROPONIN QUANT: CPT

## 2017-02-24 PROCEDURE — 71010 XR CHEST 1 VIEW: CPT | Mod: TC

## 2017-02-24 PROCEDURE — 80053 COMPREHEN METABOLIC PANEL: CPT

## 2017-02-24 PROCEDURE — 83880 ASSAY OF NATRIURETIC PEPTIDE: CPT

## 2017-02-24 PROCEDURE — 82553 CREATINE MB FRACTION: CPT

## 2017-02-24 PROCEDURE — 83605 ASSAY OF LACTIC ACID: CPT

## 2017-02-24 PROCEDURE — 71010 XR CHEST 1 VIEW: CPT | Mod: 26,,, | Performed by: RADIOLOGY

## 2017-02-24 PROCEDURE — 85025 COMPLETE CBC W/AUTO DIFF WBC: CPT

## 2017-02-24 PROCEDURE — 81000 URINALYSIS NONAUTO W/SCOPE: CPT

## 2017-02-24 PROCEDURE — 96360 HYDRATION IV INFUSION INIT: CPT

## 2017-02-24 PROCEDURE — 99284 EMERGENCY DEPT VISIT MOD MDM: CPT | Mod: 25

## 2017-02-24 PROCEDURE — 25000003 PHARM REV CODE 250: Performed by: SURGERY

## 2017-02-24 PROCEDURE — 87040 BLOOD CULTURE FOR BACTERIA: CPT

## 2017-02-24 PROCEDURE — 36415 COLL VENOUS BLD VENIPUNCTURE: CPT

## 2017-02-24 RX ORDER — ALBUTEROL SULFATE 1.25 MG/3ML
1.25 SOLUTION RESPIRATORY (INHALATION) EVERY 6 HOURS PRN
COMMUNITY

## 2017-02-24 RX ADMIN — SODIUM CHLORIDE 500 ML: 0.9 INJECTION, SOLUTION INTRAVENOUS at 08:02

## 2017-02-24 NOTE — ED AVS SNAPSHOT
[facility]  [address] [address]               Hernesto Sofia   2017  8:32 PM   ED    Description:  Male : 1927   Department:  [facility]           Your Care was Coordinated By:     Provider Role From To    Cheo Lu MD Attending Provider 17 --      Reason for Visit     Weakness           Diagnoses this Visit        Comments    Fatigue, unspecified type    -  Primary       ED Disposition     ED Disposition Condition Comment    Discharge             To Do List           Follow-up Information     Follow up with Hasmukh Pederson MD. Schedule an appointment as soon as possible for a visit in 2 days.    Specialty:  Family Medicine    Contact information:    [address] [place] [id number]  [phone]        [facility]     [facility] -  Assistance  Registered nurses in the [facility] provide clinical advisement, health education, appointment booking, and other advisory services.  Call for this free service at [phone].             Medications           Message regarding Medications     Verify the changes and/or additions to your medication regime listed below are the same as discussed with your clinician today.  If any of these changes or additions are incorrect, please notify your healthcare provider.        These medications were administered today        Dose Freq    sodium chloride 0.9% bolus 500 mL 500 mL ED 1 Time    Sig: Inject 500 mLs into the vein ED 1 Time.    Class: Normal    Route: Intravenous      STOP taking these medications     hydrochlorothiazide (HYDRODIURIL) 25 MG tablet Take 25 mg by mouth. Take one every four days           Verify that the below list of medications is an accurate representation of the medications you are currently taking.  If none reported, the list may be blank. If incorrect, please contact your healthcare provider. Carry this list  with you in case of emergency.           Current Medications     albuterol (ACCUNEB) 1.25 mg/3 mL Nebu Take 1.25 mg by nebulization every 6 (six) hours as needed. Rescue    atorvastatin (LIPITOR) 40 MG tablet Take 40 mg by mouth once daily.    multivitamin (ONE DAILY MULTIVITAMIN) per tablet Take 1 tablet by mouth once daily.    sotalol (BETAPACE) 80 MG tablet Take 40 mg by mouth 2 (two) times daily.    warfarin (COUMADIN) 5 MG tablet Take 5 mg by mouth Daily. Take 5 mg Mondays, Wednesdays, Fridays.  Take 2.5mg on Tuesdays, Thursdays, Saturdays, Sundays           Clinical Reference Information           Your Vitals Were     BP                   109/65 (BP Location: Left arm, Patient Position: Sitting)           Allergies as of 2/24/2017        Reactions    Pcn [Penicillins]       Immunizations Administered on Date of Encounter - 2/24/2017     None      ED Micro, Lab, POCT     Start Ordered       Status Ordering Provider    02/24/17 2035 02/24/17 2034  Comprehensive metabolic panel  STAT      Final result     02/24/17 2035 02/24/17 2034  CBC auto differential  STAT      Final result     02/24/17 2035 02/24/17 2034  Troponin I  Now then every 6 hours     Start Status   02/24/17 2035 Final result   02/25/17 0235 Scheduled   02/25/17 0835 Scheduled   02/25/17 1435 Scheduled   02/25/17 2035 Scheduled   02/26/17 0235 Scheduled   02/26/17 0835 Scheduled   02/26/17 1435 Scheduled   02/26/17 2035 Scheduled   02/27/17 0235 Scheduled   02/27/17 0835 Scheduled   02/27/17 1435 Scheduled   02/27/17 2035 Scheduled       Acknowledged     02/24/17 2035 02/24/17 2034  CK  STAT      Final result     02/24/17 2035 02/24/17 2034  CK-MB  STAT      Final result     02/24/17 2035 02/24/17 2034  Brain natriuretic peptide  STAT      Final result     02/24/17 2035 02/24/17 2034  Urinalysis  STAT      Final result     02/24/17 2035 02/24/17 2034  Blood culture  Once      In process     02/24/17 2035 02/24/17 2034  Lactic acid, plasma  STAT       Final result     02/24/17 2035 02/24/17 2035  Urinalysis Microscopic  Once      Final result       ED Imaging Orders     Start Ordered       Status Ordering Provider    02/24/17 2035 02/24/17 2034  X-Ray Chest PA And Lateral  1 time imaging      In process     02/24/17 2035 02/24/17 2034  CT Head Without Contrast  1 time imaging      In process       Discharge References/Attachments     FATIGUE, MANAGING (ENGLISH)      Your Scheduled Appointments     Aug 18, 2017 10:45 AM CDT   Established Patient Visit with Garrison Arizmendi MD   [facility]    [address] [id number] [phone]              [facility] Sign-Up     Activating your [facility] account is as easy as 1-2-3!     1) Visit [email or web address], select Sign Up Now, enter this activation code and your date of birth, then select Next.  [email or web address]  Expires: 3/3/2017  7:45 PM      2) Create a username and password to use when you visit [facility] in the future and select a security question in case you lose your password and select Next.    3) Enter your e-mail address and click Sign Up!    Additional Information  If you have questions, please e-mail [email or web address] or call [phone] to talk to our [facility] staff. Remember, [facility] is NOT to be used for urgent needs. For medical emergencies, dial 911.          [facility] complies with applicable Federal civil rights laws and does not discriminate on the basis of race, color, national origin, age, disability, or sex.        Language Assistance Services     ATTENTION: Language assistance services are available, free of charge. Please call [phone].      ATENCIÓN: Si habla español, tiene a zuluaga disposición servicios gratuitos de asistencia lingüística. Llame al [phone].     CHÚ Ý: N?u b?n nói Ti?ng Vi?t, có các d?ch v? h? tr? ngôn ng? mi?n phí dành cho b?n. G?i s? [phone].

## 2017-02-25 NOTE — ED PROVIDER NOTES
[facility]                                        February 24, 2017                   Chief Complaint  89 y.o. male with Weakness    History of Present Illness  Hernesto Sofia presents to the emergency room with weakness and fatigue tonight  Patient was admitted to the hospital earlier in the month with pneumonia, resolved now  Pt has had fatigue and debility per his family since that bout of pneumonia last month  Patient is alert and oriented ×3 with a GCS 15 and normal motor nerve exam tonight  Patient denies any chest pain and shortness of breath, afebrile with good vital signs   Patient had a chest x-ray earlier today as an outpatient, resolving pneumonia noted  Family states the patient does not drink enough water and has felt fatigued, 100% RA    The history is provided by the patient  Past medical history: A. fib, hypertension, HLD, pacemaker  Past surgical history: Pacemaker placement and surgery  ALLERGIES: Penicillin    Review of Systems and Physical Exam     Review of Systems  -- Constitution - fatigue, no weakness, no chills, no fever  -- Eyes - no tearing or redness, no visual disturbance  -- Ear, Nose - no tinnitus or earache, no nasal congestion or discharge  -- Mouth,Throat - no sore throat, no toothache, normal voice, normal swallowing  -- Respiratory - denies cough and congestion, no shortness of breath, no DONALDSON  -- Cardiovascular - denies chest pain, no palpitations, denies claudication  -- Gastrointestinal - denies abdominal pain, nausea, vomiting, or diarrhea  -- Musculoskeletal - denies back pain, negative for myalgias and arthralgias   -- Neurological - no headache, denies weakness or seizure; no LOC  -- Skin - denies pallor, rash, or changes in skin. no hives or welts noted    Vital Signs  -- His blood pressure is 110/65 and his pulse is 88. His respiration is 16.      Physical Exam  -- Nursing note and vitals reviewed  -- Constitutional: Appears well-developed and  well-nourished  -- Head: Atraumatic. Normocephalic. No obvious abnormality  -- Eyes: Pupils are equal and reactive to light. Normal conjunctiva and lids  -- Cardiac: Normal rate, regular rhythm and normal heart sounds  -- Pulmonary: Normal respiratory effort, breath sounds clear to auscultation  -- Abdominal: Soft, no tenderness. Normal bowel sounds. Normal liver edge  -- Musculoskeletal: Normal range of motion, no effusions. Joints stable   -- Neurological: No focal deficits. Showed good interaction with staff  -- Vascular: Posterior tibial, dorsalis pedis and radial pulses 2+ bilaterally      Emergency Room Course     Treatment and Evaluation  -- The CT of the head performed in the ER today was negative for acute pathology   -- The EKG findings today were without concerning findings from baseline   -- Chest x-ray showed no infiltrate and showed no acute pathology   -- The electrolytes drawn in the ER today were within normal limits   -- The CBC drawn in the ER today was within normal limits   -- The cardiac enzymes were within normal limits   -- Lactic Acid drawn in the ER today was normal   -- Blood cultures have also been drawn, results are pending   -- Urinalysis performed during this ER visit showed no signs of infection   -- BNP was 400  -- Duoneb breathing treatment given today in the ER  -- Saline lock started in the ER per protocol  -- IV Fluids given in today in the ER     Diagnosis  -- The encounter diagnosis was Fatigue, unspecified type.    Disposition and Plan  -- Disposition: home  -- Condition: stable  -- Follow-up: Patient to follow up with Hasmukh Pederson MD in 1-2 days.  -- I advised the patient that we have found no life threatening condition today  -- At this time, I believe the patient is clinically stable for discharge.   -- The patient acknowledges that close follow up with a MD is required   -- Patient agrees to comply with all instruction and direction given in the ER    This note is  dictated on Dragon Natural Speaking word recognition program.  There are word recognition mistakes that are occasionally missed on review.           Cheo Lu MD  02/25/17 0015

## 2017-03-02 LAB — BACTERIA BLD CULT: NORMAL

## 2017-08-18 ENCOUNTER — OFFICE VISIT (OUTPATIENT)
Dept: NEUROLOGY | Facility: CLINIC | Age: 82
End: 2017-08-18
Payer: MEDICARE

## 2017-08-18 VITALS
WEIGHT: 151 LBS | HEIGHT: 68 IN | SYSTOLIC BLOOD PRESSURE: 152 MMHG | DIASTOLIC BLOOD PRESSURE: 88 MMHG | HEART RATE: 68 BPM | RESPIRATION RATE: 16 BRPM | BODY MASS INDEX: 22.88 KG/M2

## 2017-08-18 DIAGNOSIS — R41.3 MEMORY LOSS: Primary | ICD-10-CM

## 2017-08-18 PROCEDURE — 1126F AMNT PAIN NOTED NONE PRSNT: CPT | Mod: S$GLB,,, | Performed by: PSYCHIATRY & NEUROLOGY

## 2017-08-18 PROCEDURE — 3008F BODY MASS INDEX DOCD: CPT | Mod: S$GLB,,, | Performed by: PSYCHIATRY & NEUROLOGY

## 2017-08-18 PROCEDURE — 1159F MED LIST DOCD IN RCRD: CPT | Mod: S$GLB,,, | Performed by: PSYCHIATRY & NEUROLOGY

## 2017-08-18 PROCEDURE — 99999 PR PBB SHADOW E&M-EST. PATIENT-LVL III: CPT | Mod: PBBFAC,,, | Performed by: PSYCHIATRY & NEUROLOGY

## 2017-08-18 PROCEDURE — 99213 OFFICE O/P EST LOW 20 MIN: CPT | Mod: S$GLB,,, | Performed by: PSYCHIATRY & NEUROLOGY

## 2017-08-18 RX ORDER — APIXABAN 2.5 MG/1
TABLET, FILM COATED ORAL
Refills: 11 | COMMUNITY
Start: 2017-07-11

## 2017-08-18 NOTE — PROGRESS NOTES
"HPI:Hernesto Sofia is a 89 y.o. male who I saw at [facility] in 2/2017 for AMS in light of history of some baseline minor memory loss which was felt to be  due to pneumonia and Afib with RVR. He also has some cervical radicular pain at that time which resolved  Memory is likely slowly worse over time. He has forgetfulness daily per his sitter here today.   He no longer has neck pain or head pain.   No falls and eating and sleeping well.  Not agitated  Review of Systems   Unable to perform ROS: Dementia     Exam:    Gen Appearance, well developed/nourished in no apparent distress  CV: 2+ distal pulses with no edema or swelling  Neuro:  MS: Awake, alert, oriented to place, person, time as "June" but not sure of date of day of week, situation. Sustains attention. Recent recall is forgetful of some details/remote memory more intact, Language is full to spontaneous speech/comprehension. Fund of Knowledge is full  CN: Optic discs are flat with normal vasculature, PERRL, Extraoccular movements and visual fields are full. Normal facial sensation and strength, Hearing symmetric, Tongue and Palate are midline and strong. Shoulder Shrug symmetric and strong.  Motor: Normal bulk, tone, no abnormal movements. 5/5 strength bilateral upper/lower extremities with 2+ reflexes and no clonus  Sensory: symmetric to  temp, and vibration.  Romberg negative  Cerebellar: Finger-nose, Rapid alternating movements intact but some apraxia  Gait: Normal stance, no ataxia walking with walker.     Labs: B12, TSH, Ammonia 1/2017 normal    Imaging 2/2017: Age-appropriate generalized cerebral volume loss with moderate degree of patchy decreased attenuation supratentorial white matter suggestive for chronic microvascular ischemic change similarly seen on the prior.     No evidence for acute intracranial hemorrhage or definite new abnormal parenchymal attenuation. Clinical correlation and further evaluation as warranted.    C spine xray 1/2017: " Cervical spine, AP and lateral: There straightening of the normal cervical lordosis.  There are multilevel degenerative changes consist of disc space narrowing endplate sclerosis, moderate osteophytosis of facet arthropathy.  The prevertebral soft tissues are within normal limits.  No fracture or subluxation is seen.    Assessment/Plan:  Hernesto Sofia is a 89 y.o. male who I saw at [facility] in 2/2017 for AMS in light of history of some baseline minor memory loss which was felt to be  due to pneumonia and Afib with RVR. He also has some cervical radicular pain at that time which resolved    I recommend:  1. Cervical radicular pain is  Still resolved. Was having head and neck pain on moving head/ no further treatment needed/ held on starting Gabapentin   2. Will watch memory over time and consider starting Namenda at the next visit if worse or at any point if family desires.   RTC in 6 months.

## 2018-02-02 ENCOUNTER — OFFICE VISIT (OUTPATIENT)
Dept: NEUROLOGY | Facility: CLINIC | Age: 83
End: 2018-02-02
Payer: MEDICARE

## 2018-02-02 VITALS
WEIGHT: 155.19 LBS | SYSTOLIC BLOOD PRESSURE: 140 MMHG | RESPIRATION RATE: 16 BRPM | BODY MASS INDEX: 24.36 KG/M2 | HEART RATE: 64 BPM | HEIGHT: 67 IN | DIASTOLIC BLOOD PRESSURE: 82 MMHG

## 2018-02-02 DIAGNOSIS — R41.3 MEMORY LOSS: Primary | ICD-10-CM

## 2018-02-02 DIAGNOSIS — M50.30 DEGENERATIVE CERVICAL DISC: ICD-10-CM

## 2018-02-02 PROCEDURE — 99999 PR PBB SHADOW E&M-EST. PATIENT-LVL III: CPT | Mod: PBBFAC,,, | Performed by: PSYCHIATRY & NEUROLOGY

## 2018-02-02 PROCEDURE — 1126F AMNT PAIN NOTED NONE PRSNT: CPT | Mod: S$GLB,,, | Performed by: PSYCHIATRY & NEUROLOGY

## 2018-02-02 PROCEDURE — 1159F MED LIST DOCD IN RCRD: CPT | Mod: S$GLB,,, | Performed by: PSYCHIATRY & NEUROLOGY

## 2018-02-02 PROCEDURE — 99214 OFFICE O/P EST MOD 30 MIN: CPT | Mod: S$GLB,,, | Performed by: PSYCHIATRY & NEUROLOGY

## 2018-02-02 PROCEDURE — 3008F BODY MASS INDEX DOCD: CPT | Mod: S$GLB,,, | Performed by: PSYCHIATRY & NEUROLOGY

## 2018-02-02 NOTE — PROGRESS NOTES
HPI:Hernesto Sofia is a 89 y.o. male who I saw at [facility] in 2/2017 for AMS in light of history of some baseline minor memory loss which was felt to be  due to pneumonia and Afib with RVR. He also has some cervical radicular pain at that time which resolved    Patient is here with his granddaughter today  His memory continues to be challenging and mildly worsening  He is not wandering, not hallucinating- tolerating being alone at night.   He is pain free basically- no neck pain and no head pain  He is never agitated   He is comfortable and eating well.   Sitter stays with him during the day and he tolerates home alone at night.   He has a ambulance call button    Review of Systems   Unable to perform ROS: Dementia     Exam:    Gen Appearance, well developed/nourished in no apparent distress  CV: 2+ distal pulses with no edema or swelling  Neuro:  MS: Awake, alert,  Sustains attention. Recent recall is forgetful of some details/remote memory more intact, Language is full to spontaneous speech/comprehension. Fund of Knowledge is full  CN: Optic discs are flat with normal vasculature, PERRL, Extraoccular movements and visual fields are full. Normal facial sensation and strength, Hearing symmetric, Tongue and Palate are midline and strong. Shoulder Shrug symmetric and strong.  Motor: Normal bulk, tone, no abnormal movements. 5/5 strength bilateral upper/lower extremities with 2+ reflexes and no clonus  Sensory: symmetric to  temp, and vibration.   Cerebellar:  some apraxia  Gait: Normal stance, no ataxia walking with walker.     Labs: B12, TSH, Ammonia 1/2017 normal    Imaging 2/2017: Age-appropriate generalized cerebral volume loss with moderate degree of patchy decreased attenuation supratentorial white matter suggestive for chronic microvascular ischemic change similarly seen on the prior.     No evidence for acute intracranial hemorrhage or definite new abnormal parenchymal attenuation. Clinical correlation and  further evaluation as warranted.    C spine xray 1/2017: Cervical spine, AP and lateral: There straightening of the normal cervical lordosis.  There are multilevel degenerative changes consist of disc space narrowing endplate sclerosis, moderate osteophytosis of facet arthropathy.  The prevertebral soft tissues are within normal limits.  No fracture or subluxation is seen.    Assessment/Plan:  Hernesto Sofia is a 89 y.o. male who I saw at [facility] in 2/2017 for AMS in light of history of some baseline minor memory loss which was felt to be  due to pneumonia and Afib with RVR. He also has some cervical radicular pain at that time which resolved    I recommend:  1. Cervical radicular pain is  Still resolved. Was having head and neck pain on moving head/ no further treatment needed/ held on starting Gabapentin . Cervical disc degeneration by 2017 C spine xray  2.Given his memory loss with some sundowning at times: he is candidate for medication such as Namenda. Family declines as patient is not agitated (not hallucinating) and elderly. Notify me if any worse/ need to try a medication.   3. Increase night time supervision if any hallucinating or wandering.   RTC in 6 months.

## 2019-04-09 ENCOUNTER — LAB VISIT (OUTPATIENT)
Dept: LAB | Facility: HOSPITAL | Age: 84
End: 2019-04-09
Attending: FAMILY MEDICINE
Payer: MEDICARE

## 2019-04-09 DIAGNOSIS — N39.0 UTI (URINARY TRACT INFECTION): Primary | ICD-10-CM

## 2019-04-09 LAB
BILIRUB UR QL STRIP: NEGATIVE
CLARITY UR: CLEAR
COLOR UR: YELLOW
GLUCOSE UR QL STRIP: NEGATIVE
HGB UR QL STRIP: NEGATIVE
KETONES UR QL STRIP: ABNORMAL
LEUKOCYTE ESTERASE UR QL STRIP: NEGATIVE
NITRITE UR QL STRIP: NEGATIVE
PH UR STRIP: 6 [PH] (ref 5–8)
PROT UR QL STRIP: NEGATIVE
SP GR UR STRIP: 1.02 (ref 1–1.03)
URN SPEC COLLECT METH UR: ABNORMAL
UROBILINOGEN UR STRIP-ACNC: 1 EU/DL

## 2019-04-09 PROCEDURE — 81003 URINALYSIS AUTO W/O SCOPE: CPT

## 2019-04-09 PROCEDURE — 87086 URINE CULTURE/COLONY COUNT: CPT

## 2019-04-11 LAB — BACTERIA UR CULT: NO GROWTH

## 2019-05-24 ENCOUNTER — HOSPITAL ENCOUNTER (EMERGENCY)
Facility: HOSPITAL | Age: 84
Discharge: HOME | End: 2019-05-24
Attending: SURGERY
Payer: MEDICARE

## 2019-05-24 VITALS
DIASTOLIC BLOOD PRESSURE: 93 MMHG | OXYGEN SATURATION: 99 % | HEIGHT: 65 IN | SYSTOLIC BLOOD PRESSURE: 169 MMHG | RESPIRATION RATE: 19 BRPM | TEMPERATURE: 99 F | BODY MASS INDEX: 25.83 KG/M2 | WEIGHT: 155 LBS | HEART RATE: 85 BPM

## 2019-05-24 DIAGNOSIS — R53.83 FATIGUE: ICD-10-CM

## 2019-05-24 DIAGNOSIS — E86.0 DEHYDRATION: Primary | ICD-10-CM

## 2019-05-24 LAB
ALBUMIN SERPL BCP-MCNC: 3.7 G/DL (ref 3.5–5.2)
ALP SERPL-CCNC: 130 U/L (ref 55–135)
ALT SERPL W/O P-5'-P-CCNC: 18 U/L (ref 10–44)
ANION GAP SERPL CALC-SCNC: 9 MMOL/L (ref 8–16)
APTT BLDCRRT: 32.5 SEC (ref 21–32)
AST SERPL-CCNC: 23 U/L (ref 10–40)
BASOPHILS # BLD AUTO: 0.04 K/UL (ref 0–0.2)
BASOPHILS NFR BLD: 0.4 % (ref 0–1.9)
BILIRUB SERPL-MCNC: 1.6 MG/DL (ref 0.1–1)
BILIRUB UR QL STRIP: NEGATIVE
BNP SERPL-MCNC: 450 PG/ML (ref 0–99)
BUN SERPL-MCNC: 16 MG/DL (ref 10–30)
CALCIUM SERPL-MCNC: 10 MG/DL (ref 8.7–10.5)
CHLORIDE SERPL-SCNC: 107 MMOL/L (ref 95–110)
CK MB SERPL-MCNC: 1.8 NG/ML (ref 0.1–6.5)
CK MB SERPL-RTO: 2.2 % (ref 0–5)
CK SERPL-CCNC: 83 U/L (ref 20–200)
CK SERPL-CCNC: 83 U/L (ref 20–200)
CLARITY UR: CLEAR
CO2 SERPL-SCNC: 26 MMOL/L (ref 23–29)
COLOR UR: YELLOW
CREAT SERPL-MCNC: 0.8 MG/DL (ref 0.5–1.4)
DIFFERENTIAL METHOD: ABNORMAL
EOSINOPHIL # BLD AUTO: 0.4 K/UL (ref 0–0.5)
EOSINOPHIL NFR BLD: 4 % (ref 0–8)
ERYTHROCYTE [DISTWIDTH] IN BLOOD BY AUTOMATED COUNT: 13.5 % (ref 11.5–14.5)
EST. GFR  (AFRICAN AMERICAN): >60 ML/MIN/1.73 M^2
EST. GFR  (NON AFRICAN AMERICAN): >60 ML/MIN/1.73 M^2
GLUCOSE SERPL-MCNC: 99 MG/DL (ref 70–110)
GLUCOSE UR QL STRIP: NEGATIVE
HCT VFR BLD AUTO: 42.4 % (ref 40–54)
HGB BLD-MCNC: 14.6 G/DL (ref 14–18)
HGB UR QL STRIP: ABNORMAL
INR PPP: 1.1 (ref 0.8–1.2)
KETONES UR QL STRIP: NEGATIVE
LEUKOCYTE ESTERASE UR QL STRIP: NEGATIVE
LYMPHOCYTES # BLD AUTO: 2 K/UL (ref 1–4.8)
LYMPHOCYTES NFR BLD: 21.7 % (ref 18–48)
MAGNESIUM SERPL-MCNC: 2 MG/DL (ref 1.6–2.6)
MCH RBC QN AUTO: 32.8 PG (ref 27–31)
MCHC RBC AUTO-ENTMCNC: 34.4 G/DL (ref 32–36)
MCV RBC AUTO: 95 FL (ref 82–98)
MONOCYTES # BLD AUTO: 0.8 K/UL (ref 0.3–1)
MONOCYTES NFR BLD: 8.6 % (ref 4–15)
NEUTROPHILS # BLD AUTO: 6.1 K/UL (ref 1.8–7.7)
NEUTROPHILS NFR BLD: 65.3 % (ref 38–73)
NITRITE UR QL STRIP: NEGATIVE
PH UR STRIP: 6 [PH] (ref 5–8)
PHOSPHATE SERPL-MCNC: 2.8 MG/DL (ref 2.7–4.5)
PLATELET # BLD AUTO: 139 K/UL (ref 150–350)
PMV BLD AUTO: 10.4 FL (ref 9.2–12.9)
POTASSIUM SERPL-SCNC: 4.1 MMOL/L (ref 3.5–5.1)
PROT SERPL-MCNC: 7.3 G/DL (ref 6–8.4)
PROT UR QL STRIP: NEGATIVE
PROTHROMBIN TIME: 11.2 SEC (ref 9–12.5)
RBC # BLD AUTO: 4.45 M/UL (ref 4.6–6.2)
SODIUM SERPL-SCNC: 142 MMOL/L (ref 136–145)
SP GR UR STRIP: 1.01 (ref 1–1.03)
TROPONIN I SERPL DL<=0.01 NG/ML-MCNC: <0.006 NG/ML (ref 0–0.03)
TSH SERPL DL<=0.005 MIU/L-ACNC: 2.91 UIU/ML (ref 0.4–4)
URN SPEC COLLECT METH UR: ABNORMAL
UROBILINOGEN UR STRIP-ACNC: 1 EU/DL
WBC # BLD AUTO: 9.34 K/UL (ref 3.9–12.7)

## 2019-05-24 PROCEDURE — 93005 ELECTROCARDIOGRAM TRACING: CPT

## 2019-05-24 PROCEDURE — 36415 COLL VENOUS BLD VENIPUNCTURE: CPT

## 2019-05-24 PROCEDURE — 84443 ASSAY THYROID STIM HORMONE: CPT

## 2019-05-24 PROCEDURE — 83880 ASSAY OF NATRIURETIC PEPTIDE: CPT

## 2019-05-24 PROCEDURE — 99285 EMERGENCY DEPT VISIT HI MDM: CPT | Mod: 25

## 2019-05-24 PROCEDURE — 96360 HYDRATION IV INFUSION INIT: CPT

## 2019-05-24 PROCEDURE — 93010 EKG 12-LEAD: ICD-10-PCS | Mod: ,,, | Performed by: INTERNAL MEDICINE

## 2019-05-24 PROCEDURE — 85610 PROTHROMBIN TIME: CPT

## 2019-05-24 PROCEDURE — 80053 COMPREHEN METABOLIC PANEL: CPT

## 2019-05-24 PROCEDURE — 84100 ASSAY OF PHOSPHORUS: CPT

## 2019-05-24 PROCEDURE — 82553 CREATINE MB FRACTION: CPT

## 2019-05-24 PROCEDURE — 85730 THROMBOPLASTIN TIME PARTIAL: CPT

## 2019-05-24 PROCEDURE — 83735 ASSAY OF MAGNESIUM: CPT

## 2019-05-24 PROCEDURE — 85025 COMPLETE CBC W/AUTO DIFF WBC: CPT

## 2019-05-24 PROCEDURE — 81003 URINALYSIS AUTO W/O SCOPE: CPT

## 2019-05-24 PROCEDURE — 82550 ASSAY OF CK (CPK): CPT

## 2019-05-24 PROCEDURE — 25000003 PHARM REV CODE 250: Performed by: SURGERY

## 2019-05-24 PROCEDURE — 84484 ASSAY OF TROPONIN QUANT: CPT

## 2019-05-24 PROCEDURE — 93010 ELECTROCARDIOGRAM REPORT: CPT | Mod: ,,, | Performed by: INTERNAL MEDICINE

## 2019-05-24 RX ORDER — SODIUM CHLORIDE 9 MG/ML
1000 INJECTION, SOLUTION INTRAVENOUS CONTINUOUS
Status: DISCONTINUED | OUTPATIENT
Start: 2019-05-24 | End: 2019-05-24 | Stop reason: HOSPADM

## 2019-05-24 RX ADMIN — SODIUM CHLORIDE 500 ML: 0.9 INJECTION, SOLUTION INTRAVENOUS at 10:05

## 2019-05-24 NOTE — ED PROVIDER NOTES
[facility]                                                 Chief Complaint  89 y.o. male with Altered Mental Status (hx of Dementia)    History of Present Illness  Hernesto Sofia presents to the emergency room with altered mental status  Patient has a long history of dementia, has been getting worse over last month  Patient was found on the floor today, alert but obviously demented per daughter  Daughter states that the patient has had increasing weakness and confusion  Patient also has probable tactile hallucination, worse at night per his daughter  Patient is alert obviously demented, no focal weakness identified on ER exam  Review of systems negative with exception of fatigue, afebrile with good vitals    The history is provided by the patient   device was not used during this ER visit  Medical history: Atrial fibrillation, anticoagulant use, dementia, HLD, HTN, pacemaker  Surgeries: Cardiac pacemaker placement and shoulder surgery  Allergies: Penicillin    I have reviewed all of this patient's past medical, surgical, family, and social   histories as well as active allergies and medications documented in the  electronic medical record    Review of Systems and Physical Exam      Review of Systems  -- Constitution - weakness and fatigue with no fever chills  -- Eyes - no tearing or redness, no visual disturbance  -- Ear, Nose - no tinnitus or earache, no nasal congestion or discharge  -- Mouth,Throat - no sore throat, no toothache, normal voice, normal swallowing  -- Respiratory - denies cough and congestion, no shortness of breath, no DONALDSON  -- Cardiovascular - denies chest pain, no palpitations, denies claudication  -- Gastrointestinal - denies abdominal pain, nausea, vomiting, or diarrhea  -- Genitourinary - no dysuria, denies flank pain, no hematuria, no STD risk  -- Musculoskeletal - denies back pain, negative for trauma or injury  -- Neurological - significant  dementia, no focal weakness  -- Skin - denies pallor, rash, or changes in skin. no hives or welts noted    Vital Signs  His tympanic temperature is 98.5 °F (36.9 °C).   His blood pressure is 143/78 and his pulse is 85.   His respiration is 19 and oxygen saturation is 97%.     Physical Exam  -- Nursing note and vitals reviewed  -- Constitutional: Appears well-developed and well-nourished  -- Head: Atraumatic. Normocephalic. No obvious abnormality  -- Eyes: Pupils are equal and reactive to light. Normal conjunctiva and lids  -- Cardiac: Normal rate, regular rhythm and normal heart sounds  -- Pulmonary: Normal respiratory effort, breath sounds clear to auscultation  -- Abdominal: Soft, no tenderness. Normal bowel sounds. Normal liver edge  -- Musculoskeletal: Normal range of motion, no effusions. Joints stable   -- Neurological:  Obvious dementia with no focal deficit appreciated  -- Vascular: Posterior tibial, dorsalis pedis and radial pulses 2+ bilaterally      Emergency Room Course       Lab Results     K 4.1      CO2 26   BUN 16   CREATININE 0.8   GLU 99   ALKPHOS 130   AST 23   ALT 18   BILITOT 1.6 (H)   ALBUMIN 3.7   PROT 7.3   WBC 9.34   HGB 14.6   HCT 42.4    (L)   CPK 83   CPK 83   CPKMB 1.8   TROPONINI <0.006   INR 1.1    (H)   MG 2.0   TSH 2.910     Urinalysis  -- Urinalysis performed during this ER visit showed no signs of infection      EKG  -- EKG shows a rate controlled atrial fibrillation    Radiology  -- The CT of the head performed in the ER today was negative for acute pathology  -- Chest x-ray showed no infiltrate and showed no acute pathology     Medications Given  0.9%  NaCl infusion (has no administration in time range)   sodium chloride 0.9% bolus 500 mL (500 mLs Intravenous New Bag 5/24/19 1001)     MDM  Number of Diagnoses or Management Options  Dehydration: new, needed workup  Fatigue: established, worsening     Amount and/or Complexity of Data Reviewed  Clinical lab  tests: reviewed and ordered  Tests in the radiology section of CPT®: ordered and reviewed  Tests in the medicine section of CPT®: reviewed and ordered  Obtain history from someone other than the patient: yes    Risk of Complications, Morbidity, and/or Mortality  Presenting problems: moderate  Diagnostic procedures: moderate  Management options: moderate      ED Physician Management  -- Diagnosis management comments: 89 y.o. male with weakness and dementia  -- patient was found on the floor his home, unwitnessed fall, his daughter is a nurse  -- patient has had a slow steady decline over last couple months, has a sitter daily  -- patient has had increasing possible hallucinations versus dementia exacerbation  -- metabolic workup today is within normal limits, no urinary tract infection noted  -- daughter/family was offered geriatric psych evaluation for dementia/hallucination  -- they have declined this offer, stating that they will follow up with his primary MD  -- I have had  come to bedside to offer sitter help numbers and home health  -- I will for this note to the patient's neurologist via [facility] ([facility] for follow-up this next week    Diagnosis  -- The primary encounter diagnosis was Dehydration.   -- A diagnosis of Fatigue was also pertinent to this visit.    Disposition and Plan  -- Disposition: home  -- Condition: stable  -- Follow-up: Patient to follow up with Hasmukh Pederson MD in 1-2 days.  -- I advised the patient that we have found no life threatening condition today  -- At this time, I believe the patient is clinically stable for discharge.   -- The patient acknowledges that close follow up with a MD is required   -- Patient agrees to comply with all instruction and direction given in the ER    This note is dictated on M*Modal word recognition program.  There are word recognition mistakes that are occasionally missed on review.         Cheo Lu MD  05/24/19 2601

## 2019-05-24 NOTE — ED TRIAGE NOTES
Pt presents via [facility] with C/O altered mental status and weakness, family states they heard pt yell and found him on the floor this morning. Hx Demntia

## 2019-05-24 NOTE — PLAN OF CARE
Called to ED to speak to patient about home health. Daughter in room and states patient was recently discharged from [facility] in [place]. She states she feels her dad (patient) still needs this. I informed her that patient's PCP Hasmukh Pederson, would have to be notified of this and he would have to place new order for home health. She states understanding and agreement.       Jonna Zuñiga RN, BSN  [facility]   Case Management/Utilization Review  [phone] (Phone)  [phone] (Fax)

## 2019-05-24 NOTE — ED NOTES
The patient was seen, evaluated and discharged. All questions were asked and/or answered and the pt was discharged with written and verbal instructions.  Discharged to home/self care.    - Condition at discharge: Good  - Mode of Discharge: Wheelchair  - The patient left the ED accompanied by a family member.  - The discharge instructions were discussed with the patient and family  - They state an understanding of the discharge instructions.  - Wheelchair pt to the discharge station.

## 2019-05-27 ENCOUNTER — OFFICE VISIT (OUTPATIENT)
Dept: NEUROLOGY | Facility: CLINIC | Age: 84
End: 2019-05-27
Payer: MEDICARE

## 2019-05-27 VITALS
HEART RATE: 79 BPM | BODY MASS INDEX: 24.57 KG/M2 | RESPIRATION RATE: 16 BRPM | DIASTOLIC BLOOD PRESSURE: 64 MMHG | SYSTOLIC BLOOD PRESSURE: 128 MMHG | WEIGHT: 147.5 LBS | HEIGHT: 65 IN

## 2019-05-27 DIAGNOSIS — F02.80 LATE ONSET ALZHEIMER'S DISEASE WITHOUT BEHAVIORAL DISTURBANCE: Primary | ICD-10-CM

## 2019-05-27 DIAGNOSIS — R44.3 HALLUCINATIONS: ICD-10-CM

## 2019-05-27 DIAGNOSIS — M50.30 DEGENERATIVE CERVICAL DISC: ICD-10-CM

## 2019-05-27 DIAGNOSIS — G30.1 LATE ONSET ALZHEIMER'S DISEASE WITHOUT BEHAVIORAL DISTURBANCE: Primary | ICD-10-CM

## 2019-05-27 PROCEDURE — 99999 PR PBB SHADOW E&M-EST. PATIENT-LVL III: ICD-10-PCS | Mod: PBBFAC,,, | Performed by: PSYCHIATRY & NEUROLOGY

## 2019-05-27 PROCEDURE — 1101F PR PT FALLS ASSESS DOC 0-1 FALLS W/OUT INJ PAST YR: ICD-10-PCS | Mod: CPTII,S$GLB,, | Performed by: PSYCHIATRY & NEUROLOGY

## 2019-05-27 PROCEDURE — 1101F PT FALLS ASSESS-DOCD LE1/YR: CPT | Mod: CPTII,S$GLB,, | Performed by: PSYCHIATRY & NEUROLOGY

## 2019-05-27 PROCEDURE — 99214 OFFICE O/P EST MOD 30 MIN: CPT | Mod: S$GLB,,, | Performed by: PSYCHIATRY & NEUROLOGY

## 2019-05-27 PROCEDURE — 99214 PR OFFICE/OUTPT VISIT, EST, LEVL IV, 30-39 MIN: ICD-10-PCS | Mod: S$GLB,,, | Performed by: PSYCHIATRY & NEUROLOGY

## 2019-05-27 PROCEDURE — 99999 PR PBB SHADOW E&M-EST. PATIENT-LVL III: CPT | Mod: PBBFAC,,, | Performed by: PSYCHIATRY & NEUROLOGY

## 2019-05-27 RX ORDER — MEMANTINE HYDROCHLORIDE 5 MG/1
TABLET ORAL
Qty: 60 TABLET | Refills: 11 | Status: ON HOLD | OUTPATIENT
Start: 2019-05-27 | End: 2019-07-20 | Stop reason: CLARIF

## 2019-05-27 NOTE — PROGRESS NOTES
HPI:Hernesto Sofia is a 89 y.o. male who I saw at [facility] in 2/2017 for AMS in light of history of some baseline minor memory loss which was felt to be  due to pneumonia and Afib with RVR. He also has some cervical radicular pain at that time which resolved    The patient has not seen me in over a year.  He was brought to the ER this weekend after his mental status had been worsening over the past month.  The patient was found down in what was thought to be an unwitnessed fall which prompted the ER visit (daughter and sitter had just left his side). He has fallen a few times this past year. He was treated with IVF for dehydration.  Urine was also checked (including culture) by PCP a month ago. All has been negative and he is only mildly improved.  He is more forgetful and is eating and drinking less at times.  He is living in his own home with a sitter M-F and his daughter on weekends and in the evening. He is not wandering. She has to do things like prompt him to go to bed. He has some difficulty with confusion about the TV- does not understand why he can't talk back to the TV. This is reassured by caregivers.  He uses OTC meds PRN pain.  He feels sometimes he has some things crawling on him. Family is considering NH care for the patient.      He sometimes complains of his head and neck pain as prior. This does not seem to last long.     Review of Systems   Unable to perform ROS: Dementia     Exam:    Gen Appearance, well developed/nourished in no apparent distress  CV: 2+ distal pulses with no edema or swelling  Neuro:  MS: Awake, alert,  Sustains attention. Not oriented except to person.  Recent recall is forgetful of some details/remote memory more intact, Language is full to spontaneous speech/comprehension. Fund of Knowledge is full  CN: Optic discs are flat with normal vasculature, PERRL, Extraoccular movements and visual fields are full. Normal facial sensation and strength, Hearing symmetric, Tongue and  Palate are midline and strong. Shoulder Shrug symmetric and strong.  Motor: Normal bulk, tone, no abnormal movements. 5/5 strength bilateral upper/lower extremities with 2+ reflexes and no clonus  Sensory: symmetric to  temp, and vibration.   Cerebellar:  some apraxia  Gait: Normal stance, no ataxia walking with walker but gait is slowed    Labs: B12, TSH, Ammonia 1/2017 normal    5/2019 Labs from ER reviewed    C spine xray 1/2017: Cervical spine, AP and lateral: There straightening of the normal cervical lordosis.  There are multilevel degenerative changes consist of disc space narrowing endplate sclerosis, moderate osteophytosis of facet arthropathy.  The prevertebral soft tissues are within normal limits.  No fracture or subluxation is seen.      5/2019 CT head: 1.  No CT evidence of an acute intracranial abnormality.    2.  Atrophy and small vessel ischemic changes of the periventricular white matter.    3.  Ethmoid and maxillary sinus mucosal thickening.    Assessment/Plan:  Hernesto Sofia is a 89 y.o. male who I saw at [facility] in 2/2017 for AMS in light of history of some baseline minor memory loss which was felt to be  due to pneumonia and Afib with RVR. He also has some cervical radicular pain at that time which resolved  His memory loss has progressed to dementia at this point, likely Alzheimer's disease.     I recommend:  1. Cervical radicular pain had resoloved-was having head and neck pain on moving head/ no further treatment needed/ held on starting Gabapentin . Cervical disc degeneration by 2017 C spine xray. This is reassured by family now, uses OTC meds PRN. Monitor  2.Given his memory loss worsening over time (suggesting dementia): Namenda offered prior and family accepts today per orders unless side effects. Family is considering NH placement otherwise. Agree, Increased (24 hour) supervision is needed if his hallucinations (which are minor and reassured) continue.   3. Note: He uses NOAC for  stroke prevention given his afib  RTC in 6 months.

## 2019-05-31 PROCEDURE — G0180 PR HOME HEALTH MD CERTIFICATION: ICD-10-PCS | Mod: ,,, | Performed by: PSYCHIATRY & NEUROLOGY

## 2019-05-31 PROCEDURE — G0180 MD CERTIFICATION HHA PATIENT: HCPCS | Mod: ,,, | Performed by: PSYCHIATRY & NEUROLOGY

## 2019-06-04 ENCOUNTER — TELEPHONE (OUTPATIENT)
Dept: NEUROLOGY | Facility: CLINIC | Age: 84
End: 2019-06-04

## 2019-06-04 NOTE — TELEPHONE ENCOUNTER
----- Message from Tracy Teran sent at 2019  3:53 PM CDT -----  Contact: MONIE Thang Sofia  MRN: [id number]  : 1927  PCP: Hasmukh Pederson  Home Phone      [phone]  Work Phone      Not on file.  Mobile          [phone]      MESSAGE: Monie is recommending that the patient have a cognative home exercise program which would include speech therapy twice a week x 4 weeks.  She needs to get the verbal ok for this plan of care.        Phone: [phone]

## 2019-06-16 ENCOUNTER — HOSPITAL ENCOUNTER (EMERGENCY)
Facility: HOSPITAL | Age: 84
Discharge: HOME | End: 2019-06-16
Attending: SURGERY
Payer: MEDICARE

## 2019-06-16 VITALS
SYSTOLIC BLOOD PRESSURE: 137 MMHG | TEMPERATURE: 99 F | BODY MASS INDEX: 26.93 KG/M2 | HEART RATE: 86 BPM | WEIGHT: 161.63 LBS | OXYGEN SATURATION: 97 % | DIASTOLIC BLOOD PRESSURE: 80 MMHG | RESPIRATION RATE: 20 BRPM | HEIGHT: 65 IN

## 2019-06-16 DIAGNOSIS — R55 POSTURAL DIZZINESS WITH PRESYNCOPE: ICD-10-CM

## 2019-06-16 DIAGNOSIS — R53.83 FATIGUE: ICD-10-CM

## 2019-06-16 DIAGNOSIS — R42 POSTURAL DIZZINESS WITH PRESYNCOPE: ICD-10-CM

## 2019-06-16 LAB
ALBUMIN SERPL BCP-MCNC: 3.7 G/DL (ref 3.5–5.2)
ALP SERPL-CCNC: 124 U/L (ref 55–135)
ALT SERPL W/O P-5'-P-CCNC: 17 U/L (ref 10–44)
ANION GAP SERPL CALC-SCNC: 12 MMOL/L (ref 8–16)
APTT BLDCRRT: 33.2 SEC (ref 21–32)
AST SERPL-CCNC: 20 U/L (ref 10–40)
BASOPHILS # BLD AUTO: 0.05 K/UL (ref 0–0.2)
BASOPHILS NFR BLD: 0.4 % (ref 0–1.9)
BILIRUB SERPL-MCNC: 1.3 MG/DL (ref 0.1–1)
BILIRUB UR QL STRIP: NEGATIVE
BNP SERPL-MCNC: 354 PG/ML (ref 0–99)
BUN SERPL-MCNC: 19 MG/DL (ref 10–30)
CALCIUM SERPL-MCNC: 9.5 MG/DL (ref 8.7–10.5)
CHLORIDE SERPL-SCNC: 106 MMOL/L (ref 95–110)
CK MB SERPL-MCNC: 1.4 NG/ML (ref 0.1–6.5)
CK MB SERPL-RTO: 2.5 % (ref 0–5)
CK SERPL-CCNC: 57 U/L (ref 20–200)
CK SERPL-CCNC: 57 U/L (ref 20–200)
CLARITY UR: CLEAR
CO2 SERPL-SCNC: 23 MMOL/L (ref 23–29)
COLOR UR: YELLOW
CREAT SERPL-MCNC: 0.8 MG/DL (ref 0.5–1.4)
D DIMER PPP IA.FEU-MCNC: 0.44 MG/L FEU
DIFFERENTIAL METHOD: ABNORMAL
EOSINOPHIL # BLD AUTO: 0.4 K/UL (ref 0–0.5)
EOSINOPHIL NFR BLD: 3.3 % (ref 0–8)
ERYTHROCYTE [DISTWIDTH] IN BLOOD BY AUTOMATED COUNT: 14 % (ref 11.5–14.5)
EST. GFR  (AFRICAN AMERICAN): >60 ML/MIN/1.73 M^2
EST. GFR  (NON AFRICAN AMERICAN): >60 ML/MIN/1.73 M^2
GLUCOSE SERPL-MCNC: 107 MG/DL (ref 70–110)
GLUCOSE UR QL STRIP: NEGATIVE
HCT VFR BLD AUTO: 41.5 % (ref 40–54)
HGB BLD-MCNC: 13.6 G/DL (ref 14–18)
HGB UR QL STRIP: ABNORMAL
INR PPP: 1.1 (ref 0.8–1.2)
KETONES UR QL STRIP: NEGATIVE
LEUKOCYTE ESTERASE UR QL STRIP: NEGATIVE
LYMPHOCYTES # BLD AUTO: 2.6 K/UL (ref 1–4.8)
LYMPHOCYTES NFR BLD: 23.6 % (ref 18–48)
MAGNESIUM SERPL-MCNC: 2.1 MG/DL (ref 1.6–2.6)
MCH RBC QN AUTO: 31.8 PG (ref 27–31)
MCHC RBC AUTO-ENTMCNC: 32.8 G/DL (ref 32–36)
MCV RBC AUTO: 97 FL (ref 82–98)
MONOCYTES # BLD AUTO: 1 K/UL (ref 0.3–1)
MONOCYTES NFR BLD: 9.3 % (ref 4–15)
NEUTROPHILS # BLD AUTO: 7.1 K/UL (ref 1.8–7.7)
NEUTROPHILS NFR BLD: 63.4 % (ref 38–73)
NITRITE UR QL STRIP: NEGATIVE
PH UR STRIP: 6 [PH] (ref 5–8)
PHOSPHATE SERPL-MCNC: 2.9 MG/DL (ref 2.7–4.5)
PLATELET # BLD AUTO: 136 K/UL (ref 150–350)
PMV BLD AUTO: 10.5 FL (ref 9.2–12.9)
POTASSIUM SERPL-SCNC: 3.9 MMOL/L (ref 3.5–5.1)
PROT SERPL-MCNC: 7.3 G/DL (ref 6–8.4)
PROT UR QL STRIP: NEGATIVE
PROTHROMBIN TIME: 11.4 SEC (ref 9–12.5)
RBC # BLD AUTO: 4.28 M/UL (ref 4.6–6.2)
SODIUM SERPL-SCNC: 141 MMOL/L (ref 136–145)
SP GR UR STRIP: 1.02 (ref 1–1.03)
T4 FREE SERPL-MCNC: 0.95 NG/DL (ref 0.71–1.51)
TROPONIN I SERPL DL<=0.01 NG/ML-MCNC: 0.01 NG/ML (ref 0–0.03)
TSH SERPL DL<=0.005 MIU/L-ACNC: 4.01 UIU/ML (ref 0.4–4)
URN SPEC COLLECT METH UR: ABNORMAL
UROBILINOGEN UR STRIP-ACNC: 1 EU/DL
WBC # BLD AUTO: 11.16 K/UL (ref 3.9–12.7)

## 2019-06-16 PROCEDURE — 85730 THROMBOPLASTIN TIME PARTIAL: CPT

## 2019-06-16 PROCEDURE — 25000003 PHARM REV CODE 250: Performed by: SURGERY

## 2019-06-16 PROCEDURE — 82550 ASSAY OF CK (CPK): CPT

## 2019-06-16 PROCEDURE — 85025 COMPLETE CBC W/AUTO DIFF WBC: CPT

## 2019-06-16 PROCEDURE — 84100 ASSAY OF PHOSPHORUS: CPT

## 2019-06-16 PROCEDURE — 84484 ASSAY OF TROPONIN QUANT: CPT

## 2019-06-16 PROCEDURE — 83735 ASSAY OF MAGNESIUM: CPT

## 2019-06-16 PROCEDURE — 85379 FIBRIN DEGRADATION QUANT: CPT

## 2019-06-16 PROCEDURE — 82553 CREATINE MB FRACTION: CPT

## 2019-06-16 PROCEDURE — 99285 EMERGENCY DEPT VISIT HI MDM: CPT | Mod: 25

## 2019-06-16 PROCEDURE — 84443 ASSAY THYROID STIM HORMONE: CPT

## 2019-06-16 PROCEDURE — 80053 COMPREHEN METABOLIC PANEL: CPT

## 2019-06-16 PROCEDURE — 85610 PROTHROMBIN TIME: CPT

## 2019-06-16 PROCEDURE — 81003 URINALYSIS AUTO W/O SCOPE: CPT

## 2019-06-16 PROCEDURE — 84439 ASSAY OF FREE THYROXINE: CPT

## 2019-06-16 PROCEDURE — 96360 HYDRATION IV INFUSION INIT: CPT

## 2019-06-16 PROCEDURE — 83880 ASSAY OF NATRIURETIC PEPTIDE: CPT

## 2019-06-16 RX ADMIN — SODIUM CHLORIDE 500 ML: 0.9 INJECTION, SOLUTION INTRAVENOUS at 08:06

## 2019-06-17 NOTE — ED TRIAGE NOTES
"89 y.o. male presents to ER   Chief Complaint   Patient presents with    Weakness     presyncope just PTA   . No acute distress noted.    Got up to go to the bathroom and "felt like he was going to pass out."  Denies fall/loss of consciousness.  "

## 2019-06-17 NOTE — ED NOTES
Daughter and son-in-law at bedside.  Bed in low and locked position.  2 siderails up.  Call bell in reach.  Voiced understanding of use.  In NAD.  Even and unlabored breathing.  Awaiting provider evaluation.  Resting comfortably.  Patient has no complaints at this time.    Patient placed on continuous cardiac monitoring, continuous pulse oximetry, and automatic NIBP.     Updated on plan of care.  Will continue to monitor.

## 2019-06-18 ENCOUNTER — TELEPHONE (OUTPATIENT)
Dept: NEUROLOGY | Facility: CLINIC | Age: 84
End: 2019-06-18

## 2019-06-18 NOTE — TELEPHONE ENCOUNTER
----- Message from Krystal Pederson sent at 2019  9:32 AM CDT -----  Contact: maurice/ daughter  Hernesto Sofia  MRN: [id number]  : 1927  PCP: Hasmukh Pederson  Home Phone      [phone]  Work Phone      Not on file.  Mobile          [phone]       MESSAGE: Pt's daughter Maurice calling in regards to pt having to go to ER . Pt almost passed out, legs were shake, he was pale, and bp was around 174/121. Pt would like to discuss if there's anything to help this. Please advise, thanks.  Phone: [phone]

## 2019-06-18 NOTE — TELEPHONE ENCOUNTER
"Patients daughter Cecile states that ever since the patient started taking Namenda, he is "worse than ever" and she will be discontinuing it tonight. Patient with worsening confusion, more forgetful, weakness and staying in Afib, elevated blood pressure, ED visit over the weekend and she contributes this to Namenda. She just wanted you to be aware that she would not be giving this medication to her father any longer.  "

## 2019-07-01 ENCOUNTER — HOSPITAL ENCOUNTER (EMERGENCY)
Facility: HOSPITAL | Age: 84
Discharge: HOME | End: 2019-07-01
Attending: INTERNAL MEDICINE
Payer: MEDICARE

## 2019-07-01 VITALS
DIASTOLIC BLOOD PRESSURE: 80 MMHG | SYSTOLIC BLOOD PRESSURE: 188 MMHG | TEMPERATURE: 98 F | RESPIRATION RATE: 20 BRPM | OXYGEN SATURATION: 99 % | HEART RATE: 88 BPM

## 2019-07-01 DIAGNOSIS — R53.1 WEAKNESS: ICD-10-CM

## 2019-07-01 DIAGNOSIS — S51.812A SKIN TEAR OF LEFT FOREARM WITHOUT COMPLICATION, INITIAL ENCOUNTER: ICD-10-CM
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
## 2019-07-01 LAB
ALBUMIN SERPL BCP-MCNC: 3.8 G/DL (ref 3.5–5.2)
ALP SERPL-CCNC: 123 U/L (ref 55–135)
ALT SERPL W/O P-5'-P-CCNC: 21 U/L (ref 10–44)
ANION GAP SERPL CALC-SCNC: 9 MMOL/L (ref 8–16)
APTT BLDCRRT: 34.4 SEC (ref 21–32)
AST SERPL-CCNC: 22 U/L (ref 10–40)
BASOPHILS # BLD AUTO: 0.07 K/UL (ref 0–0.2)
BASOPHILS NFR BLD: 0.8 % (ref 0–1.9)
BILIRUB SERPL-MCNC: 1.5 MG/DL (ref 0.1–1)
BILIRUB UR QL STRIP: NEGATIVE
BNP SERPL-MCNC: 448 PG/ML (ref 0–99)
BUN SERPL-MCNC: 16 MG/DL (ref 10–30)
CALCIUM SERPL-MCNC: 9.9 MG/DL (ref 8.7–10.5)
CHLORIDE SERPL-SCNC: 107 MMOL/L (ref 95–110)
CK MB SERPL-MCNC: 2.1 NG/ML (ref 0.1–6.5)
CK MB SERPL-MCNC: 2.4 NG/ML (ref 0.1–6.5)
CK MB SERPL-RTO: 2.3 % (ref 0–5)
CK MB SERPL-RTO: 2.5 % (ref 0–5)
CK SERPL-CCNC: 91 U/L (ref 20–200)
CK SERPL-CCNC: 91 U/L (ref 20–200)
CK SERPL-CCNC: 95 U/L (ref 20–200)
CK SERPL-CCNC: 95 U/L (ref 20–200)
CLARITY UR: CLEAR
CO2 SERPL-SCNC: 27 MMOL/L (ref 23–29)
COLOR UR: YELLOW
CREAT SERPL-MCNC: 0.8 MG/DL (ref 0.5–1.4)
DIFFERENTIAL METHOD: ABNORMAL
EOSINOPHIL # BLD AUTO: 0.5 K/UL (ref 0–0.5)
EOSINOPHIL NFR BLD: 5.3 % (ref 0–8)
ERYTHROCYTE [DISTWIDTH] IN BLOOD BY AUTOMATED COUNT: 13.7 % (ref 11.5–14.5)
EST. GFR  (AFRICAN AMERICAN): >60 ML/MIN/1.73 M^2
EST. GFR  (NON AFRICAN AMERICAN): >60 ML/MIN/1.73 M^2
GLUCOSE SERPL-MCNC: 85 MG/DL (ref 70–110)
GLUCOSE UR QL STRIP: NEGATIVE
HCT VFR BLD AUTO: 40.8 % (ref 40–54)
HGB BLD-MCNC: 13.6 G/DL (ref 14–18)
HGB UR QL STRIP: ABNORMAL
IMM GRANULOCYTES # BLD AUTO: 0.02 K/UL (ref 0–0.04)
IMM GRANULOCYTES NFR BLD AUTO: 0.2 % (ref 0–0.5)
INR PPP: 1.1 (ref 0.8–1.2)
KETONES UR QL STRIP: NEGATIVE
LEUKOCYTE ESTERASE UR QL STRIP: NEGATIVE
LYMPHOCYTES # BLD AUTO: 1.8 K/UL (ref 1–4.8)
LYMPHOCYTES NFR BLD: 19.3 % (ref 18–48)
MAGNESIUM SERPL-MCNC: 2 MG/DL (ref 1.6–2.6)
MCH RBC QN AUTO: 31.5 PG (ref 27–31)
MCHC RBC AUTO-ENTMCNC: 33.3 G/DL (ref 32–36)
MCV RBC AUTO: 94 FL (ref 82–98)
MONOCYTES # BLD AUTO: 0.7 K/UL (ref 0.3–1)
MONOCYTES NFR BLD: 7.1 % (ref 4–15)
NEUTROPHILS # BLD AUTO: 6.2 K/UL (ref 1.8–7.7)
NEUTROPHILS NFR BLD: 67.3 % (ref 38–73)
NITRITE UR QL STRIP: NEGATIVE
NRBC BLD-RTO: 0 /100 WBC
PH UR STRIP: 5 [PH] (ref 5–8)
PHOSPHATE SERPL-MCNC: 3 MG/DL (ref 2.7–4.5)
PLATELET # BLD AUTO: 142 K/UL (ref 150–350)
PMV BLD AUTO: 10.5 FL (ref 9.2–12.9)
POTASSIUM SERPL-SCNC: 4.4 MMOL/L (ref 3.5–5.1)
PROT SERPL-MCNC: 7.4 G/DL (ref 6–8.4)
PROT UR QL STRIP: NEGATIVE
PROTHROMBIN TIME: 11.1 SEC (ref 9–12.5)
RBC # BLD AUTO: 4.32 M/UL (ref 4.6–6.2)
SODIUM SERPL-SCNC: 143 MMOL/L (ref 136–145)
SP GR UR STRIP: <=1.005 (ref 1–1.03)
TROPONIN I SERPL DL<=0.01 NG/ML-MCNC: 0.01 NG/ML (ref 0–0.03)
TROPONIN I SERPL DL<=0.01 NG/ML-MCNC: <0.006 NG/ML (ref 0–0.03)
TSH SERPL DL<=0.005 MIU/L-ACNC: 3.31 UIU/ML (ref 0.4–4)
URN SPEC COLLECT METH UR: ABNORMAL
UROBILINOGEN UR STRIP-ACNC: NEGATIVE EU/DL
WBC # BLD AUTO: 9.14 K/UL (ref 3.9–12.7)

## 2019-07-01 PROCEDURE — 82553 CREATINE MB FRACTION: CPT | Mod: 91

## 2019-07-01 PROCEDURE — 36415 COLL VENOUS BLD VENIPUNCTURE: CPT

## 2019-07-01 PROCEDURE — 82550 ASSAY OF CK (CPK): CPT

## 2019-07-01 PROCEDURE — 93010 ELECTROCARDIOGRAM REPORT: CPT | Mod: ,,, | Performed by: INTERNAL MEDICINE

## 2019-07-01 PROCEDURE — 25000003 PHARM REV CODE 250: Performed by: SURGERY

## 2019-07-01 PROCEDURE — 81003 URINALYSIS AUTO W/O SCOPE: CPT

## 2019-07-01 PROCEDURE — 85730 THROMBOPLASTIN TIME PARTIAL: CPT

## 2019-07-01 PROCEDURE — 80053 COMPREHEN METABOLIC PANEL: CPT

## 2019-07-01 PROCEDURE — 93010 EKG 12-LEAD: ICD-10-PCS | Mod: ,,, | Performed by: INTERNAL MEDICINE

## 2019-07-01 PROCEDURE — 84484 ASSAY OF TROPONIN QUANT: CPT | Mod: 91

## 2019-07-01 PROCEDURE — 84100 ASSAY OF PHOSPHORUS: CPT

## 2019-07-01 PROCEDURE — 84443 ASSAY THYROID STIM HORMONE: CPT

## 2019-07-01 PROCEDURE — 85610 PROTHROMBIN TIME: CPT

## 2019-07-01 PROCEDURE — 99285 EMERGENCY DEPT VISIT HI MDM: CPT | Mod: 25

## 2019-07-01 PROCEDURE — 83735 ASSAY OF MAGNESIUM: CPT

## 2019-07-01 PROCEDURE — 83880 ASSAY OF NATRIURETIC PEPTIDE: CPT

## 2019-07-01 PROCEDURE — 85025 COMPLETE CBC W/AUTO DIFF WBC: CPT

## 2019-07-01 PROCEDURE — 93005 ELECTROCARDIOGRAM TRACING: CPT

## 2019-07-01 RX ORDER — MUPIROCIN 20 MG/G
OINTMENT TOPICAL 3 TIMES DAILY
Qty: 15 G | Refills: 0 | Status: SHIPPED | OUTPATIENT
Start: 2019-07-01 | End: 2019-07-11

## 2019-07-01 RX ORDER — MUPIROCIN 20 MG/G
1 OINTMENT TOPICAL
Status: COMPLETED | OUTPATIENT
Start: 2019-07-01 | End: 2019-07-01

## 2019-07-01 RX ADMIN — MUPIROCIN 22 G: 20 OINTMENT TOPICAL at 09:07

## 2019-07-01 NOTE — ED TRIAGE NOTES
Patient presents to the ER via EMS after falling at home an hour ago.  Patient has dementia, lives alone with daughter living in front of his house.  Daughter reports having sitters and cameras to watch the patient.  Patient's fall was not witnessed.  Patient denies any pain at this time.  Patient is on blood thinners and has skin tear to left forearm which is bandaged per EMS.  Daughter is at bedside.

## 2019-07-01 NOTE — PLAN OF CARE
Called to ED to speak with patient about nursing home placement. Family states they would like for patient to be placed in home, as it is too hard to care for him at home now. I educated family that the process can not start from here because the patient does not have a qualifying diagnosis for admit. I then educated the family on the process for nursing home placement from outpatient. They voiced their understanding. Contact information given to family, and they are encouraged to call for any concerns or assistance.       Jonna Zuñiga RN, BSN  [facility]   Case Management/Utilization Review  [phone] (Phone)  [phone] (Fax)

## 2019-07-01 NOTE — ED PROVIDER NOTES
[facility]                                                 Chief Complaint  89 y.o. male with Fall    History of Present Illness  Hernesto Sofia presents to the emergency room with slip and fall today  Patient was found on the ground, unwitnessed fall by the family this morning  Has severe dementia, very unsteady the several falls the last couple weeks  Patient is alert and appropriate with no signs of closed head injury on exam  Patient has a small skin tear left forearm no active bleeding on evaluation  Patient's family would like nursing home placement, fall risk for the last month  Family has 24 hours a day sitters, patient does not appear to be in danger    The history is provided by the patient   device was not used during this ER visit  Past medical history: A. fib, hypertension, HLD, pacemaker  Past surgical history: Pacemaker placement and surgery  ALLERGIES: Penicillin    I have reviewed all of this patient's past medical, surgical, family, and social   histories as well as active allergies and medications documented in the  electronic medical record    Review of Systems and Physical Exam      Review of Systems  -- Constitution - no fever, denies fatigue, no weakness, no chills  -- Eyes - no tearing or redness, no visual disturbance  -- Ear, Nose - no tinnitus or earache, no nasal congestion or discharge  -- Mouth,Throat - no sore throat, no toothache, normal voice, normal swallowing  -- Respiratory - denies cough and congestion, no shortness of breath, no DONALDSON  -- Cardiovascular - denies chest pain, no palpitations, denies claudication  -- Gastrointestinal - denies abdominal pain, nausea, vomiting, or diarrhea  -- Musculoskeletal - denies back pain, negative for trauma or injury  -- Neurological - no headache, denies weakness or seizure; no LOC  -- Skin - denies pallor, rash, or changes in skin. no hives or welts noted  -- Psychiatric - Denies SI or HI, no  psychosis or fractured thought noted     Vital Signs  His blood pressure is 191/91 and his pulse is 99.   His respiration is 22 and oxygen saturation is 100%.     Physical Exam  -- Nursing note and vitals reviewed  -- Constitutional: Appears well-developed and well-nourished  -- Head: Atraumatic. Normocephalic. No obvious abnormality  -- Eyes: Pupils are equal and reactive to light. Normal conjunctiva and lids  -- Neck: Normal range of motion. Neck supple. No masses, trachea midline  -- Cardiac: Normal rate, regular rhythm and normal heart sounds  -- Pulmonary: Normal respiratory effort, breath sounds clear to auscultation  -- Abdominal: Soft, no tenderness. Normal bowel sounds. Normal liver edge  -- Musculoskeletal: Normal range of motion, no effusions. Joints stable   -- Neurological: No focal deficits. Showed good interaction with staff  -- Vascular: Posterior tibial, dorsalis pedis and radial pulses 2+ bilaterally      Emergency Room Course      Lab Results     K 4.4      CO2 27   BUN 16   CREATININE 0.8   GLU 85   ALKPHOS 123   AST 22   ALT 21   BILITOT 1.5 (H)   ALBUMIN 3.8   PROT 7.4   WBC 9.14   HGB 13.6 (L)   HCT 40.8    (L)   CPK 95   CPK 95   CPKMB 2.4   TROPONINI 0.009   INR 1.1    (H)   DDIMER 0.44   MG 2.0   TSH 3.306     Urinalysis  -- Urinalysis performed during this ER visit showed no signs of infection      EKG  Atrial fibrillation with occasional ventricular-paced complexes  Left anterior fascicular block    Radiology  -- The CT of the head performed in the ER today was negative for acute pathology  -- Chest x-ray showed no infiltrate and showed no acute pathology  -- Pelvis x-ray showed no evidence of fracture or dislocation     Medications Given  mupirocin 2 % ointment 22 g (22 g Topical (Top) Given 7/1/19 0904)     MDM  Number of Diagnoses or Management Options  Fall, initial encounter: new, needed workup  Skin tear of left forearm without complication, initial  encounter: new, needed workup  Weakness: new, needed workup     Amount and/or Complexity of Data Reviewed  Clinical lab tests: ordered and reviewed  Tests in the radiology section of CPT®: ordered and reviewed  Tests in the medicine section of CPT®: ordered and reviewed  Obtain history from someone other than the patient: yes    Risk of Complications, Morbidity, and/or Mortality  Presenting problems: minimal  Diagnostic procedures: minimal  Management options: minimal       ED Physician Management  -- Diagnosis management comments: 89 y.o. male with fall today  -- severe dementia; no injury on ER evaluation this morning  --  has come to bedside to discuss with the daughter  -- needs to be placed in a nursing home, daughter will start process  -- patient does not meet any inpatient criteria, negative workup in the ER  -- patient's family voiced understanding, will contact primary care MD  -- patient needs nursing home placement due to severe dementia    Diagnosis  -- Fall, initial encounter  -- Skin tear of left forearm without complication  -- Weakness     Disposition and Plan  -- Disposition: home  -- Condition: stable  -- Follow-up: Patient to follow up with Hasmukh Pederson MD in 1-2 days.  -- I advised the patient that we have found no life threatening condition today  -- At this time, I believe the patient is clinically stable for discharge.   -- The patient acknowledges that close follow up with a MD is required   -- Patient agrees to comply with all instruction and direction given in the ER    This note is dictated on M*Modal word recognition program.  There are word recognition mistakes that are occasionally missed on review.         Cheo Lu MD  07/01/19 8191

## 2019-07-01 NOTE — ED NOTES
Discharged to home/self care.    - Condition at discharge: Good  - Mode of Discharge: wheelchair  - The patient left the ED accompanied by a family member.  - The discharge instructions were discussed with the patient's daughter.  - They state an understanding of the discharge instructions.  - Walked pt to the discharge station.

## 2019-07-20 ENCOUNTER — HOSPITAL ENCOUNTER (INPATIENT)
Facility: HOSPITAL | Age: 84
LOS: 4 days | Discharge: NURSING, REHAB OR LONG-TERM CARE | DRG: 689 | End: 2019-07-24
Attending: SURGERY | Admitting: FAMILY MEDICINE
Payer: MEDICARE

## 2019-07-20 DIAGNOSIS — R50.9 FEVER: ICD-10-CM

## 2019-07-20 DIAGNOSIS — R41.82 ALTERED MENTAL STATUS, UNSPECIFIED ALTERED MENTAL STATUS TYPE: ICD-10-CM

## 2019-07-20 DIAGNOSIS — N39.0 COMPLICATED UTI (URINARY TRACT INFECTION): Primary | ICD-10-CM

## 2019-07-20 DIAGNOSIS — J69.0 ASPIRATION PNEUMONIA OF LEFT UPPER LOBE, UNSPECIFIED ASPIRATION PNEUMONIA TYPE: ICD-10-CM

## 2019-07-20 LAB
ALBUMIN SERPL BCP-MCNC: 3.6 G/DL (ref 3.5–5.2)
ALP SERPL-CCNC: 137 U/L (ref 55–135)
ALT SERPL W/O P-5'-P-CCNC: 19 U/L (ref 10–44)
ANION GAP SERPL CALC-SCNC: 13 MMOL/L (ref 8–16)
APTT BLDCRRT: 34 SEC (ref 21–32)
AST SERPL-CCNC: 21 U/L (ref 10–40)
BACTERIA #/AREA URNS HPF: ABNORMAL /HPF
BASOPHILS # BLD AUTO: 0.08 K/UL (ref 0–0.2)
BASOPHILS NFR BLD: 0.5 % (ref 0–1.9)
BILIRUB SERPL-MCNC: 1.5 MG/DL (ref 0.1–1)
BILIRUB UR QL STRIP: NEGATIVE
BNP SERPL-MCNC: 314 PG/ML (ref 0–99)
BUN SERPL-MCNC: 18 MG/DL (ref 10–30)
CALCIUM SERPL-MCNC: 10 MG/DL (ref 8.7–10.5)
CHLORIDE SERPL-SCNC: 103 MMOL/L (ref 95–110)
CLARITY UR: ABNORMAL
CO2 SERPL-SCNC: 23 MMOL/L (ref 23–29)
COLOR UR: YELLOW
CREAT SERPL-MCNC: 0.8 MG/DL (ref 0.5–1.4)
DIFFERENTIAL METHOD: ABNORMAL
EOSINOPHIL # BLD AUTO: 0.1 K/UL (ref 0–0.5)
EOSINOPHIL NFR BLD: 0.4 % (ref 0–8)
ERYTHROCYTE [DISTWIDTH] IN BLOOD BY AUTOMATED COUNT: 13.2 % (ref 11.5–14.5)
EST. GFR  (AFRICAN AMERICAN): >60 ML/MIN/1.73 M^2
EST. GFR  (NON AFRICAN AMERICAN): >60 ML/MIN/1.73 M^2
GLUCOSE SERPL-MCNC: 102 MG/DL (ref 70–110)
GLUCOSE UR QL STRIP: NEGATIVE
HCT VFR BLD AUTO: 43.7 % (ref 40–54)
HGB BLD-MCNC: 15 G/DL (ref 14–18)
HGB UR QL STRIP: ABNORMAL
IMM GRANULOCYTES # BLD AUTO: 0.05 K/UL (ref 0–0.04)
IMM GRANULOCYTES NFR BLD AUTO: 0.3 % (ref 0–0.5)
INFLUENZA A, MOLECULAR: NEGATIVE
INFLUENZA B, MOLECULAR: NEGATIVE
INR PPP: 1.1 (ref 0.8–1.2)
KETONES UR QL STRIP: NEGATIVE
LACTATE SERPL-SCNC: 1.2 MMOL/L (ref 0.5–2.2)
LEUKOCYTE ESTERASE UR QL STRIP: ABNORMAL
LIPASE SERPL-CCNC: 20 U/L (ref 4–60)
LYMPHOCYTES # BLD AUTO: 2.1 K/UL (ref 1–4.8)
LYMPHOCYTES NFR BLD: 13.1 % (ref 18–48)
MAGNESIUM SERPL-MCNC: 1.8 MG/DL (ref 1.6–2.6)
MCH RBC QN AUTO: 31.6 PG (ref 27–31)
MCHC RBC AUTO-ENTMCNC: 34.3 G/DL (ref 32–36)
MCV RBC AUTO: 92 FL (ref 82–98)
MICROSCOPIC COMMENT: ABNORMAL
MONOCYTES # BLD AUTO: 1.4 K/UL (ref 0.3–1)
MONOCYTES NFR BLD: 8.7 % (ref 4–15)
NEUTROPHILS # BLD AUTO: 12.2 K/UL (ref 1.8–7.7)
NEUTROPHILS NFR BLD: 77 % (ref 38–73)
NITRITE UR QL STRIP: POSITIVE
NRBC BLD-RTO: 0 /100 WBC
PH UR STRIP: 6 [PH] (ref 5–8)
PHOSPHATE SERPL-MCNC: 3.1 MG/DL (ref 2.7–4.5)
PLATELET # BLD AUTO: 170 K/UL (ref 150–350)
PMV BLD AUTO: 10.6 FL (ref 9.2–12.9)
POTASSIUM SERPL-SCNC: 4.1 MMOL/L (ref 3.5–5.1)
PROCALCITONIN SERPL IA-MCNC: 0.05 NG/ML
PROT SERPL-MCNC: 7.3 G/DL (ref 6–8.4)
PROT UR QL STRIP: NEGATIVE
PROTHROMBIN TIME: 11.4 SEC (ref 9–12.5)
RBC # BLD AUTO: 4.74 M/UL (ref 4.6–6.2)
RBC #/AREA URNS HPF: 3 /HPF (ref 0–4)
SODIUM SERPL-SCNC: 139 MMOL/L (ref 136–145)
SP GR UR STRIP: 1.02 (ref 1–1.03)
SPECIMEN SOURCE: NORMAL
TROPONIN I SERPL DL<=0.01 NG/ML-MCNC: 0.01 NG/ML (ref 0–0.03)
URN SPEC COLLECT METH UR: ABNORMAL
UROBILINOGEN UR STRIP-ACNC: NEGATIVE EU/DL
WBC # BLD AUTO: 15.87 K/UL (ref 3.9–12.7)
WBC #/AREA URNS HPF: 90 /HPF (ref 0–5)
WBC CLUMPS URNS QL MICRO: ABNORMAL

## 2019-07-20 PROCEDURE — 83605 ASSAY OF LACTIC ACID: CPT | Mod: 91

## 2019-07-20 PROCEDURE — 81000 URINALYSIS NONAUTO W/SCOPE: CPT

## 2019-07-20 PROCEDURE — 87086 URINE CULTURE/COLONY COUNT: CPT

## 2019-07-20 PROCEDURE — 96361 HYDRATE IV INFUSION ADD-ON: CPT

## 2019-07-20 PROCEDURE — 85610 PROTHROMBIN TIME: CPT

## 2019-07-20 PROCEDURE — 93010 ELECTROCARDIOGRAM REPORT: CPT | Mod: ,,, | Performed by: INTERNAL MEDICINE

## 2019-07-20 PROCEDURE — 25000003 PHARM REV CODE 250: Performed by: SURGERY

## 2019-07-20 PROCEDURE — 87184 SC STD DISK METHOD PER PLATE: CPT

## 2019-07-20 PROCEDURE — 96374 THER/PROPH/DIAG INJ IV PUSH: CPT

## 2019-07-20 PROCEDURE — 36415 COLL VENOUS BLD VENIPUNCTURE: CPT

## 2019-07-20 PROCEDURE — 80053 COMPREHEN METABOLIC PANEL: CPT

## 2019-07-20 PROCEDURE — 85025 COMPLETE CBC W/AUTO DIFF WBC: CPT

## 2019-07-20 PROCEDURE — 83735 ASSAY OF MAGNESIUM: CPT

## 2019-07-20 PROCEDURE — 94760 N-INVAS EAR/PLS OXIMETRY 1: CPT

## 2019-07-20 PROCEDURE — 87088 URINE BACTERIA CULTURE: CPT

## 2019-07-20 PROCEDURE — 84484 ASSAY OF TROPONIN QUANT: CPT

## 2019-07-20 PROCEDURE — 83690 ASSAY OF LIPASE: CPT

## 2019-07-20 PROCEDURE — 87502 INFLUENZA DNA AMP PROBE: CPT

## 2019-07-20 PROCEDURE — 87040 BLOOD CULTURE FOR BACTERIA: CPT

## 2019-07-20 PROCEDURE — 83880 ASSAY OF NATRIURETIC PEPTIDE: CPT

## 2019-07-20 PROCEDURE — 87186 SC STD MICRODIL/AGAR DIL: CPT

## 2019-07-20 PROCEDURE — 63600175 PHARM REV CODE 636 W HCPCS: Performed by: SURGERY

## 2019-07-20 PROCEDURE — 93010 EKG 12-LEAD: ICD-10-PCS | Mod: ,,, | Performed by: INTERNAL MEDICINE

## 2019-07-20 PROCEDURE — 93005 ELECTROCARDIOGRAM TRACING: CPT

## 2019-07-20 PROCEDURE — 11000001 HC ACUTE MED/SURG PRIVATE ROOM

## 2019-07-20 PROCEDURE — 84145 PROCALCITONIN (PCT): CPT

## 2019-07-20 PROCEDURE — 87077 CULTURE AEROBIC IDENTIFY: CPT

## 2019-07-20 PROCEDURE — 99285 EMERGENCY DEPT VISIT HI MDM: CPT | Mod: 25

## 2019-07-20 PROCEDURE — 84100 ASSAY OF PHOSPHORUS: CPT

## 2019-07-20 PROCEDURE — 85730 THROMBOPLASTIN TIME PARTIAL: CPT

## 2019-07-20 RX ORDER — LEVOFLOXACIN 5 MG/ML
750 INJECTION, SOLUTION INTRAVENOUS
Status: DISCONTINUED | OUTPATIENT
Start: 2019-07-20 | End: 2019-07-24 | Stop reason: HOSPADM

## 2019-07-20 RX ADMIN — SODIUM CHLORIDE 1000 ML: 0.9 INJECTION, SOLUTION INTRAVENOUS at 07:07

## 2019-07-20 RX ADMIN — LEVOFLOXACIN 750 MG: 750 INJECTION, SOLUTION INTRAVENOUS at 08:07

## 2019-07-21 PROBLEM — G30.9 ALZHEIMER'S DISEASE: Status: ACTIVE | Noted: 2019-07-21

## 2019-07-21 PROBLEM — F02.80 ALZHEIMER'S DISEASE: Status: ACTIVE | Noted: 2019-07-21

## 2019-07-21 LAB
ALBUMIN SERPL BCP-MCNC: 3.1 G/DL (ref 3.5–5.2)
ALP SERPL-CCNC: 117 U/L (ref 55–135)
ALT SERPL W/O P-5'-P-CCNC: 15 U/L (ref 10–44)
ANION GAP SERPL CALC-SCNC: 9 MMOL/L (ref 8–16)
AST SERPL-CCNC: 19 U/L (ref 10–40)
BASOPHILS # BLD AUTO: 0.08 K/UL (ref 0–0.2)
BASOPHILS NFR BLD: 0.5 % (ref 0–1.9)
BILIRUB SERPL-MCNC: 2 MG/DL (ref 0.1–1)
BILIRUB UR QL STRIP: NEGATIVE
BUN SERPL-MCNC: 17 MG/DL (ref 10–30)
CALCIUM SERPL-MCNC: 9.3 MG/DL (ref 8.7–10.5)
CHLORIDE SERPL-SCNC: 104 MMOL/L (ref 95–110)
CLARITY UR: CLEAR
CO2 SERPL-SCNC: 26 MMOL/L (ref 23–29)
COLOR UR: YELLOW
CREAT SERPL-MCNC: 0.8 MG/DL (ref 0.5–1.4)
DIFFERENTIAL METHOD: ABNORMAL
EOSINOPHIL # BLD AUTO: 0.1 K/UL (ref 0–0.5)
EOSINOPHIL NFR BLD: 0.5 % (ref 0–8)
ERYTHROCYTE [DISTWIDTH] IN BLOOD BY AUTOMATED COUNT: 13.1 % (ref 11.5–14.5)
EST. GFR  (AFRICAN AMERICAN): >60 ML/MIN/1.73 M^2
EST. GFR  (NON AFRICAN AMERICAN): >60 ML/MIN/1.73 M^2
GLUCOSE SERPL-MCNC: 95 MG/DL (ref 70–110)
GLUCOSE UR QL STRIP: NEGATIVE
HCT VFR BLD AUTO: 38.7 % (ref 40–54)
HGB BLD-MCNC: 12.9 G/DL (ref 14–18)
HGB UR QL STRIP: ABNORMAL
IMM GRANULOCYTES # BLD AUTO: 0.05 K/UL (ref 0–0.04)
IMM GRANULOCYTES NFR BLD AUTO: 0.3 % (ref 0–0.5)
KETONES UR QL STRIP: NEGATIVE
LACTATE SERPL-SCNC: 1 MMOL/L (ref 0.5–2.2)
LEUKOCYTE ESTERASE UR QL STRIP: NEGATIVE
LYMPHOCYTES # BLD AUTO: 1.9 K/UL (ref 1–4.8)
LYMPHOCYTES NFR BLD: 12.6 % (ref 18–48)
MCH RBC QN AUTO: 31.2 PG (ref 27–31)
MCHC RBC AUTO-ENTMCNC: 33.3 G/DL (ref 32–36)
MCV RBC AUTO: 94 FL (ref 82–98)
MICROSCOPIC COMMENT: ABNORMAL
MONOCYTES # BLD AUTO: 1.6 K/UL (ref 0.3–1)
MONOCYTES NFR BLD: 10.2 % (ref 4–15)
NEUTROPHILS # BLD AUTO: 11.5 K/UL (ref 1.8–7.7)
NEUTROPHILS NFR BLD: 75.9 % (ref 38–73)
NITRITE UR QL STRIP: NEGATIVE
NRBC BLD-RTO: 0 /100 WBC
PH UR STRIP: 6 [PH] (ref 5–8)
PLATELET # BLD AUTO: 153 K/UL (ref 150–350)
PMV BLD AUTO: 10.6 FL (ref 9.2–12.9)
POTASSIUM SERPL-SCNC: 4 MMOL/L (ref 3.5–5.1)
PROT SERPL-MCNC: 6.4 G/DL (ref 6–8.4)
PROT UR QL STRIP: NEGATIVE
RBC # BLD AUTO: 4.13 M/UL (ref 4.6–6.2)
RBC #/AREA URNS HPF: 7 /HPF (ref 0–4)
SODIUM SERPL-SCNC: 139 MMOL/L (ref 136–145)
SP GR UR STRIP: 1.02 (ref 1–1.03)
URN SPEC COLLECT METH UR: ABNORMAL
UROBILINOGEN UR STRIP-ACNC: 1 EU/DL
WBC # BLD AUTO: 15.2 K/UL (ref 3.9–12.7)
WBC #/AREA URNS HPF: 10 /HPF (ref 0–5)

## 2019-07-21 PROCEDURE — 11000001 HC ACUTE MED/SURG PRIVATE ROOM

## 2019-07-21 PROCEDURE — 99222 PR INITIAL HOSPITAL CARE,LEVL II: ICD-10-PCS | Mod: ,,, | Performed by: FAMILY MEDICINE

## 2019-07-21 PROCEDURE — 81000 URINALYSIS NONAUTO W/SCOPE: CPT

## 2019-07-21 PROCEDURE — 25000003 PHARM REV CODE 250: Performed by: SURGERY

## 2019-07-21 PROCEDURE — 63600175 PHARM REV CODE 636 W HCPCS: Performed by: FAMILY MEDICINE

## 2019-07-21 PROCEDURE — 80053 COMPREHEN METABOLIC PANEL: CPT

## 2019-07-21 PROCEDURE — 94761 N-INVAS EAR/PLS OXIMETRY MLT: CPT

## 2019-07-21 PROCEDURE — 36415 COLL VENOUS BLD VENIPUNCTURE: CPT

## 2019-07-21 PROCEDURE — 99222 1ST HOSP IP/OBS MODERATE 55: CPT | Mod: ,,, | Performed by: FAMILY MEDICINE

## 2019-07-21 PROCEDURE — 25000003 PHARM REV CODE 250: Performed by: FAMILY MEDICINE

## 2019-07-21 PROCEDURE — 97161 PT EVAL LOW COMPLEX 20 MIN: CPT | Performed by: PHYSICAL THERAPIST

## 2019-07-21 PROCEDURE — 85025 COMPLETE CBC W/AUTO DIFF WBC: CPT

## 2019-07-21 RX ORDER — SODIUM CHLORIDE 9 MG/ML
INJECTION, SOLUTION INTRAVENOUS CONTINUOUS
Status: DISCONTINUED | OUTPATIENT
Start: 2019-07-21 | End: 2019-07-22

## 2019-07-21 RX ORDER — SOTALOL HYDROCHLORIDE 80 MG/1
40 TABLET ORAL 2 TIMES DAILY
Status: DISCONTINUED | OUTPATIENT
Start: 2019-07-21 | End: 2019-07-24 | Stop reason: HOSPADM

## 2019-07-21 RX ORDER — DOCUSATE SODIUM 100 MG/1
100 CAPSULE, LIQUID FILLED ORAL
Status: DISCONTINUED | OUTPATIENT
Start: 2019-07-21 | End: 2019-07-24 | Stop reason: HOSPADM

## 2019-07-21 RX ORDER — ONDANSETRON 2 MG/ML
4 INJECTION INTRAMUSCULAR; INTRAVENOUS EVERY 8 HOURS PRN
Status: DISCONTINUED | OUTPATIENT
Start: 2019-07-21 | End: 2019-07-24 | Stop reason: HOSPADM

## 2019-07-21 RX ORDER — SODIUM CHLORIDE 0.9 % (FLUSH) 0.9 %
10 SYRINGE (ML) INJECTION
Status: DISCONTINUED | OUTPATIENT
Start: 2019-07-21 | End: 2019-07-24 | Stop reason: HOSPADM

## 2019-07-21 RX ORDER — PANTOPRAZOLE SODIUM 40 MG/1
40 TABLET, DELAYED RELEASE ORAL DAILY
Status: DISCONTINUED | OUTPATIENT
Start: 2019-07-21 | End: 2019-07-24 | Stop reason: HOSPADM

## 2019-07-21 RX ORDER — ATORVASTATIN CALCIUM 40 MG/1
40 TABLET, FILM COATED ORAL DAILY
Status: DISCONTINUED | OUTPATIENT
Start: 2019-07-21 | End: 2019-07-24 | Stop reason: HOSPADM

## 2019-07-21 RX ORDER — ACETAMINOPHEN 325 MG/1
650 TABLET ORAL EVERY 8 HOURS PRN
Status: DISCONTINUED | OUTPATIENT
Start: 2019-07-21 | End: 2019-07-24 | Stop reason: HOSPADM

## 2019-07-21 RX ADMIN — SODIUM CHLORIDE: 0.9 INJECTION, SOLUTION INTRAVENOUS at 01:07

## 2019-07-21 RX ADMIN — SOTALOL HYDROCHLORIDE 40 MG: 80 TABLET ORAL at 09:07

## 2019-07-21 RX ADMIN — APIXABAN 2.5 MG: 2.5 TABLET, FILM COATED ORAL at 08:07

## 2019-07-21 RX ADMIN — LEVOFLOXACIN 750 MG: 750 INJECTION, SOLUTION INTRAVENOUS at 08:07

## 2019-07-21 RX ADMIN — APIXABAN 2.5 MG: 2.5 TABLET, FILM COATED ORAL at 09:07

## 2019-07-21 RX ADMIN — SODIUM CHLORIDE: 0.9 INJECTION, SOLUTION INTRAVENOUS at 02:07

## 2019-07-21 RX ADMIN — PANTOPRAZOLE SODIUM 40 MG: 40 TABLET, DELAYED RELEASE ORAL at 09:07

## 2019-07-21 RX ADMIN — SOTALOL HYDROCHLORIDE 40 MG: 80 TABLET ORAL at 08:07

## 2019-07-21 RX ADMIN — ATORVASTATIN CALCIUM 40 MG: 40 TABLET, FILM COATED ORAL at 09:07

## 2019-07-21 NOTE — H&P
[facility] Medicine  History & Physical    Patient Name: Hernesto Sofia  MRN: [id number]  Admission Date: 7/20/2019  Attending Physician: Denton Polanco MD   Primary Care Provider: Hasmukh Pederson MD         Patient information was obtained from Nurses and ER records.     Subjective:     Principal Problem:Complicated UTI (urinary tract infection)    Chief Complaint:   Chief Complaint   Patient presents with    Fatigue        HPI: Hernesto Sofia presents to the emergency room with fever and confusion  Patient suffers from dementia but is more confused than typical per family  Patient was hard to walk today, was weak and confused at home this p.m.  Patient is alert but obviously demented, no obvious focal deficit noted today  Daughter states that pt has suffered from urinary tract infections in the past  Workup in the emergency room revealed patient had a significant urinary tract infection.  He has had these in the past.  He is going to be admitted for IV antibiotics and therapy.    Past Medical History:   Diagnosis Date    A-fib     Anticoagulant long-term use     Dementia     Hyperlipidemia     Hypertension     Pacemaker        Past Surgical History:   Procedure Laterality Date    CARDIAC PACEMAKER PLACEMENT         Review of patient's allergies indicates:   Allergen Reactions    Pcn [penicillins]        No current facility-administered medications on file prior to encounter.      Current Outpatient Medications on File Prior to Encounter   Medication Sig    albuterol (ACCUNEB) 1.25 mg/3 mL Nebu Take 1.25 mg by nebulization every 6 (six) hours as needed. Rescue    atorvastatin (LIPITOR) 40 MG tablet Take 40 mg by mouth once daily.    docusate sodium (COLACE) 50 MG capsule Take 100 mg by mouth as needed for Constipation (nightly PRN).    ELIQUIS 2.5 mg Tab TAKE 1 TABLET ORALLY 2 TIMES A DAY.    multivitamin (ONE DAILY MULTIVITAMIN) per tablet Take 1 tablet by mouth  once daily.    sotalol (BETAPACE) 80 MG tablet Take 40 mg by mouth 2 (two) times daily.     Family History     None        Tobacco Use    Smoking status: Never Smoker    Smokeless tobacco: Never Used   Substance and Sexual Activity    Alcohol use: No    Drug use: No    Sexual activity: Never     Review of Systems   Unable to perform ROS: Dementia     Objective:     Vital Signs (Most Recent):  Temp: 97.1 °F (36.2 °C) (07/21/19 0728)  Pulse: 98 (07/21/19 0728)  Resp: 20 (07/21/19 0728)  BP: 134/74 (07/21/19 0728)  SpO2: 96 % (07/21/19 0742) Vital Signs (24h Range):  Temp:  [97.1 °F (36.2 °C)-100.3 °F (37.9 °C)] 97.1 °F (36.2 °C)  Pulse:  [67-98] 98  Resp:  [18-26] 20  SpO2:  [95 %-96 %] 96 %  BP: (125-147)/(65-80) 134/74     Weight: 66.9 kg (147 lb 7.8 oz)  Body mass index is 23.1 kg/m².    Physical Exam   Constitutional: He appears well-developed and well-nourished.   Cardiovascular: Normal rate, regular rhythm and normal heart sounds.   No murmur heard.  Pulmonary/Chest: Effort normal and breath sounds normal. No respiratory distress.   Abdominal: Soft. Bowel sounds are normal. He exhibits no distension.   Musculoskeletal: He exhibits no edema or tenderness.   Neurological: He is disoriented. No cranial nerve deficit. Gait abnormal.   Lying in bed.  Pleasantly demented.  He has difficulty understanding questions.           Significant Labs:   CBC:   Recent Labs   Lab 07/20/19 1952 07/21/19  0552   WBC 15.87* 15.20*   HGB 15.0 12.9*   HCT 43.7 38.7*    153     CMP:   Recent Labs   Lab 07/20/19 1952 07/21/19  0552    139   K 4.1 4.0    104   CO2 23 26    95   BUN 18 17   CREATININE 0.8 0.8   CALCIUM 10.0 9.3   PROT 7.3 6.4   ALBUMIN 3.6 3.1*   BILITOT 1.5* 2.0*   ALKPHOS 137* 117   AST 21 19   ALT 19 15   ANIONGAP 13 9   EGFRNONAA >60 >60     Urine Studies:   Recent Labs   Lab 07/20/19 1942   COLORU Yellow   APPEARANCEUA Hazy*   PHUR 6.0   SPECGRAV 1.025   PROTEINUA Negative   GLUCUA  Negative   KETONESU Negative   BILIRUBINUA Negative   OCCULTUA 1+*   NITRITE Positive*   UROBILINOGEN Negative   LEUKOCYTESUR 1+*   RBCUA 3   WBCUA 90*   BACTERIA Many*       Significant Imaging: I have reviewed all pertinent imaging results/findings within the past 24 hours.  I have reviewed and interpreted all pertinent imaging results/findings within the past 24 hours.     CT scan was attempted but patient would not hold still  Chest x-ray shows no active disease in the chest.  No infiltrates.  He does have cardiomegaly and a pacemaker.    Assessment/Plan:     * Complicated UTI (urinary tract infection)  Urine culture in process  Repeat urinalysis today  Levaquin 750 mg IV piggyback Q 24 hr  Continue IV fluids 75 cc/hr      Alzheimer's disease  Patient's DNR status will be continued per family request.      Acute confusional state  Probably from the urinary tract infection.  Hopefully he will be back to his baseline in a few days.      Atrial fibrillation with RVR  Continue Eliquis and sotalol        VTE Risk Mitigation (From admission, onward)        Ordered     apixaban tablet 2.5 mg  2 times daily      07/21/19 0821     IP VTE HIGH RISK PATIENT  Once      07/21/19 0028     Place sequential compression device  Until discontinued      07/21/19 0028             Denton Polanco MD  Department of Hospital Medicine   [facility]

## 2019-07-21 NOTE — HPI
Hernesto Sofia presents to the emergency room with fever and confusion  Patient suffers from dementia but is more confused than typical per family  Patient was hard to walk today, was weak and confused at home this p.m.  Patient is alert but obviously demented, no obvious focal deficit noted today  Daughter states that pt has suffered from urinary tract infections in the past  Workup in the emergency room revealed patient had a significant urinary tract infection.  He has had these in the past.  He is going to be admitted for IV antibiotics and therapy.

## 2019-07-21 NOTE — ASSESSMENT & PLAN NOTE
Probably from the urinary tract infection.  Hopefully he will be back to his baseline in a few days.

## 2019-07-21 NOTE — ED TRIAGE NOTES
Family reports patient is fatigued today. Normally he walks with his walker around the house, however today he is to weak to get out of bed/chair. Family also states that he has altered mental status compared to his baseline.

## 2019-07-21 NOTE — PLAN OF CARE
Problem: Adult Inpatient Plan of Care  Goal: Plan of Care Review  Outcome: Ongoing (interventions implemented as appropriate)  Vitals remained stable, afebrile. No complaints of pain. Pleasantly confused. Daughter at bedside. POA papers placed in chart. Made DNR per POA. UA collected. WBCs 61156. PT added. Family reports pt has not walked in a few weeks due his decline in health. Pt has started pocketing meds and food per daughter. Does need reminder to swallow his food and medicine. Pt is oriented to self only. Incontinent to bowel and bladder. Skin intact. Hard of hearing. Fluids continued. IV abx continued as well. Discussed plan of care with daughter and pt, stated understanding

## 2019-07-21 NOTE — PT/OT/SLP EVAL
Physical Therapy Evaluation    Patient Name:  Hernesto Sofia   MRN:  [id number]    Recommendations:     Discharge Recommendations:  nursing facility, skilled   Discharge Equipment Recommendations: none   Barriers to discharge: Decreased caregiver support    Assessment:     Hernesto Sofia is a 89 y.o. male admitted with a medical diagnosis of Complicated UTI (urinary tract infection).  He presents with the following impairments/functional limitations:  weakness, impaired balance, impaired cognition, impaired endurance, impaired functional mobilty, gait instability, impaired self care skills, decreased lower extremity function, decreased upper extremity function.  Pt requiring min/mod assist for all mobility with poor sequencing, limited cognition, and difficulty following instructions causing safety to be limited.  He will benefit from SNF placemebnt to isatu working on mobility while reducing burden of care..    Rehab Prognosis: Fair; patient would benefit from acute skilled PT services to address these deficits and reach maximum level of function.    Recent Surgery: * No surgery found *      Plan:     During this hospitalization, patient to be seen daily to address the identified rehab impairments via gait training, therapeutic activities, therapeutic exercises and progress toward the following goals:    · Plan of Care Expires:  08/20/19    Subjective     Chief Complaint: weakness  Patient/Family Comments/goals: family members report pt has had multiple UTIs lately and that his mobility status is steadily declining  Pain/Comfort:  · Pain Rating 1: 0/10  · Pain Rating Post-Intervention 1: 0/10    Patients cultural, spiritual, Jew conflicts given the current situation: no    Living Environment:  Lives in a house with sitters during the day and family assisting him at night.    Prior to admission, patients level of function was generally ambulatory with assist and use of a RW.  Lately he has been using aa  transport chair more for mobility in the home due to weakness and imbalance.  Equipment used at home: walker, rolling, wheelchair, bath bench.  DME owned (not currently used): none.  Upon discharge, patient will have assistance from staff at a SNF.    Objective:     Communicated with RN prior to session.  Patient found supine with telemetry, peripheral IV  upon PT entry to room.    General Precautions: Standard, fall   Orthopedic Precautions:N/A   Braces: N/A     Exams:  · Cognitive Exam:  Patient is oriented to Person  · RUE ROM: Deficits: about 90 degrees shoulder flexion and abduction  · LUE ROM: Deficits: about 90 degrees shoulder flexion and abduction  · RLE ROM: Deficits: reduced flexibility in hips and knees noted  · RLE Strength: 3/5 grossly  · LLE ROM: Deficits: reduced flexibility in hips and knees for extension  · LLE Strength: 3/5 grossly    Functional Mobility:  · Bed Mobility:     · Supine to Sit: minimum assistance  · Sit to Supine: moderate assistance  · Transfers:     · Sit to Stand:  minimum assistance with rolling walker  · Gait: 15ft within the room requiring min/mod assist for safety due to poor positioning within RW, impaired balance, and reduced safety  awareness  · Balance: pt able to sit at EOB with fair balance and able to stand with fair to poor balance with RW support      Therapeutic Activities and Exercises:   pt family educated on POC  Pt educated on orientation  Pt and family educated on safety precautions and call bell    AM-PAC 6 CLICK MOBILITY  Total Score:14     Patient left supine with all lines intact, call button in reach and bed alarm on.    GOALS:   Multidisciplinary Problems     Physical Therapy Goals        Problem: Physical Therapy Goal    Goal Priority Disciplines Outcome Goal Variances Interventions   Physical Therapy Goal     PT, PT/OT Ongoing (interventions implemented as appropriate)     Description:  Goals to be met by: 7/31     Patient will increase functional  independence with mobility by performin. Supine to sit with Stand-by Assistance  2. Sit to supine with Stand-by Assistance  3. Sit to stand transfer with Stand-by Assistance with RW  4. Gait  x 50 feet with Contact Guard Assistance using Rolling Walker.                       History:     Past Medical History:   Diagnosis Date    A-fib     Anticoagulant long-term use     Dementia     Hyperlipidemia     Hypertension     Pacemaker        Past Surgical History:   Procedure Laterality Date    CARDIAC PACEMAKER PLACEMENT         Time Tracking:     PT Received On: 19  PT Start Time: 1040     PT Stop Time: 1055  PT Total Time (min): 15 min     Billable Minutes: Evaluation 15      Edson Ronquillo, PT  2019

## 2019-07-21 NOTE — ASSESSMENT & PLAN NOTE
Urine culture in process  Repeat urinalysis today  Levaquin 750 mg IV piggyback Q 24 hr  Continue IV fluids 75 cc/hr

## 2019-07-21 NOTE — PLAN OF CARE
Problem: Physical Therapy Goal  Goal: Physical Therapy Goal  Goals to be met by:      Patient will increase functional independence with mobility by performin. Supine to sit with Stand-by Assistance  2. Sit to supine with Stand-by Assistance  3. Sit to stand transfer with Stand-by Assistance with RW  4. Gait  x 50 feet with Contact Guard Assistance using Rolling Walker.     Outcome: Ongoing (interventions implemented as appropriate)  PT eval completed.  Pt requiring min/mod assist for bed mobility and min/mod assist for transfers and short distance gait within the room.  Will likely require SNF placement.    Edson Ronquillo, PT d  2019

## 2019-07-21 NOTE — ED PROVIDER NOTES
[facility]                                                 Chief Complaint  89 y.o. male with Fatigue    History of Present Illness  Hernesto Sofia presents to the emergency room with fever and confusion  Patient suffers from dementia but is more confused than typical per family  Patient was hard to walk today, was weak and confused at home this p.m.  Patient is alert but obviously demented, no obvious focal deficit noted today  Daughter states that pt has suffered from urinary tract infections in the past    The history is provided by the patient   device was not used during this ER visit  Past medical history: A. fib, hypertension, HLD, pacemaker  Past surgical history: Pacemaker placement and surgery  ALLERGIES: Penicillin     I have reviewed all of this patient's past medical, surgical, family, and social   histories as well as active allergies and medications documented in the  electronic medical record    Review of Systems and Physical Exam      Review of Systems  -- Constitution - fever, denies fatigue, no weakness, no chills  -- Eyes - no tearing or redness, no visual disturbance  -- Ear, Nose - no tinnitus or earache, no nasal congestion or discharge  -- Mouth,Throat - no sore throat, no toothache, normal voice, normal swallowing  -- Respiratory - denies cough and congestion, no shortness of breath, no DONALDSON  -- Cardiovascular - denies chest pain, no palpitations, denies claudication  -- Gastrointestinal - denies abdominal pain, nausea, vomiting, or diarrhea  -- Genitourinary - no dysuria, denies flank pain, no hematuria, no STD risk  -- Musculoskeletal - denies back pain, negative for trauma or injury  -- Neurological - weakness and confusion, no seizure or stroke  -- Skin - denies pallor, rash, or changes in skin. no hives or welts noted  -- Psychiatric - Denies SI or HI, no psychosis or fractured thought noted     Vital Signs  Axillary temperature is 100.3 °F  (37.9 °C).   His blood pressure is 147/80 (abnormal) and his pulse is 89.   His respiration is 20 and oxygen saturation is 95%.     Physical Exam  -- Nursing note and vitals reviewed  -- Constitutional: Appears well-developed and well-nourished  -- Head: Atraumatic. Normocephalic. No obvious abnormality  -- Eyes: Pupils are equal and reactive to light. Normal conjunctiva and lids  -- Cardiac: Normal rate, regular rhythm and normal heart sounds  -- Pulmonary: Normal respiratory effort, breath sounds clear to auscultation  -- Abdominal: Soft, no tenderness. Normal bowel sounds. Normal liver edge  -- Genitourinary: no flank pain on exam, no suprapubic pain by palpation   -- Musculoskeletal: Normal range of motion, no effusions. Joints stable   -- Neurological: No focal deficits. Showed good interaction with staff  -- Vascular: Posterior tibial, dorsalis pedis and radial pulses 2+ bilaterally      Emergency Room Course      Lab Results     K 4.1      CO2 23   BUN 18   CREATININE 0.8      ALKPHOS 137 (H)   AST 21   ALT 19   BILITOT 1.5 (H)   ALBUMIN 3.6   PROT 7.3   WBC 15.87 (H)   HGB 15.0   HCT 43.7      CPK 91   CPK 91   CPKMB 2.1   TROPONINI 0.015   INR 1.1    (H)   DDIMER 0.44   LACTATE 1.2   MG 1.8   TSH 3.306     Urinalysis  -- Urinalysis performed during this ER visit showed signs of infection  -- The urine today has been sent for lab culture, results pending      EKG   -- The EKG findings today were without concerning findings from baseline    Radiology  -- The CT of the head performed in the ER today was negative for acute pathology  -- Chest x-ray showed no infiltrate and showed no acute pathology     Additional Work up  -- Blood cultures have also been drawn, results are pending    Medications Given  levoFLOXacin 750 mg/150 mL IVPB 750 mg (750 mg Intravenous New Bag 7/20/19 2053)   sodium chloride 0.9% bolus 1,000 mL (0 mLs Intravenous Stopped 7/20/19 2048)     MDM  Number of  Diagnoses or Management Options  Altered mental status, unspecified altered mental status type: new, needed workup  Complicated UTI (urinary tract infection): new, needed workup  Fever: new, needed workup     Amount and/or Complexity of Data Reviewed  Clinical lab tests: ordered and reviewed  Tests in the radiology section of CPT®: ordered and reviewed  Tests in the medicine section of CPT®: reviewed and ordered  Obtain history from someone other than the patient: yes  Discuss the patient with other providers: yes    Risk of Complications, Morbidity, and/or Mortality  Presenting problems: high  Diagnostic procedures: high  Management options: high       ED Physician Management  -- Diagnosis management comments: 89 y.o. male with fever tonight  -- patient had fever and altered mental status with a UTI diagnosis now  -- patient has fever better, patient's white count 71023, urine was cultured    Diagnosis  -- Complicated UTI (urinary tract infection).   -- Fever   -- Altered mental status    Disposition and Plan  -- Disposition: admit  -- Condition: stable    This note is dictated on M*Modal word recognition program.  There are word recognition mistakes that are occasionally missed on review.         Cheo Lu MD  07/20/19 0744

## 2019-07-21 NOTE — ED NOTES
Report given to MAJO RN 3rd floor. Pt stable. Patient transported to 3rd floor via stretcher with RN

## 2019-07-21 NOTE — ED NOTES
Children at bedside.  Bed in low and locked position.  2 siderails up.  Call bell in reach.  Voiced understanding of use.  In NAD.  Even and unlabored breathing.  Awaiting provider evaluation.  Resting comfortably.  Patient placed on continuous cardiac monitoring, continuous pulse oximetry, and automatic NIBP.  SL to left forearm per EMS.  Cath urine obtained per EYAD Pappas RN.  Patient/family updated on plan of care.  Verbalized understanding.      Will continue to monitor.

## 2019-07-21 NOTE — SUBJECTIVE & OBJECTIVE
Past Medical History:   Diagnosis Date    A-fib     Anticoagulant long-term use     Dementia     Hyperlipidemia     Hypertension     Pacemaker        Past Surgical History:   Procedure Laterality Date    CARDIAC PACEMAKER PLACEMENT         Review of patient's allergies indicates:   Allergen Reactions    Pcn [penicillins]        No current facility-administered medications on file prior to encounter.      Current Outpatient Medications on File Prior to Encounter   Medication Sig    albuterol (ACCUNEB) 1.25 mg/3 mL Nebu Take 1.25 mg by nebulization every 6 (six) hours as needed. Rescue    atorvastatin (LIPITOR) 40 MG tablet Take 40 mg by mouth once daily.    docusate sodium (COLACE) 50 MG capsule Take 100 mg by mouth as needed for Constipation (nightly PRN).    ELIQUIS 2.5 mg Tab TAKE 1 TABLET ORALLY 2 TIMES A DAY.    multivitamin (ONE DAILY MULTIVITAMIN) per tablet Take 1 tablet by mouth once daily.    sotalol (BETAPACE) 80 MG tablet Take 40 mg by mouth 2 (two) times daily.     Family History     None        Tobacco Use    Smoking status: Never Smoker    Smokeless tobacco: Never Used   Substance and Sexual Activity    Alcohol use: No    Drug use: No    Sexual activity: Never     Review of Systems   Unable to perform ROS: Dementia     Objective:     Vital Signs (Most Recent):  Temp: 97.1 °F (36.2 °C) (07/21/19 0728)  Pulse: 98 (07/21/19 0728)  Resp: 20 (07/21/19 0728)  BP: 134/74 (07/21/19 0728)  SpO2: 96 % (07/21/19 0742) Vital Signs (24h Range):  Temp:  [97.1 °F (36.2 °C)-100.3 °F (37.9 °C)] 97.1 °F (36.2 °C)  Pulse:  [67-98] 98  Resp:  [18-26] 20  SpO2:  [95 %-96 %] 96 %  BP: (125-147)/(65-80) 134/74     Weight: 66.9 kg (147 lb 7.8 oz)  Body mass index is 23.1 kg/m².    Physical Exam   Constitutional: He appears well-developed and well-nourished.   Cardiovascular: Normal rate, regular rhythm and normal heart sounds.   No murmur heard.  Pulmonary/Chest: Effort normal and breath sounds normal. No  respiratory distress.   Abdominal: Soft. Bowel sounds are normal. He exhibits no distension.   Musculoskeletal: He exhibits no edema or tenderness.   Neurological: He is disoriented. No cranial nerve deficit. Gait abnormal.   Lying in bed.  Pleasantly demented.  He has difficulty understanding questions.           Significant Labs:   CBC:   Recent Labs   Lab 07/20/19 1952 07/21/19  0552   WBC 15.87* 15.20*   HGB 15.0 12.9*   HCT 43.7 38.7*    153     CMP:   Recent Labs   Lab 07/20/19 1952 07/21/19  0552    139   K 4.1 4.0    104   CO2 23 26    95   BUN 18 17   CREATININE 0.8 0.8   CALCIUM 10.0 9.3   PROT 7.3 6.4   ALBUMIN 3.6 3.1*   BILITOT 1.5* 2.0*   ALKPHOS 137* 117   AST 21 19   ALT 19 15   ANIONGAP 13 9   EGFRNONAA >60 >60     Urine Studies:   Recent Labs   Lab 07/20/19 1942   COLORU Yellow   APPEARANCEUA Hazy*   PHUR 6.0   SPECGRAV 1.025   PROTEINUA Negative   GLUCUA Negative   KETONESU Negative   BILIRUBINUA Negative   OCCULTUA 1+*   NITRITE Positive*   UROBILINOGEN Negative   LEUKOCYTESUR 1+*   RBCUA 3   WBCUA 90*   BACTERIA Many*       Significant Imaging: I have reviewed all pertinent imaging results/findings within the past 24 hours.  I have reviewed and interpreted all pertinent imaging results/findings within the past 24 hours.     CT scan was attempted but patient would not hold still  Chest x-ray shows no active disease in the chest.  No infiltrates.  He does have cardiomegaly and a pacemaker.

## 2019-07-22 LAB
ALBUMIN SERPL BCP-MCNC: 3.3 G/DL (ref 3.5–5.2)
ALP SERPL-CCNC: 119 U/L (ref 55–135)
ALT SERPL W/O P-5'-P-CCNC: 18 U/L (ref 10–44)
ANION GAP SERPL CALC-SCNC: 8 MMOL/L (ref 8–16)
AST SERPL-CCNC: 20 U/L (ref 10–40)
BASOPHILS # BLD AUTO: 0.07 K/UL (ref 0–0.2)
BASOPHILS NFR BLD: 0.6 % (ref 0–1.9)
BILIRUB SERPL-MCNC: 1.5 MG/DL (ref 0.1–1)
BUN SERPL-MCNC: 12 MG/DL (ref 10–30)
CALCIUM SERPL-MCNC: 9.6 MG/DL (ref 8.7–10.5)
CHLORIDE SERPL-SCNC: 103 MMOL/L (ref 95–110)
CO2 SERPL-SCNC: 26 MMOL/L (ref 23–29)
CREAT SERPL-MCNC: 0.8 MG/DL (ref 0.5–1.4)
DIFFERENTIAL METHOD: ABNORMAL
EOSINOPHIL # BLD AUTO: 0.1 K/UL (ref 0–0.5)
EOSINOPHIL NFR BLD: 1 % (ref 0–8)
ERYTHROCYTE [DISTWIDTH] IN BLOOD BY AUTOMATED COUNT: 12.9 % (ref 11.5–14.5)
EST. GFR  (AFRICAN AMERICAN): >60 ML/MIN/1.73 M^2
EST. GFR  (NON AFRICAN AMERICAN): >60 ML/MIN/1.73 M^2
GLUCOSE SERPL-MCNC: 111 MG/DL (ref 70–110)
HCT VFR BLD AUTO: 41.6 % (ref 40–54)
HGB BLD-MCNC: 14.1 G/DL (ref 14–18)
IMM GRANULOCYTES # BLD AUTO: 0.04 K/UL (ref 0–0.04)
IMM GRANULOCYTES NFR BLD AUTO: 0.3 % (ref 0–0.5)
LYMPHOCYTES # BLD AUTO: 1.4 K/UL (ref 1–4.8)
LYMPHOCYTES NFR BLD: 11.2 % (ref 18–48)
MCH RBC QN AUTO: 31.5 PG (ref 27–31)
MCHC RBC AUTO-ENTMCNC: 33.9 G/DL (ref 32–36)
MCV RBC AUTO: 93 FL (ref 82–98)
MONOCYTES # BLD AUTO: 1.1 K/UL (ref 0.3–1)
MONOCYTES NFR BLD: 9 % (ref 4–15)
NEUTROPHILS # BLD AUTO: 9.5 K/UL (ref 1.8–7.7)
NEUTROPHILS NFR BLD: 77.9 % (ref 38–73)
NRBC BLD-RTO: 0 /100 WBC
PLATELET # BLD AUTO: 160 K/UL (ref 150–350)
PMV BLD AUTO: 10.2 FL (ref 9.2–12.9)
POTASSIUM SERPL-SCNC: 4.1 MMOL/L (ref 3.5–5.1)
PROT SERPL-MCNC: 7 G/DL (ref 6–8.4)
RBC # BLD AUTO: 4.48 M/UL (ref 4.6–6.2)
SODIUM SERPL-SCNC: 137 MMOL/L (ref 136–145)
WBC # BLD AUTO: 12.21 K/UL (ref 3.9–12.7)

## 2019-07-22 PROCEDURE — 99232 SBSQ HOSP IP/OBS MODERATE 35: CPT | Mod: ,,, | Performed by: INTERNAL MEDICINE

## 2019-07-22 PROCEDURE — 25000003 PHARM REV CODE 250: Performed by: FAMILY MEDICINE

## 2019-07-22 PROCEDURE — 94761 N-INVAS EAR/PLS OXIMETRY MLT: CPT

## 2019-07-22 PROCEDURE — 94640 AIRWAY INHALATION TREATMENT: CPT

## 2019-07-22 PROCEDURE — 36415 COLL VENOUS BLD VENIPUNCTURE: CPT

## 2019-07-22 PROCEDURE — 11000001 HC ACUTE MED/SURG PRIVATE ROOM

## 2019-07-22 PROCEDURE — 97530 THERAPEUTIC ACTIVITIES: CPT

## 2019-07-22 PROCEDURE — 25000242 PHARM REV CODE 250 ALT 637 W/ HCPCS: Performed by: INTERNAL MEDICINE

## 2019-07-22 PROCEDURE — 63600175 PHARM REV CODE 636 W HCPCS: Performed by: FAMILY MEDICINE

## 2019-07-22 PROCEDURE — 86580 TB INTRADERMAL TEST: CPT | Performed by: INTERNAL MEDICINE

## 2019-07-22 PROCEDURE — 99232 PR SUBSEQUENT HOSPITAL CARE,LEVL II: ICD-10-PCS | Mod: ,,, | Performed by: INTERNAL MEDICINE

## 2019-07-22 PROCEDURE — 85025 COMPLETE CBC W/AUTO DIFF WBC: CPT

## 2019-07-22 PROCEDURE — 30200315 PPD INTRADERMAL TEST REV CODE 302: Performed by: INTERNAL MEDICINE

## 2019-07-22 PROCEDURE — 80053 COMPREHEN METABOLIC PANEL: CPT

## 2019-07-22 RX ORDER — IPRATROPIUM BROMIDE AND ALBUTEROL SULFATE 2.5; .5 MG/3ML; MG/3ML
3 SOLUTION RESPIRATORY (INHALATION) EVERY 4 HOURS PRN
Status: DISCONTINUED | OUTPATIENT
Start: 2019-07-22 | End: 2019-07-23

## 2019-07-22 RX ADMIN — TUBERCULIN PURIFIED PROTEIN DERIVATIVE 5 UNITS: 5 INJECTION INTRADERMAL at 02:07

## 2019-07-22 RX ADMIN — SOTALOL HYDROCHLORIDE 40 MG: 80 TABLET ORAL at 08:07

## 2019-07-22 RX ADMIN — ACETAMINOPHEN 650 MG: 325 TABLET ORAL at 11:07

## 2019-07-22 RX ADMIN — APIXABAN 2.5 MG: 2.5 TABLET, FILM COATED ORAL at 08:07

## 2019-07-22 RX ADMIN — ATORVASTATIN CALCIUM 40 MG: 40 TABLET, FILM COATED ORAL at 09:07

## 2019-07-22 RX ADMIN — SOTALOL HYDROCHLORIDE 40 MG: 80 TABLET ORAL at 09:07

## 2019-07-22 RX ADMIN — SODIUM CHLORIDE: 0.9 INJECTION, SOLUTION INTRAVENOUS at 06:07

## 2019-07-22 RX ADMIN — ACETAMINOPHEN 650 MG: 325 TABLET ORAL at 09:07

## 2019-07-22 RX ADMIN — LEVOFLOXACIN 750 MG: 750 INJECTION, SOLUTION INTRAVENOUS at 08:07

## 2019-07-22 RX ADMIN — PANTOPRAZOLE SODIUM 40 MG: 40 TABLET, DELAYED RELEASE ORAL at 09:07

## 2019-07-22 RX ADMIN — APIXABAN 2.5 MG: 2.5 TABLET, FILM COATED ORAL at 09:07

## 2019-07-22 RX ADMIN — IPRATROPIUM BROMIDE AND ALBUTEROL SULFATE 3 ML: .5; 3 SOLUTION RESPIRATORY (INHALATION) at 09:07

## 2019-07-22 NOTE — PLAN OF CARE
Problem: Adult Inpatient Plan of Care  Goal: Plan of Care Review  Outcome: Ongoing (interventions implemented as appropriate)  Patient aware of plan of care. Max temp 100.3, tylenol given. Other VS stable. Frequent repositioning. Discontinued fluids. Awaiting urine sensitivity. Accepted at [facility], waiting for approval from insurance. PPD given to right forearm. Sitter at bedside. Free from falls/injuries. No questions or concerns at this time. Agrees with plan of care.

## 2019-07-22 NOTE — HOSPITAL COURSE
7/22 he was placed inpatient on levaquin for UTI. Urine culture growing gram - but sensitivities pending. Low grade temps 99 over last 24hr. WBC better this am 89454 on admission to now 45964. Family at bedside reports AMS back to baseline    7/23 He is currently on levaquin. T max 100.7 yesterday 7pm. WBC down to normal 22050>73490.     Last night poss aspiration. Tachypnia, POX low 90's. Nebs ordered. CXR shows left upper infiltrate. He is now NPO with speech consult this am.   Family at bedside denies that he chokes with eating. Talked to his daughter explained .    7/24 he remains on levaquin for ecoli sensitive UTI. Afebrile overnight and no elevated WBC.     Also now on modified diet after speech saw him for poss aspiration. Clinda added as CXR showed infiltrates. Also nebs started.     Plan is for d/c to [place]. Will need cont therapy as he is more dependent with max assist for transfers

## 2019-07-22 NOTE — PLAN OF CARE
07/22/19 1303   Discharge Reassessment   Assessment Type Discharge Planning Assessment   Provided patient/caregiver education on the expected discharge date and the discharge plan Yes   Do you have any problems affording any of your prescribed medications? No   Discharge Plan A Skilled Nursing Facility   Discharge Plan B New Nursing Home placement - detention care facility   DME Needed Upon Discharge  none   Anticipated Discharge Disposition SNF   Can the patient answer the patient profile reliably? Yes, cognitively intact   How does the patient rate their overall health at the present time? Poor   Describe the patient's ability to walk at the present time. Major restrictions/daily assistance from another person   How often would a person be available to care for the patient? Whenever needed   Number of comorbid conditions (as recorded on the chart) Three   During the past month, has the patient often been bothered by feeling down, depressed or hopeless? No   During the past month, has the patient often been bothered by little interest or pleasure in doing things? No   Post-Acute Status   Post-Acute Authorization Placement   Post-Acute Placement Status Pending Payor Review   Discharge Delays None known at this time         Patient admitted for UTI. Patient lives at home and has sitters. Patient has been falling more frequently at home. Daughter is looking for nursing home placement. SW sent referral to [facility]. Patient has been medically accepted, but pending payor review. Patient will remain for continued treatment today. No needs or concerns voiced at this time. CM will continue to follow and assist as needed.

## 2019-07-22 NOTE — PROGRESS NOTES
Spoke to Kurt Hernesto about the purpose of pharmacy education. Patient states he has no n/v/d from abx. States he has no pain. We discusses s/sx of bleeding  (pt on blood thinner), antiemetcs, and fall precautions. Patient verbalized understanding.

## 2019-07-22 NOTE — PT/OT/SLP PROGRESS
"Physical Therapy Treatment    Patient Name:  Hernesto Sofia   MRN:  [id number]    Recommendations:     Discharge Recommendations:  nursing facility, skilled   Discharge Equipment Recommendations: none   Barriers to discharge: Decreased caregiver support    Assessment:     Hernesto Sofia is a 89 y.o. male admitted with a medical diagnosis of Complicated UTI (urinary tract infection).  He presents with the following impairments/functional limitations:  weakness, impaired endurance, impaired cognition, impaired balance, decreased upper extremity function, decreased lower extremity function, impaired self care skills, impaired functional mobilty, gait instability. Patient noted with difficulty following instructions and  poor sequencing with mobility with resistive movt that leans/falls to to left side upon static sitting on edge of the bed and static standing with RW. Patient requires extensive assistance with mobility and  constant coaxing with verbal and tactile cues to correct body alignment.    Rehab Prognosis: Fair; patient would benefit from acute skilled PT services to address these deficits and reach maximum level of function.    Recent Surgery: * No surgery found *      Plan:     During this hospitalization, patient to be seen daily to address the identified rehab impairments via gait training, therapeutic activities, therapeutic exercises and progress toward the following goals:    · Plan of Care Expires:  08/20/19    Subjective     Chief Complaint: Patient complain headache and feeling tired during sitting up on the chair at bedside - per sitter.  Patient/Family Comments/goals: "To learn to get up and enjoy sitting up".  Pain/Comfort:  · Pain Rating 1: 0/10  · Pain Rating Post-Intervention 1: 0/10      Objective:     Communicated with patient and sitter(Anastasiia) prior to session.  Patient found HOB elevated with peripheral IV, telemetry upon PT entry to room.     General Precautions: Standard, fall "   Orthopedic Precautions:N/A   Braces: N/A     Functional Mobility:  · Bed Mobility:     · Rolling Left:  moderate assistance  · Rolling Right: moderate assistance  · Supine to Sit: maximal assistance  · Sit to Supine: maximal assistance  · Transfers:     · Sit to Stand:  maximal assistance with rolling walker  · Bed to Chair: maximal assistance with  hand-held assist  using  Squat Pivot  · Balance: Static Sitting with Poor- grade and leans to Left side with resistance; Static Standing using RW with Poor- grade and leans to Right side with resistance.  Limited Static sit at edge of the bed for 1-2 minutes and Static Stand using RW for 10-15 secs with Max Assistance and constaant coaxing and cues.       AM-PAC 6 CLICK MOBILITY  Turning over in bed (including adjusting bedclothes, sheets and blankets)?: 2  Sitting down on and standing up from a chair with arms (e.g., wheelchair, bedside commode, etc.): 2  Moving from lying on back to sitting on the side of the bed?: 2  Moving to and from a bed to a chair (including a wheelchair)?: 2  Need to walk in hospital room?: 1  Climbing 3-5 steps with a railing?: 1  Basic Mobility Total Score: 10       Therapeutic Activities and Exercises:   Worked on body sequencing with mobility provided with constant coaxing and verbal/tactile cues, Sitting and Static Stand balance trng using assistive device such as RW and Squat pivot transfer trng. Patient tolerated sitting up bedside chair for 1 hr.and requested to get back to bed.    Patient left HOB elevated with all lines intact, call button in reach, bed alarm on, Nurse Shell notified and Daniel- Anastasiia present..    GOALS:   Multidisciplinary Problems     Physical Therapy Goals        Problem: Physical Therapy Goal    Goal Priority Disciplines Outcome Goal Variances Interventions   Physical Therapy Goal     PT, PT/OT Ongoing (interventions implemented as appropriate)     Description:  Goals to be met by: 7/31     Patient will increase  functional independence with mobility by performin. Supine to sit with Stand-by Assistance  2. Sit to supine with Stand-by Assistance  3. Sit to stand transfer with Stand-by Assistance with RW  4. Gait  x 50 feet with Contact Guard Assistance using Rolling Walker.                       Time Tracking:     PT Received On: 19  PT Start Time: 1220     PT Stop Time: 1252  PT Total Time (min): 32 min     Billable Minutes: Therapeutic Activity 30    Treatment Type: Treatment  PT/PTA: PT           Reece Melara, PT  2019

## 2019-07-22 NOTE — PLAN OF CARE
Problem: Adult Inpatient Plan of Care  Goal: Plan of Care Review  Outcome: Ongoing (interventions implemented as appropriate)  Patient resting with no complaints at this time. Some confusion noted but family indicates this is his baseline. A&O to person only. VS stable. IV fluids and Levaquin administered as ordered. Incontinence care done routinely throughout shift. No acute changes noted. Patient free from falls/injury. Plan of care reviewed and followed.

## 2019-07-22 NOTE — PLAN OF CARE
07/22/19 1100   Post-Acute Status   Post-Acute Authorization Placement   Post-Acute Placement Status Pending State Certification     PASRR faxed and LOCET called into the Office of Aging and Adult Services. Awaiting [phone] received.      Daya Galvan LMSW

## 2019-07-22 NOTE — PROGRESS NOTES
Staff Handoff    Bedside report received from MADDIE Wagner. VS stable. Afebrile. No distress noted. Assessment complete per flowsheet. Call bell in reach. Instructed to call for any needs. Will continue to monitor for any change in status.  Resident Handoff

## 2019-07-22 NOTE — PLAN OF CARE
07/22/19 1007   Discharge Assessment   Assessment Type Discharge Planning Assessment   Confirmed/corrected address and phone number on facesheet? Yes   Assessment information obtained from? Caregiver;Patient   Prior to hospitilization cognitive status: Not Oriented to Place;Not Oriented to Time   Prior to hospitalization functional status: Partially Dependent   Current cognitive status: Not Oriented to Place;Not Oriented to Time   Current Functional Status: Partially Dependent   Facility Arrived From: Home   Lives With alone  (M-F sitters during the day; children rotate schedule during the  night.)   Able to Return to Prior Arrangements no  (Daughter (Cecile) requesting NH placement due to increased caregiver needs and financial reasons,)   Is patient able to care for self after discharge? No   Who are your caregiver(s) and their phone number(s)? Cecile Sofia (Daughter) [phone]   Patient's perception of discharge disposition other (comments)  (Patient not oriented to place--believes he is at home right now.)   Readmission Within the Last 30 Days no previous admission in last 30 days   Patient currently being followed by outpatient case management? No   Patient currently receives any other outside agency services? Yes   Name and contact number of agency or person providing outside services [facility]   Is it the patient/care giver preference to resume care with the current outside agency? Yes   Equipment Currently Used at Home wheelchair;walker, rolling;bedside commode;bath bench;raised toilet   Do you have any problems affording any of your prescribed medications? No   Does the patient have transportation home? Yes   Transportation Anticipated family or friend will provide;other (see comments)  (NH transportation pending acceptance.)   Discharge Plan A New Nursing Home placement - skilled nursing care facility   DME Needed Upon Discharge  none   Patient/Family in Agreement with Plan  yes       SW spoke with patient's POA and daughter, Cecile Sofia, over the phone. Ms. Sofia is wanting NH placement for patient after DC. Ms. Sofia has been speaking with Suyapa at the [facility] and this would be the first NH choice for the patient. [facility] completed a PASRR and faxed to the Office of Aging and Adult Services. LOCET to be called in pending PT and OT note from this morning.     Daya Galvan LMSW

## 2019-07-22 NOTE — PROGRESS NOTES
[facility] Medicine  Progress Note    Patient Name: Hernesto Sofia  MRN: [id number]  Patient Class: IP- Inpatient   Admission Date: 7/20/2019  Length of Stay: 2 days  Attending Physician: Denton Polanco MD  Primary Care Provider: Hasmukh Pederson MD        Subjective:     Principal Problem:Complicated UTI (urinary tract infection)      HPI:  Hernesto Sofia presents to the emergency room with fever and confusion  Patient suffers from dementia but is more confused than typical per family  Patient was hard to walk today, was weak and confused at home this p.m.  Patient is alert but obviously demented, no obvious focal deficit noted today  Daughter states that pt has suffered from urinary tract infections in the past  Workup in the emergency room revealed patient had a significant urinary tract infection.  He has had these in the past.  He is going to be admitted for IV antibiotics and therapy.    Overview/Hospital Course:  7/22 he was placed inpatient on levaquin for UTI. Urine culture growing gram - but sensitivities pending. Low grade temps 99 over last 24hr. WBC better this am 08778 on admission to now 64572. Family at bedside reports AMS back to baseline    Interval note: AMS resolved. WBC better with levaquin as well as u/a. Following cultures   Review of Systems   Unable to perform ROS: Dementia     Objective:     Vital Signs (Most Recent):  Temp: 99 °F (37.2 °C) (07/22/19 0723)  Pulse: 87 (07/22/19 0723)  Resp: 18 (07/22/19 0723)  BP: (!) 152/86 (07/22/19 0723)  SpO2: 97 % (07/22/19 0729) Vital Signs (24h Range):  Temp:  [97.1 °F (36.2 °C)-99 °F (37.2 °C)] 99 °F (37.2 °C)  Pulse:  [] 87  Resp:  [15-18] 18  SpO2:  [94 %-97 %] 97 %  BP: (130-165)/(65-86) 152/86     Weight: 66.9 kg (147 lb 7.8 oz)  Body mass index is 23.1 kg/m².    Physical Exam   Constitutional: He appears well-developed and well-nourished. No distress.   HENT:   Head: Normocephalic and atraumatic.    Right Ear: External ear normal.   Left Ear: External ear normal.   Eyes: Pupils are equal, round, and reactive to light. Conjunctivae and EOM are normal.   Neck: Neck supple. No tracheal deviation present.   Cardiovascular: Normal rate, regular rhythm and normal heart sounds.   No murmur heard.  Pulmonary/Chest: Effort normal and breath sounds normal. No respiratory distress.   Abdominal: Soft. Bowel sounds are normal. He exhibits no distension.   Musculoskeletal: He exhibits no edema or tenderness.   Neurological: He is alert. No cranial nerve deficit.   Lying in bed eating breakfast.  Pleasantly demented.     Nursing note and vitals reviewed.        CRANIAL NERVES     CN III, IV, VI   Pupils are equal, round, and reactive to light.  Extraocular motions are normal.        Significant Labs:   CBC:   Recent Labs   Lab 07/20/19 1952 07/21/19  0552   WBC 15.87* 15.20*   HGB 15.0 12.9*   HCT 43.7 38.7*    153     CMP:   Recent Labs   Lab 07/20/19 1952 07/21/19  0552    139   K 4.1 4.0    104   CO2 23 26    95   BUN 18 17   CREATININE 0.8 0.8   CALCIUM 10.0 9.3   PROT 7.3 6.4   ALBUMIN 3.6 3.1*   BILITOT 1.5* 2.0*   ALKPHOS 137* 117   AST 21 19   ALT 19 15   ANIONGAP 13 9   EGFRNONAA >60 >60   phos 3.1  Mag 1.8  Urine Studies:   Recent Labs   Lab 07/20/19 1942 07/21/19  1505   COLORU Yellow Yellow   APPEARANCEUA Hazy* Clear   PHUR 6.0 6.0   SPECGRAV 1.025 1.025   PROTEINUA Negative Negative   GLUCUA Negative Negative   KETONESU Negative Negative   BILIRUBINUA Negative Negative   OCCULTUA 1+* 1+*   NITRITE Positive* Negative   UROBILINOGEN Negative 1.0   LEUKOCYTESUR 1+* Negative   RBCUA 3 7*   WBCUA 90* 10*   BACTERIA Many*  --      Lactic 1.2>1.0  Lipase 20.   (looks like his baseline is ~400)  Lab Results   Component Value Date    INR 1.1 07/20/2019    INR 1.1 07/01/2019    INR 1.1 06/16/2019         pro calcitonin 0.05  Recent Labs   Lab 07/20/19 1952   TROPONINI 0.015     Urine  Culture, Routine Abnormal    GRAM NEGATIVE MEAGAN   >100,000 cfu/ml   Identification and susceptibility pending         Blood cultures x 2 NGTD    Flu negative      Significant Imaging:    CT head   1. There is no acute abnormality.  There is no hemorrhage, mass effect or obvious acute edema or ischemia.  2. There is moderate volume loss, ventriculomegaly and nonspecific white matter change.  3. Mild chronic sinus disease.    CXR   1. Minimal left basilar atelectasis may be present.  Otherwise, there is no acute abnormality or change compared to the prior study.    EKG   Atrial fibrillation  Left axis deviation  Incomplete right bundle branch block  Nonspecific ST and T wave abnormality  Abnormal ECG  When compared with ECG of 01-JUL-2019 09:10,  No significant change was found  Confirmed by FABIAN RAMIREZ MD ([id number]) on 7/21/2019 1:14:11 PM      Assessment/Plan:      * Complicated UTI (urinary tract infection)  Urine culture in process  Repeat urinalysis yesterday better  Levaquin 750 mg IV piggyback Q 24 hr  Continue IV fluids 75 cc/hr- d/c today as bp elevated    Alzheimer's disease  Patient's DNR status will be continued per family request.      Acute confusional state  Probably from the urinary tract infection.    Back to baseline this am  Cont antibx    HTN (hypertension)  Cont sotalol  D/c ivf      HLD (hyperlipidemia)  Cont statin      Atrial fibrillation with RVR  Continue Eliquis and sotalol  HR 87-92        VTE Risk Mitigation (From admission, onward)        Ordered     apixaban tablet 2.5 mg  2 times daily      07/21/19 0821     IP VTE HIGH RISK PATIENT  Once      07/21/19 0028     Place sequential compression device  Until discontinued      07/21/19 0028                Roxanne Pandey MD  Department of Hospital Medicine   [facility]

## 2019-07-22 NOTE — PLAN OF CARE
07/22/19 1100   Post-Acute Status   Post-Acute Authorization Placement   Post-Acute Placement Status Referrals Sent       Documentation sent via [email or web address] ([employer]) to family's first choice NH, [facility].     Daya Galvan LMSW

## 2019-07-22 NOTE — PLAN OF CARE
07/22/19 1027   Medicare Message   Important Message from Medicare regarding Discharge Appeal Rights Given to patient/caregiver;Explained to patient/caregiver;Signed/date by patient/caregiver   Date IMM was signed 07/20/19   Time IMM was signed [id number]

## 2019-07-22 NOTE — SUBJECTIVE & OBJECTIVE
Interval note: AMS resolved. WBC better with levaquin as well as u/a. Following cultures   Review of Systems   Unable to perform ROS: Dementia     Objective:     Vital Signs (Most Recent):  Temp: 99 °F (37.2 °C) (07/22/19 0723)  Pulse: 87 (07/22/19 0723)  Resp: 18 (07/22/19 0723)  BP: (!) 152/86 (07/22/19 0723)  SpO2: 97 % (07/22/19 0729) Vital Signs (24h Range):  Temp:  [97.1 °F (36.2 °C)-99 °F (37.2 °C)] 99 °F (37.2 °C)  Pulse:  [] 87  Resp:  [15-18] 18  SpO2:  [94 %-97 %] 97 %  BP: (130-165)/(65-86) 152/86     Weight: 66.9 kg (147 lb 7.8 oz)  Body mass index is 23.1 kg/m².    Physical Exam   Constitutional: He appears well-developed and well-nourished. No distress.   HENT:   Head: Normocephalic and atraumatic.   Right Ear: External ear normal.   Left Ear: External ear normal.   Eyes: Pupils are equal, round, and reactive to light. Conjunctivae and EOM are normal.   Neck: Neck supple. No tracheal deviation present.   Cardiovascular: Normal rate, regular rhythm and normal heart sounds.   No murmur heard.  Pulmonary/Chest: Effort normal and breath sounds normal. No respiratory distress.   Abdominal: Soft. Bowel sounds are normal. He exhibits no distension.   Musculoskeletal: He exhibits no edema or tenderness.   Neurological: He is alert. No cranial nerve deficit.   Lying in bed eating breakfast.  Pleasantly demented.     Nursing note and vitals reviewed.        CRANIAL NERVES     CN III, IV, VI   Pupils are equal, round, and reactive to light.  Extraocular motions are normal.        Significant Labs:   CBC:   Recent Labs   Lab 07/20/19 1952 07/21/19  0552   WBC 15.87* 15.20*   HGB 15.0 12.9*   HCT 43.7 38.7*    153     CMP:   Recent Labs   Lab 07/20/19 1952 07/21/19  0552    139   K 4.1 4.0    104   CO2 23 26    95   BUN 18 17   CREATININE 0.8 0.8   CALCIUM 10.0 9.3   PROT 7.3 6.4   ALBUMIN 3.6 3.1*   BILITOT 1.5* 2.0*   ALKPHOS 137* 117   AST 21 19   ALT 19 15   ANIONGAP 13 9    EGFRNONAA >60 >60   phos 3.1  Mag 1.8  Urine Studies:   Recent Labs   Lab 07/20/19 1942 07/21/19  1505   COLORU Yellow Yellow   APPEARANCEUA Hazy* Clear   PHUR 6.0 6.0   SPECGRAV 1.025 1.025   PROTEINUA Negative Negative   GLUCUA Negative Negative   KETONESU Negative Negative   BILIRUBINUA Negative Negative   OCCULTUA 1+* 1+*   NITRITE Positive* Negative   UROBILINOGEN Negative 1.0   LEUKOCYTESUR 1+* Negative   RBCUA 3 7*   WBCUA 90* 10*   BACTERIA Many*  --      Lactic 1.2>1.0  Lipase 20.   (looks like his baseline is ~400)  Lab Results   Component Value Date    INR 1.1 07/20/2019    INR 1.1 07/01/2019    INR 1.1 06/16/2019         pro calcitonin 0.05  Recent Labs   Lab 07/20/19 1952   TROPONINI 0.015     Urine Culture, Routine Abnormal    GRAM NEGATIVE MEAGAN   >100,000 cfu/ml   Identification and susceptibility pending         Blood cultures x 2 NGTD    Flu negative      Significant Imaging:    CT head   1. There is no acute abnormality.  There is no hemorrhage, mass effect or obvious acute edema or ischemia.  2. There is moderate volume loss, ventriculomegaly and nonspecific white matter change.  3. Mild chronic sinus disease.    CXR   1. Minimal left basilar atelectasis may be present.  Otherwise, there is no acute abnormality or change compared to the prior study.    EKG   Atrial fibrillation  Left axis deviation  Incomplete right bundle branch block  Nonspecific ST and T wave abnormality  Abnormal ECG  When compared with ECG of 01-JUL-2019 09:10,  No significant change was found  Confirmed by ASHLEY GONZALEZ, FABINA ([id number]) on 7/21/2019 1:14:11 PM

## 2019-07-22 NOTE — PLAN OF CARE
07/22/19 1309   Advance Directives (For Healthcare)   Advance Directive  (If Adv Dir status is received, view document under Code in header or Chart Review Media tab) Advance Directive currently in Epic.

## 2019-07-22 NOTE — ASSESSMENT & PLAN NOTE
Urine culture in process  Repeat urinalysis yesterday better  Levaquin 750 mg IV piggyback Q 24 hr  Continue IV fluids 75 cc/hr- d/c today as bp elevated

## 2019-07-23 PROBLEM — J69.0 ASPIRATION PNEUMONIA: Status: ACTIVE | Noted: 2019-07-23

## 2019-07-23 LAB
ALBUMIN SERPL BCP-MCNC: 2.8 G/DL (ref 3.5–5.2)
ALP SERPL-CCNC: 108 U/L (ref 55–135)
ALT SERPL W/O P-5'-P-CCNC: 15 U/L (ref 10–44)
ANION GAP SERPL CALC-SCNC: 10 MMOL/L (ref 8–16)
AST SERPL-CCNC: 19 U/L (ref 10–40)
BACTERIA UR CULT: ABNORMAL
BASOPHILS # BLD AUTO: 0.06 K/UL (ref 0–0.2)
BASOPHILS NFR BLD: 0.6 % (ref 0–1.9)
BILIRUB SERPL-MCNC: 1.4 MG/DL (ref 0.1–1)
BUN SERPL-MCNC: 15 MG/DL (ref 10–30)
CALCIUM SERPL-MCNC: 9.4 MG/DL (ref 8.7–10.5)
CHLORIDE SERPL-SCNC: 104 MMOL/L (ref 95–110)
CO2 SERPL-SCNC: 23 MMOL/L (ref 23–29)
CREAT SERPL-MCNC: 0.8 MG/DL (ref 0.5–1.4)
DIFFERENTIAL METHOD: ABNORMAL
EOSINOPHIL # BLD AUTO: 0.2 K/UL (ref 0–0.5)
EOSINOPHIL NFR BLD: 1.8 % (ref 0–8)
ERYTHROCYTE [DISTWIDTH] IN BLOOD BY AUTOMATED COUNT: 12.9 % (ref 11.5–14.5)
EST. GFR  (AFRICAN AMERICAN): >60 ML/MIN/1.73 M^2
EST. GFR  (NON AFRICAN AMERICAN): >60 ML/MIN/1.73 M^2
GLUCOSE SERPL-MCNC: 94 MG/DL (ref 70–110)
HCT VFR BLD AUTO: 37.6 % (ref 40–54)
HGB BLD-MCNC: 12.9 G/DL (ref 14–18)
IMM GRANULOCYTES # BLD AUTO: 0.04 K/UL (ref 0–0.04)
IMM GRANULOCYTES NFR BLD AUTO: 0.4 % (ref 0–0.5)
LYMPHOCYTES # BLD AUTO: 2.1 K/UL (ref 1–4.8)
LYMPHOCYTES NFR BLD: 19.2 % (ref 18–48)
MCH RBC QN AUTO: 31.5 PG (ref 27–31)
MCHC RBC AUTO-ENTMCNC: 34.3 G/DL (ref 32–36)
MCV RBC AUTO: 92 FL (ref 82–98)
MONOCYTES # BLD AUTO: 1.3 K/UL (ref 0.3–1)
MONOCYTES NFR BLD: 11.8 % (ref 4–15)
NEUTROPHILS # BLD AUTO: 7.2 K/UL (ref 1.8–7.7)
NEUTROPHILS NFR BLD: 66.2 % (ref 38–73)
NRBC BLD-RTO: 0 /100 WBC
PLATELET # BLD AUTO: 145 K/UL (ref 150–350)
PMV BLD AUTO: 10.6 FL (ref 9.2–12.9)
POTASSIUM SERPL-SCNC: 3.7 MMOL/L (ref 3.5–5.1)
PROT SERPL-MCNC: 6.4 G/DL (ref 6–8.4)
RBC # BLD AUTO: 4.09 M/UL (ref 4.6–6.2)
SODIUM SERPL-SCNC: 137 MMOL/L (ref 136–145)
WBC # BLD AUTO: 10.81 K/UL (ref 3.9–12.7)

## 2019-07-23 PROCEDURE — 25000003 PHARM REV CODE 250: Performed by: FAMILY MEDICINE

## 2019-07-23 PROCEDURE — 94640 AIRWAY INHALATION TREATMENT: CPT

## 2019-07-23 PROCEDURE — 99233 PR SUBSEQUENT HOSPITAL CARE,LEVL III: ICD-10-PCS | Mod: ,,, | Performed by: INTERNAL MEDICINE

## 2019-07-23 PROCEDURE — 99233 SBSQ HOSP IP/OBS HIGH 50: CPT | Mod: ,,, | Performed by: INTERNAL MEDICINE

## 2019-07-23 PROCEDURE — G0009 ADMIN PNEUMOCOCCAL VACCINE: HCPCS | Performed by: INTERNAL MEDICINE

## 2019-07-23 PROCEDURE — 92610 EVALUATE SWALLOWING FUNCTION: CPT

## 2019-07-23 PROCEDURE — 63600175 PHARM REV CODE 636 W HCPCS: Performed by: FAMILY MEDICINE

## 2019-07-23 PROCEDURE — 90471 IMMUNIZATION ADMIN: CPT | Performed by: INTERNAL MEDICINE

## 2019-07-23 PROCEDURE — 85025 COMPLETE CBC W/AUTO DIFF WBC: CPT

## 2019-07-23 PROCEDURE — 36415 COLL VENOUS BLD VENIPUNCTURE: CPT

## 2019-07-23 PROCEDURE — 25000003 PHARM REV CODE 250: Performed by: NURSE PRACTITIONER

## 2019-07-23 PROCEDURE — 90732 PPSV23 VACC 2 YRS+ SUBQ/IM: CPT | Performed by: INTERNAL MEDICINE

## 2019-07-23 PROCEDURE — 94761 N-INVAS EAR/PLS OXIMETRY MLT: CPT

## 2019-07-23 PROCEDURE — 11000001 HC ACUTE MED/SURG PRIVATE ROOM

## 2019-07-23 PROCEDURE — 97530 THERAPEUTIC ACTIVITIES: CPT

## 2019-07-23 PROCEDURE — 25000242 PHARM REV CODE 250 ALT 637 W/ HCPCS: Performed by: NURSE PRACTITIONER

## 2019-07-23 PROCEDURE — S0077 INJECTION, CLINDAMYCIN PHOSP: HCPCS | Performed by: NURSE PRACTITIONER

## 2019-07-23 PROCEDURE — 63600175 PHARM REV CODE 636 W HCPCS: Performed by: INTERNAL MEDICINE

## 2019-07-23 PROCEDURE — 80053 COMPREHEN METABOLIC PANEL: CPT

## 2019-07-23 RX ORDER — CLINDAMYCIN PHOSPHATE 600 MG/50ML
600 INJECTION, SOLUTION INTRAVENOUS
Status: DISCONTINUED | OUTPATIENT
Start: 2019-07-23 | End: 2019-07-24 | Stop reason: HOSPADM

## 2019-07-23 RX ORDER — IPRATROPIUM BROMIDE AND ALBUTEROL SULFATE 2.5; .5 MG/3ML; MG/3ML
3 SOLUTION RESPIRATORY (INHALATION)
Status: DISCONTINUED | OUTPATIENT
Start: 2019-07-23 | End: 2019-07-24 | Stop reason: HOSPADM

## 2019-07-23 RX ADMIN — PNEUMOCOCCAL VACCINE POLYVALENT 0.5 ML
25; 25; 25; 25; 25; 25; 25; 25; 25; 25; 25; 25; 25; 25; 25; 25; 25; 25; 25; 25; 25; 25; 25 INJECTION, SOLUTION INTRAMUSCULAR; SUBCUTANEOUS at 09:07

## 2019-07-23 RX ADMIN — PANTOPRAZOLE SODIUM 40 MG: 40 TABLET, DELAYED RELEASE ORAL at 09:07

## 2019-07-23 RX ADMIN — ACETAMINOPHEN 650 MG: 325 TABLET ORAL at 12:07

## 2019-07-23 RX ADMIN — CLINDAMYCIN IN 5 PERCENT DEXTROSE 600 MG: 12 INJECTION, SOLUTION INTRAVENOUS at 09:07

## 2019-07-23 RX ADMIN — IPRATROPIUM BROMIDE AND ALBUTEROL SULFATE 3 ML: .5; 3 SOLUTION RESPIRATORY (INHALATION) at 01:07

## 2019-07-23 RX ADMIN — CLINDAMYCIN IN 5 PERCENT DEXTROSE 600 MG: 12 INJECTION, SOLUTION INTRAVENOUS at 05:07

## 2019-07-23 RX ADMIN — SOTALOL HYDROCHLORIDE 40 MG: 80 TABLET ORAL at 09:07

## 2019-07-23 RX ADMIN — APIXABAN 2.5 MG: 2.5 TABLET, FILM COATED ORAL at 08:07

## 2019-07-23 RX ADMIN — APIXABAN 2.5 MG: 2.5 TABLET, FILM COATED ORAL at 09:07

## 2019-07-23 RX ADMIN — IPRATROPIUM BROMIDE AND ALBUTEROL SULFATE 3 ML: .5; 3 SOLUTION RESPIRATORY (INHALATION) at 07:07

## 2019-07-23 RX ADMIN — LEVOFLOXACIN 750 MG: 750 INJECTION, SOLUTION INTRAVENOUS at 08:07

## 2019-07-23 RX ADMIN — ATORVASTATIN CALCIUM 40 MG: 40 TABLET, FILM COATED ORAL at 09:07

## 2019-07-23 RX ADMIN — SOTALOL HYDROCHLORIDE 40 MG: 80 TABLET ORAL at 08:07

## 2019-07-23 NOTE — PLAN OF CARE
07/23/19 1433   Post-Acute Status   Post-Acute Authorization Placement   Post-Acute Placement Status Set-up Complete   Discharge Delays (!) Change in Medical Condition     Received message today from [facility] stating that the patient has been cleared by his insurance to go to [facility] for care post hospital stay. Due to patient's aspiration last night, the patient has developed a pneumonia so is now unable to be discharged. [facility] updated via [employer] [email or web address]).     Daya Galvan LMSW

## 2019-07-23 NOTE — PLAN OF CARE
07/23/19 1105   Discharge Reassessment   Assessment Type Discharge Planning Reassessment   Provided patient/caregiver education on the expected discharge date and the discharge plan Yes   Discharge Plan A Skilled Nursing Facility   Discharge Plan B Home with family;Home Health   DME Needed Upon Discharge  none   Anticipated Discharge Disposition SNF   Can the patient answer the patient profile reliably? No, cognitively impaired  (patient is confused at times)   How does the patient rate their overall health at the present time? Poor   Describe the patient's ability to walk at the present time. Major restrictions/daily assistance from another person   How often would a person be available to care for the patient? Whenever needed   Post-Acute Status   Post-Acute Authorization Placement   Post-Acute Placement Status Authorization Obtained   Discharge Delays (!) Change in Medical Condition         Patient accepted to [facility]. Patient has new clinical findings today that would need to be treated (Aspiration PNA). Patient is pending SLP eval today as well. Patient will remain for continued treatment today. No needs or concerns voiced at this time. CM will continue to follow and assist as needed.

## 2019-07-23 NOTE — ASSESSMENT & PLAN NOTE
Urine culture reviewed sensitive to levaquin  Repeat urinalysis yesterday better  Levaquin 750 mg IV piggyback Q 24 hr  Continue IV fluids 75 cc/hr- d/c yesterday as bp elevated.

## 2019-07-23 NOTE — PROGRESS NOTES
[facility] Medicine  Progress Note    Patient Name: Hernesto Sofia  MRN: [id number]  Patient Class: IP- Inpatient   Admission Date: 7/20/2019  Length of Stay: 3 days  Attending Physician: Denton Polanco MD  Primary Care Provider: Hasmukh Pederson MD        Subjective:     Principal Problem:Complicated UTI (urinary tract infection)      HPI:  Hernesto Sofia presents to the emergency room with fever and confusion  Patient suffers from dementia but is more confused than typical per family  Patient was hard to walk today, was weak and confused at home this p.m.  Patient is alert but obviously demented, no obvious focal deficit noted today  Daughter states that pt has suffered from urinary tract infections in the past  Workup in the emergency room revealed patient had a significant urinary tract infection.  He has had these in the past.  He is going to be admitted for IV antibiotics and therapy.    Overview/Hospital Course:  7/22 he was placed inpatient on levaquin for UTI. Urine culture growing gram - but sensitivities pending. Low grade temps 99 over last 24hr. WBC better this am 34115 on admission to now 55076. Family at bedside reports AMS back to baseline    7/23 He is currently on levaquin. T max 100.7 yesterday 7pm. WBC down to normal 87182>45425.     Last night poss aspiration. Tachypnia, POX low 90's. Nebs ordered. CXR shows left upper infiltrate. He is now NPO with speech consult this am.   Family at bedside denies that he chokes with eating. Talked to his daughter explained .    Interval note: AMS resolved. WBC better with levaquin as well as u/a. Following cultures   Review of Systems   Unable to perform ROS: Dementia     Objective:     Vital Signs (Most Recent):  Temp: 98.8 °F (37.1 °C) (07/23/19 0709)  Pulse: 72 (07/23/19 0709)  Resp: 19 (07/23/19 0709)  BP: (!) 143/79 (07/23/19 0709)  SpO2: 97 % (07/23/19 0724) Vital Signs (24h Range):  Temp:  [97.5 °F (36.4 °C)-100.7  °F (38.2 °C)] 98.8 °F (37.1 °C)  Pulse:  [] 72  Resp:  [15-20] 19  SpO2:  [95 %-97 %] 97 %  BP: (105-159)/(54-86) 143/79     Weight: 66.9 kg (147 lb 7.8 oz)  Body mass index is 23.1 kg/m².    Physical Exam   Constitutional: He appears well-developed and well-nourished. No distress.   HENT:   Head: Normocephalic and atraumatic.   Right Ear: External ear normal.   Left Ear: External ear normal.   Eyes: Pupils are equal, round, and reactive to light. Conjunctivae and EOM are normal.   Neck: Neck supple. No tracheal deviation present.   Cardiovascular: Normal rate, regular rhythm and normal heart sounds.   No murmur heard.  Pulmonary/Chest: Effort normal and breath sounds normal. No respiratory distress.   Abdominal: Soft. Bowel sounds are normal. He exhibits no distension.   Musculoskeletal: He exhibits no edema or tenderness.   Neurological: He is alert. No cranial nerve deficit.   Lying in bed eating breakfast.  Pleasantly demented.     Nursing note and vitals reviewed.        CRANIAL NERVES     CN III, IV, VI   Pupils are equal, round, and reactive to light.  Extraocular motions are normal.        Significant Labs:   CBC:   Recent Labs   Lab 07/22/19  0824 07/23/19  0543   WBC 12.21 10.81   HGB 14.1 12.9*   HCT 41.6 37.6*    145*     CMP:   Recent Labs   Lab 07/22/19  0824 07/23/19  0543    137   K 4.1 3.7    104   CO2 26 23   * 94   BUN 12 15   CREATININE 0.8 0.8   CALCIUM 9.6 9.4   PROT 7.0 6.4   ALBUMIN 3.3* 2.8*   BILITOT 1.5* 1.4*   ALKPHOS 119 108   AST 20 19   ALT 18 15   ANIONGAP 8 10   EGFRNONAA >60 >60   phos 3.1  Mag 1.8  Urine Studies:   Recent Labs   Lab 07/21/19  1505   COLORU Yellow   APPEARANCEUA Clear   PHUR 6.0   SPECGRAV 1.025   PROTEINUA Negative   GLUCUA Negative   KETONESU Negative   BILIRUBINUA Negative   OCCULTUA 1+*   NITRITE Negative   UROBILINOGEN 1.0   LEUKOCYTESUR Negative   RBCUA 7*   WBCUA 10*     Lactic 1.2>1.0  Lipase 20.   (looks like his  baseline is ~400)  Lab Results   Component Value Date    INR 1.1 07/20/2019    INR 1.1 07/01/2019    INR 1.1 06/16/2019         pro calcitonin 0.05  Recent Labs   Lab 07/20/19 1952   TROPONINI 0.015     Susceptibility      Escherichia coli     CULTURE, URINE (Preliminary)     Amox/K Clav'ate <=8/4 mcg/mL Sensitive     Amp/Sulbactam <=8/4 mcg/mL Sensitive     Ampicillin <=8 mcg/mL Sensitive     Cefepime <=8 mcg/mL Sensitive     Ciprofloxacin <=1 mcg/mL Sensitive     Gentamicin <=4 mcg/mL Sensitive     Levofloxacin <=2 mcg/mL Sensitive     Nitrofurantoin <=32 mcg/mL Sensitive     Piperacillin/Tazo <=16 mcg/mL Sensitive     Tetracycline <=4 mcg/mL Sensitive     Tobramycin <=4 mcg/mL Sensitive     Trimeth/Sulfa <=2/38 mcg/mL Sensitive        Blood cultures x 2 NGTD    Flu negative      Significant Imaging:    CT head   1. There is no acute abnormality.  There is no hemorrhage, mass effect or obvious acute edema or ischemia.  2. There is moderate volume loss, ventriculomegaly and nonspecific white matter change.  3. Mild chronic sinus disease.    CXR 7/22   Suspect lingular and left upper lobe infiltrate      CXR  7/20  1. Minimal left basilar atelectasis may be present.  Otherwise, there is no acute abnormality or change compared to the prior study.    EKG   Atrial fibrillation  Left axis deviation  Incomplete right bundle branch block  Nonspecific ST and T wave abnormality  Abnormal ECG  When compared with ECG of 01-JUL-2019 09:10,  No significant change was found  Confirmed by ASHLEY GONZALEZ, FABIAN ([id number]) on 7/21/2019 1:14:11 PM      Assessment/Plan:      * Complicated UTI (urinary tract infection)  Urine culture reviewed sensitive to levaquin  Repeat urinalysis yesterday better  Levaquin 750 mg IV piggyback Q 24 hr  Continue IV fluids 75 cc/hr- d/c yesterday as bp elevated.    Aspiration pneumonia  NPO  Nebs and add clinda (allergy to PCN)  Speech consult today.      Alzheimer's disease  Patient's DNR status will be  continued per family request.      Acute confusional state  Probably from the urinary tract infection.    Back to baseline this am  Cont antibiotics    HTN (hypertension)  Cont sotalol  D/c ivf.      HLD (hyperlipidemia)  Cont statin.      Atrial fibrillation with RVR  Continue Eliquis and sotalol  HR 87-92.        VTE Risk Mitigation (From admission, onward)        Ordered     apixaban tablet 2.5 mg  2 times daily      07/21/19 0821     IP VTE HIGH RISK PATIENT  Once      07/21/19 0028     Place sequential compression device  Until discontinued      07/21/19 0028                Billie Singh MD  Department of Hospital Medicine   [facility]

## 2019-07-23 NOTE — PT/OT/SLP EVAL
Speech Language Pathology Evaluation  Bedside Swallow    Patient Name:  Hernesto Sofia   MRN:  [id number]  Admitting Diagnosis: Complicated UTI (urinary tract infection)    Recommendations:                 General Recommendations: SLP to follow up for ongoing dynamic assessment of swallow physiology and to follow up for diet tolerance  Diet recommendations: Solid Diet Level: Mechanical soft with finely chopped meat, Liquid Diet Level: Thin   Aspiration Precautions:   · Alternating bites/sips  · Check for pocketing/oral residue  · Eliminate distractions  · HOB to 90 degrees   · Meds whole buried in puree   General Precautions: Standard, fall  Communication strategies:  provide increased time to answer    History:     Past Medical History:   Diagnosis Date    A-fib     Anticoagulant long-term use     Dementia     Hyperlipidemia     Hypertension     Pacemaker        Past Surgical History:   Procedure Laterality Date    CARDIAC PACEMAKER PLACEMENT         Social History: Patient lives at his home with sitters present during the day and his children rotating staying with him during the night.    Prior Intubation HX:  n/a    Modified Barium Swallow: n/a    Chest X-Rays: 7/22/19- Increased opacification the left upper lobe laterally. Suspect a lingular infiltrate. The cardiac silhouette and mediastinum are within normal limits. The remaining osseous structures and soft tissues are within normal limits.    Prior diet: NPO     Occupation/hobbies/homemaking: none stated    Subjective     Pt found resting in bed with daughter present at bedside. Pt was pleasant throughout evaluation and an active participant during PO trials. Prior to PO, pt noted to have wet coughing which family endorsed as being new as of yesterday; nursing additionally reported that pt had been NPO since PM medication administration yesterday, 7/22/19.     Patient goals: none stated     Pain/Comfort:  · Pain Rating 1: 0/10    Objective:     Oral  Musculature Evaluation  · Dentition: upper dentures  · Mucosal Quality: good  · Mandibular Strength and Mobility: WFL  · Oral Labial Strength and Mobility: WFL  · Lingual Strength and Mobility: WFL  · Buccal Strength and Mobility: WFL  · Volitional Cough: (nonproductive)    Bedside Swallow Eval:   Consistencies Assessed:  · Thin liquids - pt-fed with clinician assistance via single and consecutive open cup and straw sips  · Puree - pt-fed with clinician assistance via tsp of puree  · Soft solids - clinician-fed via square inch of richelle cracker coated in pudding      Oral Phase:   · Mild-moderate oral residue observed following trials of soft solids. Pt noted to have awareness of residue as he would manipulate masticated material between buccal cavities but required MOD verbal cueing to complete additional swallows. MILD residue remained following cued swallows and was cleared to trace amounts after cued liquid wash via straw sip    Pharyngeal Phase:   · Single instance of coughing following large straw sips of thin liquids and slight change in vocal quality which was eliminated with independent throat clear    Compensatory Strategies  · Liquid wash    Treatment: Diet consistencies were reviewed with both pt and family who verbalized understanding. SLP spoke with NP regarding changes to diet and request for OT consult as pt demonstrated difficulty with self feeding which may impact nutritional intake and independence with ADLs.     Assessment:     Hernesto Sofia is a 89 y.o. male who presents with oral holding of soft solids. Recommend a mechancial soft diet with finely chopped meats and thin liquids and safe swallow strategies including alternating bites/sips, check for pocketing/oral residue, eliminate distractions, HOB to 90 degrees, and meds whole buried in puree. SLP to continue to follow up for ongoing dynamic assessment of swallow function and to monitor for diet tolerance.    Goals:   Multidisciplinary  Problems     SLP Goals        Problem: SLP Goal    Goal Priority Disciplines Outcome   SLP Goal     SLP    Description:    Long Term Goals:  1. Pt will tolerate least restrictive PO diet with no overt s/s of aspiration in order to maintain adequate hydration and nutrition                     Plan:     · Patient to be seen:  5 x/week   · Plan of Care expires:  08/06/19  · Plan of Care reviewed with:  patient   · SLP Follow-Up:  Yes       Discharge recommendations:  nursing facility, skilled   Barriers to Discharge:  Level of Skilled Assistance Needed - pt cotninues to requrie skilled personnel for management of medical diagnoses    Time Tracking:     SLP Treatment Date:   07/23/19  Speech Start Time:  0858  Speech Stop Time:  0934     Speech Total Time (min):  36 min    Billable Minutes: Eval Swallow and Oral Function - 25 minutes       Bella Shankar CCC-SLP  07/23/2019

## 2019-07-23 NOTE — NURSING
Patient becoming more tachypneic and having audible wheezes. Sats mid 90's. Dr. Pandey called and ordered PRN nebs. Made patient NPO, speech consult, and chest xray obtained. Will continue to monitor.

## 2019-07-23 NOTE — PT/OT/SLP PROGRESS
Physical Therapy Treatment    Patient Name:  Hernesto Sofia   MRN:  [id number]    Recommendations:     Discharge Recommendations:  nursing facility, skilled   Discharge Equipment Recommendations: none   Barriers to discharge: Inaccessible home and Decreased caregiver support    Assessment:     Hernesto Sofia is a 89 y.o. male admitted with a medical diagnosis of Complicated UTI (urinary tract infection).  He presents with the following impairments/functional limitations:  weakness, impaired endurance Patient seen on bed with Sitter at bedside. Patient noted with lesser resistive movt and increased body alignment upon Static sitting at edge of the bed and on the bedside chair. Patient also increased sitting tolerance on the chair for meal time.    Rehab Prognosis: Fair; patient would benefit from acute skilled PT services to address these deficits and reach maximum level of function.    Recent Surgery: * No surgery found *      Plan:     During this hospitalization, patient to be seen daily to address the identified rehab impairments via gait training, therapeutic activities, therapeutic exercises and progress toward the following goals:    · Plan of Care Expires:  08/20/19    Subjective     Chief Complaint: Patient having Aspiration Pneumonia withomplain general body weakness.  Patient/Family Comments/goals: Patient daughter desires patient to go to a SNF(Nursing Home).  Pain/Comfort:  · Pain Rating 1: 0/10  · Pain Rating Post-Intervention 1: 0/10      Objective:     Communicated with patient and Sitter prior to session.  Patient found HOB elevated with peripheral IV, telemetry upon PT entry to room.     General Precautions: Standard, fall   Orthopedic Precautions:N/A   Braces: N/A     Functional Mobility:  · Bed Mobility:     · Rolling Left:  minimum assistance  · Rolling Right: minimum assistance  · Supine to Sit: minimum assistance  · Sit to Supine: minimum assistance  · Transfers:     · Bed to Chair: maximal  assistance with  hand-held assist  using  Squat Pivot  · Balance: Static Sitting Balance with Fair- grade.      AM-PAC 6 CLICK MOBILITY  Turning over in bed (including adjusting bedclothes, sheets and blankets)?: 3  Sitting down on and standing up from a chair with arms (e.g., wheelchair, bedside commode, etc.): 2  Moving from lying on back to sitting on the side of the bed?: 3  Moving to and from a bed to a chair (including a wheelchair)?: 2  Need to walk in hospital room?: 1  Climbing 3-5 steps with a railing?: 1  Basic Mobility Total Score: 12       Therapeutic Activities and Exercises:   Worked on body sequence on bed mobility and Squat Pivot transfers with constant coaxing with verbal and tactile cues. Also implemented Static and Dynamic Sitting balance with reaching items over midline position.    Patient left up in chair with all lines intact, call button in reach, nurse  notified and Sitter present..    GOALS:   Multidisciplinary Problems     Physical Therapy Goals        Problem: Physical Therapy Goal    Goal Priority Disciplines Outcome Goal Variances Interventions   Physical Therapy Goal     PT, PT/OT Ongoing (interventions implemented as appropriate)     Description:  Goals to be met by:      Patient will increase functional independence with mobility by performin. Supine to sit with Stand-by Assistance  2. Sit to supine with Stand-by Assistance  3. Sit to stand transfer with Stand-by Assistance with RW  4. Gait  x 50 feet with Contact Guard Assistance using Rolling Walker.                       Time Tracking:     PT Received On: 19  PT Start Time: 1210     PT Stop Time: 1240  PT Total Time (min): 30 min     Billable Minutes: Therapeutic Activity 30    Treatment Type: Treatment  PT/PTA: PT           Reece Melara, PT  2019

## 2019-07-23 NOTE — SUBJECTIVE & OBJECTIVE
Interval note: AMS resolved. WBC better with levaquin as well as u/a. Following cultures   Review of Systems   Unable to perform ROS: Dementia     Objective:     Vital Signs (Most Recent):  Temp: 98.8 °F (37.1 °C) (07/23/19 0709)  Pulse: 72 (07/23/19 0709)  Resp: 19 (07/23/19 0709)  BP: (!) 143/79 (07/23/19 0709)  SpO2: 97 % (07/23/19 0724) Vital Signs (24h Range):  Temp:  [97.5 °F (36.4 °C)-100.7 °F (38.2 °C)] 98.8 °F (37.1 °C)  Pulse:  [] 72  Resp:  [15-20] 19  SpO2:  [95 %-97 %] 97 %  BP: (105-159)/(54-86) 143/79     Weight: 66.9 kg (147 lb 7.8 oz)  Body mass index is 23.1 kg/m².    Physical Exam   Constitutional: He appears well-developed and well-nourished. No distress.   HENT:   Head: Normocephalic and atraumatic.   Right Ear: External ear normal.   Left Ear: External ear normal.   Eyes: Pupils are equal, round, and reactive to light. Conjunctivae and EOM are normal.   Neck: Neck supple. No tracheal deviation present.   Cardiovascular: Normal rate, regular rhythm and normal heart sounds.   No murmur heard.  Pulmonary/Chest: Effort normal and breath sounds normal. No respiratory distress.   Abdominal: Soft. Bowel sounds are normal. He exhibits no distension.   Musculoskeletal: He exhibits no edema or tenderness.   Neurological: He is alert. No cranial nerve deficit.   Lying in bed eating breakfast.  Pleasantly demented.     Nursing note and vitals reviewed.        CRANIAL NERVES     CN III, IV, VI   Pupils are equal, round, and reactive to light.  Extraocular motions are normal.        Significant Labs:   CBC:   Recent Labs   Lab 07/22/19 0824 07/23/19  0543   WBC 12.21 10.81   HGB 14.1 12.9*   HCT 41.6 37.6*    145*     CMP:   Recent Labs   Lab 07/22/19  0824 07/23/19  0543    137   K 4.1 3.7    104   CO2 26 23   * 94   BUN 12 15   CREATININE 0.8 0.8   CALCIUM 9.6 9.4   PROT 7.0 6.4   ALBUMIN 3.3* 2.8*   BILITOT 1.5* 1.4*   ALKPHOS 119 108   AST 20 19   ALT 18 15   ANIONGAP 8  10   EGFRNONAA >60 >60   phos 3.1  Mag 1.8  Urine Studies:   Recent Labs   Lab 07/21/19  1505   COLORU Yellow   APPEARANCEUA Clear   PHUR 6.0   SPECGRAV 1.025   PROTEINUA Negative   GLUCUA Negative   KETONESU Negative   BILIRUBINUA Negative   OCCULTUA 1+*   NITRITE Negative   UROBILINOGEN 1.0   LEUKOCYTESUR Negative   RBCUA 7*   WBCUA 10*     Lactic 1.2>1.0  Lipase 20.   (looks like his baseline is ~400)  Lab Results   Component Value Date    INR 1.1 07/20/2019    INR 1.1 07/01/2019    INR 1.1 06/16/2019         pro calcitonin 0.05  Recent Labs   Lab 07/20/19  1952   TROPONINI 0.015     Susceptibility      Escherichia coli     CULTURE, URINE (Preliminary)     Amox/K Clav'ate <=8/4 mcg/mL Sensitive     Amp/Sulbactam <=8/4 mcg/mL Sensitive     Ampicillin <=8 mcg/mL Sensitive     Cefepime <=8 mcg/mL Sensitive     Ciprofloxacin <=1 mcg/mL Sensitive     Gentamicin <=4 mcg/mL Sensitive     Levofloxacin <=2 mcg/mL Sensitive     Nitrofurantoin <=32 mcg/mL Sensitive     Piperacillin/Tazo <=16 mcg/mL Sensitive     Tetracycline <=4 mcg/mL Sensitive     Tobramycin <=4 mcg/mL Sensitive     Trimeth/Sulfa <=2/38 mcg/mL Sensitive        Blood cultures x 2 NGTD    Flu negative      Significant Imaging:    CT head   1. There is no acute abnormality.  There is no hemorrhage, mass effect or obvious acute edema or ischemia.  2. There is moderate volume loss, ventriculomegaly and nonspecific white matter change.  3. Mild chronic sinus disease.    CXR 7/22   Suspect lingular and left upper lobe infiltrate      CXR  7/20  1. Minimal left basilar atelectasis may be present.  Otherwise, there is no acute abnormality or change compared to the prior study.    EKG   Atrial fibrillation  Left axis deviation  Incomplete right bundle branch block  Nonspecific ST and T wave abnormality  Abnormal ECG  When compared with ECG of 01-JUL-2019 09:10,  No significant change was found  Confirmed by FABIAN RAMIREZ MD ([id number]) on 7/21/2019 1:14:11  PM

## 2019-07-23 NOTE — PROGRESS NOTES
Staff Handoff    Bedside report received from MADDIE Wagner. VS stable. Afebrile. No distress noted. Assessment complete per flowsheet. Bed alarm engaged. Sitter at bedside. Call bell in reach. Instructed to call for any needs. Will continue to monitor for any change in status.  Resident Handoff

## 2019-07-23 NOTE — PLAN OF CARE
Problem: Adult Inpatient Plan of Care  Goal: Plan of Care Review  Outcome: Ongoing (interventions implemented as appropriate)  Patient aware of plan of care. VS stable. Afebrile. IV antibiotics continued. Mechanical soft diet with thin liquids. Breathing treatments. Frequent repositioning. PPD given to right forearm needs to be read Wednesday afternoon. Sitter at bedside. Free from falls/injuries. No questions or concerns at this time. Agrees with plan of care.

## 2019-07-24 VITALS
RESPIRATION RATE: 18 BRPM | BODY MASS INDEX: 23.15 KG/M2 | TEMPERATURE: 97 F | SYSTOLIC BLOOD PRESSURE: 149 MMHG | DIASTOLIC BLOOD PRESSURE: 71 MMHG | WEIGHT: 147.5 LBS | OXYGEN SATURATION: 98 % | HEART RATE: 79 BPM | HEIGHT: 67 IN

## 2019-07-24 LAB
ALBUMIN SERPL BCP-MCNC: 2.7 G/DL (ref 3.5–5.2)
ALP SERPL-CCNC: 109 U/L (ref 55–135)
ALT SERPL W/O P-5'-P-CCNC: 16 U/L (ref 10–44)
ANION GAP SERPL CALC-SCNC: 11 MMOL/L (ref 8–16)
AST SERPL-CCNC: 23 U/L (ref 10–40)
BASOPHILS # BLD AUTO: 0.05 K/UL (ref 0–0.2)
BASOPHILS NFR BLD: 0.5 % (ref 0–1.9)
BILIRUB SERPL-MCNC: 1.1 MG/DL (ref 0.1–1)
BUN SERPL-MCNC: 15 MG/DL (ref 10–30)
CALCIUM SERPL-MCNC: 9.5 MG/DL (ref 8.7–10.5)
CHLORIDE SERPL-SCNC: 102 MMOL/L (ref 95–110)
CO2 SERPL-SCNC: 24 MMOL/L (ref 23–29)
CREAT SERPL-MCNC: 0.8 MG/DL (ref 0.5–1.4)
DIFFERENTIAL METHOD: ABNORMAL
EOSINOPHIL # BLD AUTO: 0.2 K/UL (ref 0–0.5)
EOSINOPHIL NFR BLD: 1.8 % (ref 0–8)
ERYTHROCYTE [DISTWIDTH] IN BLOOD BY AUTOMATED COUNT: 13.2 % (ref 11.5–14.5)
EST. GFR  (AFRICAN AMERICAN): >60 ML/MIN/1.73 M^2
EST. GFR  (NON AFRICAN AMERICAN): >60 ML/MIN/1.73 M^2
GLUCOSE SERPL-MCNC: 93 MG/DL (ref 70–110)
HCT VFR BLD AUTO: 38.8 % (ref 40–54)
HGB BLD-MCNC: 13.3 G/DL (ref 14–18)
IMM GRANULOCYTES # BLD AUTO: 0.04 K/UL (ref 0–0.04)
IMM GRANULOCYTES NFR BLD AUTO: 0.4 % (ref 0–0.5)
LYMPHOCYTES # BLD AUTO: 1.5 K/UL (ref 1–4.8)
LYMPHOCYTES NFR BLD: 15.3 % (ref 18–48)
MCH RBC QN AUTO: 31.7 PG (ref 27–31)
MCHC RBC AUTO-ENTMCNC: 34.3 G/DL (ref 32–36)
MCV RBC AUTO: 92 FL (ref 82–98)
MONOCYTES # BLD AUTO: 1 K/UL (ref 0.3–1)
MONOCYTES NFR BLD: 9.9 % (ref 4–15)
NEUTROPHILS # BLD AUTO: 7.2 K/UL (ref 1.8–7.7)
NEUTROPHILS NFR BLD: 72.1 % (ref 38–73)
NRBC BLD-RTO: 0 /100 WBC
PLATELET # BLD AUTO: 169 K/UL (ref 150–350)
PMV BLD AUTO: 10.2 FL (ref 9.2–12.9)
POTASSIUM SERPL-SCNC: 3.9 MMOL/L (ref 3.5–5.1)
PROT SERPL-MCNC: 6.5 G/DL (ref 6–8.4)
RBC # BLD AUTO: 4.2 M/UL (ref 4.6–6.2)
SODIUM SERPL-SCNC: 137 MMOL/L (ref 136–145)
WBC # BLD AUTO: 9.99 K/UL (ref 3.9–12.7)

## 2019-07-24 PROCEDURE — S0077 INJECTION, CLINDAMYCIN PHOSP: HCPCS | Performed by: NURSE PRACTITIONER

## 2019-07-24 PROCEDURE — 85025 COMPLETE CBC W/AUTO DIFF WBC: CPT

## 2019-07-24 PROCEDURE — 99239 HOSP IP/OBS DSCHRG MGMT >30: CPT | Mod: ,,, | Performed by: FAMILY MEDICINE

## 2019-07-24 PROCEDURE — 99239 PR HOSPITAL DISCHARGE DAY,>30 MIN: ICD-10-PCS | Mod: ,,, | Performed by: FAMILY MEDICINE

## 2019-07-24 PROCEDURE — 80053 COMPREHEN METABOLIC PANEL: CPT

## 2019-07-24 PROCEDURE — 92526 ORAL FUNCTION THERAPY: CPT

## 2019-07-24 PROCEDURE — 94640 AIRWAY INHALATION TREATMENT: CPT

## 2019-07-24 PROCEDURE — 94760 N-INVAS EAR/PLS OXIMETRY 1: CPT

## 2019-07-24 PROCEDURE — 25000003 PHARM REV CODE 250: Performed by: NURSE PRACTITIONER

## 2019-07-24 PROCEDURE — 25000242 PHARM REV CODE 250 ALT 637 W/ HCPCS: Performed by: NURSE PRACTITIONER

## 2019-07-24 PROCEDURE — 36415 COLL VENOUS BLD VENIPUNCTURE: CPT

## 2019-07-24 PROCEDURE — 25000003 PHARM REV CODE 250: Performed by: FAMILY MEDICINE

## 2019-07-24 RX ORDER — CLINDAMYCIN HYDROCHLORIDE 300 MG/1
300 CAPSULE ORAL 3 TIMES DAILY
Qty: 27 CAPSULE | Refills: 0 | OUTPATIENT
Start: 2019-07-24 | End: 2019-08-02

## 2019-07-24 RX ORDER — LEVOFLOXACIN 500 MG/1
500 TABLET, FILM COATED ORAL DAILY
Qty: 3 TABLET | Refills: 0 | OUTPATIENT
Start: 2019-07-24 | End: 2019-08-02

## 2019-07-24 RX ORDER — L. ACIDOPHILUS/L.BULGARICUS 100MM CELL
1 GRANULES IN PACKET (EA) ORAL 2 TIMES DAILY
COMMUNITY
Start: 2019-07-24

## 2019-07-24 RX ORDER — ACETAMINOPHEN 325 MG/1
650 TABLET ORAL EVERY 8 HOURS PRN
Refills: 0 | COMMUNITY
Start: 2019-07-24

## 2019-07-24 RX ADMIN — PANTOPRAZOLE SODIUM 40 MG: 40 TABLET, DELAYED RELEASE ORAL at 09:07

## 2019-07-24 RX ADMIN — APIXABAN 2.5 MG: 2.5 TABLET, FILM COATED ORAL at 09:07

## 2019-07-24 RX ADMIN — SOTALOL HYDROCHLORIDE 40 MG: 80 TABLET ORAL at 09:07

## 2019-07-24 RX ADMIN — ATORVASTATIN CALCIUM 40 MG: 40 TABLET, FILM COATED ORAL at 09:07

## 2019-07-24 RX ADMIN — IPRATROPIUM BROMIDE AND ALBUTEROL SULFATE 3 ML: .5; 3 SOLUTION RESPIRATORY (INHALATION) at 07:07

## 2019-07-24 RX ADMIN — CLINDAMYCIN IN 5 PERCENT DEXTROSE 600 MG: 12 INJECTION, SOLUTION INTRAVENOUS at 01:07

## 2019-07-24 NOTE — PHYSICIAN QUERY
PT Name: Hernesto Sofia  MR #: [id number]    Physician Query Form - Atrial Fibrillation Specificity     CDS/: Lakeisha Vasques RN                 Contact information:[email or web address]     This form is a permanent document in the medical record.     Query Date: July 24, 2019    By submitting this query, we are merely seeking further clarification of documentation. Please utilize your independent clinical judgment when addressing the question(s) below.    The medical record contains the following:   Indicators     Supporting Clinical Findings Location in Medical Record   x Atrial Fibrillation Atrial fibrillation with RVR [facility] 7/23      EKG results     x Medication Continue Eliquis and sotalol [facility] 7/23     Treatment      Other         Provider, please further specify the Atrial Fibrillation diagnosis.    [  ] Chronic   [ x ] Paroxysmal   [  ] Permanent   [  ] Persistent   [  ] Other (please specify):   [  ] Clinically Undetermined       Please document in your progress notes daily for the duration of treatment until resolved, and include in your discharge summary.

## 2019-07-24 NOTE — PHYSICIAN QUERY
PT Name: Hernesto Sofia  MR #: [id number]     CDS/: Lakeisha Vasques RN                 Contact information:[email or web address]  This form is a permanent document in the medical record.     Query Date: July 24, 2019    Physician Query - Neurological Condition Clarification    By submitting this query, we are merely seeking further clarification of documentation to reflect the severity of illness of your patient. Please utilize your independent clinical judgment when addressing the question(s) below.    The Medical record reflects the following:     Indicators   Supporting Clinical Findings Location in Medical Record   x AMS, Confusion,  LOC, etc.  Hernesto Sofia presents to the emergency room with fever and confusion Patient suffers from dementia but is more confused than typical per family Patient was hard to walk today, was weak and confused at home this p.m. Patient is alert but obviously demented, no obvious focal deficit noted today Daughter states that pt has suffered from urinary tract infections in the past Workup in the emergency room revealed patient had a significant urinary tract infection.    Acute confusional state  Probably from the urinary tract infection.    Back to baseline this am  Cont antibiotics [facility] 7/23   x Acute / Chronic Illness Complicated UTI (urinary tract infection)  Alzheimer's disease  HTN (hypertension)  HLD (hyperlipidemia)  Atrial fibrillation with RVR [facility] 7/23    Radiology Findings      Electrolyte Imbalance      Medication     x Treatment         Urine culture reviewed sensitive to levaquin  Repeat urinalysis yesterday better  Levaquin 750 mg IV piggyback Q 24 hr  Continue IV fluids 75 cc/hr- d/c yesterday as bp elevated. [facility] 7/23    Other       Encephalopathy- is a general term for any diffuse disease of the brain that alters brain function or structure. Treatment of the cognitive dysfunction varies but is ultimately dependent on the treatment of  the underlying condition.    Major Symptoms of Encephalopathy - Decreased level of consciousness, fluctuating alertness/concentration, confusion, agitation, lethargy, somnolence, drowsiness, obtundation, stupor, or coma.         References: National Institutes of Healths (NIH) National [place] of Neurological Disorders and Strokes;  HCPro 2016; Advisory Board     Clinical Guidelines:   These guidelines will set system standards to assist providers in managing, documentation, and coding of encephalopathy. The intent of this document is to serve as a system guideline, not replace the providers clinical judgment:  Provider, please specify the diagnosis or diagnoses associated with above clinical findings.  [  x ] Metabolic Encephalopathy - Due to electrolye imbalance, metabolic derangements, or infections processes, includes Septic Encephalopathy   [   ] Other Encephalopathy - Includes uremic encephalopathy   [   ] Unspecified Encephalopathy      [   ] Other Neurological Condition-  Includes Post-ictal altered mental status. (please specify condition): _________   [   ]  Clinically Undetermined     Please document in your progress notes daily for the duration of treatment until resolved, and include in your discharge summary.

## 2019-07-24 NOTE — NURSING
Bedside report complete. Numerous family members at bedside. Pt is awake, disoriented. Denies C/O at this time. SR up X3, call bell in reach. Instructed to call for needs or assistance.

## 2019-07-24 NOTE — DISCHARGE SUMMARY
[facility] Hospital Medicine  Discharge Summary      Patient Name: Hernesto Sofia  MRN: [id number]  Admission Date: 7/20/2019  Hospital Length of Stay: 4 days  Discharge Date and Time:  07/24/2019 10:39 AM  Attending Physician: Denton Polanco MD   Discharging Provider: Charito Cameron NP  Primary Care Provider: Hasmukh Pederson MD      HPI:   Hernesto Sofia presents to the emergency room with fever and confusion  Patient suffers from dementia but is more confused than typical per family  Patient was hard to walk today, was weak and confused at home this p.m.  Patient is alert but obviously demented, no obvious focal deficit noted today  Daughter states that pt has suffered from urinary tract infections in the past  Workup in the emergency room revealed patient had a significant urinary tract infection.  He has had these in the past.  He is going to be admitted for IV antibiotics and therapy.    * No surgery found *      Hospital Course:   7/22 he was placed inpatient on levaquin for UTI. Urine culture growing gram - but sensitivities pending. Low grade temps 99 over last 24hr. WBC better this am 64014 on admission to now 84125. Family at bedside reports AMS back to baseline    7/23 He is currently on levaquin. T max 100.7 yesterday 7pm. WBC down to normal 47327>30550.     Last night poss aspiration. Tachypnia, POX low 90's. Nebs ordered. CXR shows left upper infiltrate. He is now NPO with speech consult this am.   Family at bedside denies that he chokes with eating. Talked to his daughter explained .    7/24 he remains on levaquin for ecoli sensitive UTI. Afebrile overnight and no elevated WBC.     Also now on modified diet after speech saw him for poss aspiration. Clinda added as CXR showed infiltrates. Also nebs started.     Plan is for d[facility] to [facility]. Will need cont therapy as he is more dependent with max assist for transfers     Consults:   Consults (From admission, onward)         Status Ordering Provider     Inpatient consult to Social Work  Once     Provider:  (Not yet assigned)    Acknowledged ADRIANO SEGURA          * Complicated UTI (urinary tract infection)  Urine culture reviewed sensitive to levaquin  Repeat urinalysis yesterday better  Levaquin 750 mg IV piggyback Q 24 hr cont x 7 days will transition to po  Continue IV fluids 75 cc/hr- d/c yesterday as bp elevated.    Aspiration pneumonia  NPO  Nebs and add clinda (allergy to PCN)  Speech consult done yesterday and modifications made to diet  Will cont clinda x 10 days total po at nursing home with nebs and probiotic       Alzheimer's disease  Patient's DNR status will be continued per family request.      Acute confusional state  Probably from the urinary tract infection.    Back to baseline this am  Cont antibiotics    HTN (hypertension)  Cont sotalol  D/c ivf.      HLD (hyperlipidemia)  Cont statin.      Atrial fibrillation with RVR  Continue Eliquis and sotalol  HR 87-92.        Final Active Diagnoses:    Diagnosis Date Noted POA    PRINCIPAL PROBLEM:  Complicated UTI (urinary tract infection) [N39.0] 07/20/2019 Yes    Aspiration pneumonia [J69.0] 07/23/2019 Yes    Alzheimer's disease [G30.9, F02.80] 07/21/2019 Yes    Atrial fibrillation with RVR [I48.91] 01/30/2017 Yes    Acute confusional state [F05] 01/30/2017 Yes    HTN (hypertension) [I10] 01/30/2017 Yes    HLD (hyperlipidemia) [E78.5] 01/30/2017 Yes      Problems Resolved During this Admission:       Discharged Condition: good    Disposition: Long Term Care    Follow Up:  Follow-up Information     Hasmukh Pederson MD In 1 week.    Specialty:  Family Medicine  Contact information:  [address] [place] [id number] [phone]                 Patient Instructions:   No discharge procedures on file.    Significant Diagnostic Studies:     Significant Labs:   CBC:   Recent Labs   Lab 07/22/19  0824 07/23/19  0543 07/24/19  0622   WBC 12.21 10.81 9.99   HGB 14.1  12.9* 13.3*   HCT 41.6 37.6* 38.8*    145* 169     CMP:   Recent Labs   Lab 07/22/19  0824 07/23/19  0543 07/24/19  0622    137 137   K 4.1 3.7 3.9    104 102   CO2 26 23 24   * 94 93   BUN 12 15 15   CREATININE 0.8 0.8 0.8   CALCIUM 9.6 9.4 9.5   PROT 7.0 6.4 6.5   ALBUMIN 3.3* 2.8* 2.7*   BILITOT 1.5* 1.4* 1.1*   ALKPHOS 119 108 109   AST 20 19 23   ALT 18 15 16   ANIONGAP 8 10 11   EGFRNONAA >60 >60 >60   phos 3.1  Mag 1.8  Urine Studies:   Recent Labs   Lab 07/21/19  1505   COLORU Yellow   APPEARANCEUA Clear   PHUR 6.0   SPECGRAV 1.025   PROTEINUA Negative   GLUCUA Negative   KETONESU Negative   BILIRUBINUA Negative   OCCULTUA 1+*   NITRITE Negative   UROBILINOGEN 1.0   LEUKOCYTESUR Negative   RBCUA 7*   WBCUA 10*       Lactic 1.2>1.0  Lipase 20.   (looks like his baseline is ~400)  Lab Results   Component Value Date    INR 1.1 07/20/2019    INR 1.1 07/01/2019    INR 1.1 06/16/2019         pro calcitonin 0.05  Recent Labs   Lab 07/20/19 1952   TROPONINI 0.015     Susceptibility      Escherichia coli     CULTURE, URINE (Preliminary)     Amox/K Clav'ate <=8/4 mcg/mL Sensitive     Amp/Sulbactam <=8/4 mcg/mL Sensitive     Ampicillin <=8 mcg/mL Sensitive     Cefepime <=8 mcg/mL Sensitive     Ciprofloxacin <=1 mcg/mL Sensitive     Gentamicin <=4 mcg/mL Sensitive     Levofloxacin <=2 mcg/mL Sensitive     Nitrofurantoin <=32 mcg/mL Sensitive     Piperacillin/Tazo <=16 mcg/mL Sensitive     Tetracycline <=4 mcg/mL Sensitive     Tobramycin <=4 mcg/mL Sensitive     Trimeth/Sulfa <=2/38 mcg/mL Sensitive        Blood cultures x 2 NGTD    Flu negative      Significant Imaging:    CT head   1. There is no acute abnormality.  There is no hemorrhage, mass effect or obvious acute edema or ischemia.  2. There is moderate volume loss, ventriculomegaly and nonspecific white matter change.  3. Mild chronic sinus disease.    CXR 7/22   Suspect lingular and left upper lobe infiltrate      CXR  7/20  1.  Minimal left basilar atelectasis may be present.  Otherwise, there is no acute abnormality or change compared to the prior study.    EKG   Atrial fibrillation  Left axis deviation  Incomplete right bundle branch block  Nonspecific ST and T wave abnormality  Abnormal ECG  When compared with ECG of 01-JUL-2019 09:10,  No significant change was found  Confirmed by FABIAN RAMIREZ MD ([id number]) on 7/21/2019 1:14:11 PM    Pending Diagnostic Studies:     None         Medications:  Reconciled Home Medications:      Medication List      START taking these medications    acetaminophen 325 MG tablet  Commonly known as:  TYLENOL  Take 2 tablets (650 mg total) by mouth every 8 (eight) hours as needed.     clindamycin 300 MG capsule  Commonly known as:  CLEOCIN  Take 1 capsule (300 mg total) by mouth 3 (three) times daily.     lactobacillus acidophilus & bulgar 100 million cell packet  Commonly known as:  LACTINEX  Take 1 packet (1 each total) by mouth 2 (two) times daily.     levoFLOXacin 500 MG tablet  Commonly known as:  LEVAQUIN  Take 1 tablet (500 mg total) by mouth once daily.        CONTINUE taking these medications    albuterol 1.25 mg/3 mL Nebu  Commonly known as:  ACCUNEB  Take 1.25 mg by nebulization every 6 (six) hours as needed. Rescue     atorvastatin 40 MG tablet  Commonly known as:  LIPITOR  Take 40 mg by mouth once daily.     docusate sodium 50 MG capsule  Commonly known as:  COLACE  Take 100 mg by mouth as needed for Constipation (nightly PRN).     ELIQUIS 2.5 mg Tab  Generic drug:  apixaban  TAKE 1 TABLET ORALLY 2 TIMES A DAY.     ONE DAILY MULTIVITAMIN per tablet  Generic drug:  multivitamin  Take 1 tablet by mouth once daily.     sotalol 80 MG tablet  Commonly known as:  BETAPACE  Take 40 mg by mouth 2 (two) times daily.            Indwelling Lines/Drains at time of discharge:   Lines/Drains/Airways          None          Time spent on the discharge of patient: 30 minutes  Patient was seen and examined on the  date of discharge and determined to be suitable for discharge.         Charito Cameron NP  Department of Hospital Medicine  [facility]

## 2019-07-24 NOTE — PROGRESS NOTES
Vitals stable, afebrile. No complaints of pain. Transferring with staff from chair to bed. PPD done. Will need to be read today at 251pm. Diet changed to pureed, pt is keeping food in his mouth for long periods of time. Needs assistance with eating. Very hard of hearing. Disoriented to time, place, and situation. D/c to nursing home today. Discussed d/c instructions with pt and family. IV removed and site wrapped. AVS to be given to NH staff

## 2019-07-24 NOTE — PLAN OF CARE
07/24/19 1126   Discharge Reassessment   Assessment Type Final Discharge Note   Provided patient/caregiver education on the expected discharge date and the discharge plan Yes   Do you have any problems affording any of your prescribed medications? No   Discharge Plan A Skilled Nursing Facility   Discharge Plan B Home with family   DME Needed Upon Discharge  none   Patient choice form signed by patient/caregiver Yes   Anticipated Discharge Disposition SNF   Can the patient answer the patient profile reliably? No, cognitively impaired   Describe the patient's ability to walk at the present time. Major restrictions/daily assistance from another person   How often would a person be available to care for the patient? Whenever needed   Post-Acute Status   Post-Acute Authorization Placement   Post-Acute Placement Status Set-up Complete         Patient will discharge to [facility] today. Family and patient in agreement with plan of care. There are no other needs or concerns at this time.

## 2019-07-24 NOTE — PLAN OF CARE
07/24/19 1338   Medicare Message   Important Message from Medicare regarding Discharge Appeal Rights Explained to patient/caregiver   Date IMM was signed 07/24/19   Time IMM was signed 1330       Patient has dementia and unable to sign IMM. Patient's family awaiting his arrival at [facility]. Patient's caregiver in the room but unable to sign. SW contacted patient's daughter, Cecile, to discuss the IMM. Patient's daughter states understanding of right to appeal discharge decision.     Daya Galvan LMSW

## 2019-07-24 NOTE — PLAN OF CARE
07/24/19 1335   Final Note   Assessment Type Final Discharge Note   Anticipated Discharge Disposition SNF   What phone number can be called within the next 1-3 days to see how you are doing after discharge? [phone]   Hospital Follow Up  Appt(s) scheduled? Yes   Discharge plans and expectations educations in teach back method with documentation complete? Yes       Patient discharged to [facility] for skilled nursing and then long-term care.     Daya Galvan LMSW

## 2019-07-24 NOTE — PT/OT/SLP PROGRESS
Speech Language Pathology Treatment    Patient Name:  Hernesto Sofia   MRN:  [id number]  Admitting Diagnosis: Complicated UTI (urinary tract infection)    Recommendations:                 General Recommendations: SLP to isatu to follow for ongoing dynamic assessment of swallow physiology and for monitoring of diet tolerance  Diet recommendations: Solid Diet Level: Puree, Liquid Diet Level: Thin   Aspiration Precautions:   · 1 bite/sip at a time  · Alternating bites/sips  · HOB to 90 degrees  · Meds whole buried in puree  · Monitor for s/s of aspiration   General Precautions: Standard, fall, aspiration  Communication strategies:  provide increased time to answer    Subjective     Pt found resting in bed with sitter present at bedside upon SLP entry into room. Pt participated well within session but noted to have increased confusion as he asked the purpose of treatment session multiple times.    Patient goals: none stated     Pain/Comfort:  · Pain Rating 1: 0/10    Objective:     Has the patient been evaluated by SLP for swallowing?   Yes  Keep patient NPO? No   Current Respiratory Status: room air        Pt seen for continued assessment of swallow physiology to determine least restrictive PO diet. Personal sitter noted that pt consumed some breakfast this AM. Single trials of grits was completed with pt demonstrating MOD oral residue throughout anterior and lateral sulci and floor of mouth. Consistent MAX cueing was provided for pt to utilize lingual sweep but was ineffective. Multiple lingual washes were completed and were also ineffective in clearing residue to acceptable amounts. Trials of puree were conducted with no observable residue upon observation x3 trials. At termination of session, grits were still present within oral cavity (over 25 minutes following initial presentation). Downgrade in diet consistency was discussed with sitter and nursing staff and all verbalized understanding.     Assessment:      Hernesto Sofia is a 89 y.o. male who presents with increased oral residue when presented soft solids. Recommend downgrade to puree diet and thin liquids with continued safe swallow strategies including alternating bites/sips, check for pocketing/oral residue, eliminate distractions, HOB to 90 degrees, and meds whole buried in puree. SLP to continue to follow up for ongoing dynamic assessment of swallow function and to monitor for diet tolerance.    Goals:   Multidisciplinary Problems     SLP Goals     Not on file          Multidisciplinary Problems (Resolved)        Problem: SLP Goal    Goal Priority Disciplines Outcome   SLP Goal   (Resolved)     SLP Outcome(s) achieved   Description:    Long Term Goals:  1. Pt will tolerate least restrictive PO diet with no overt s/s of aspiration in order to maintain adequate hydration and nutrition                     Plan:     · Patient to be seen:  5 x/week   · Plan of Care expires:  08/06/19  · Plan of Care reviewed with:  patient, caregiver   · SLP Follow-Up:  Yes       Discharge recommendations:  nursing facility, skilled   Barriers to Discharge:  Level of Skilled Assistance Needed     Time Tracking:     SLP Treatment Date:   07/24/19  Speech Start Time:  0903  Speech Stop Time:  0930     Speech Total Time (min):  27 min    Billable Minutes: Treatment Swallowing Dysfunction - 27 minutes      Bella Shankar CCC-SLP  07/24/2019

## 2019-07-24 NOTE — ASSESSMENT & PLAN NOTE
NPO  Nebs and add clinda (allergy to PCN)  Speech consult done yesterday and modifications made to diet  Will cont clinda x 10 days total po at nursing home with nebs and probiotic

## 2019-07-24 NOTE — PROGRESS NOTES
[facility] Hospital Medicine  Progress Note    Patient Name: Hernesto Sofia  MRN: [id number]  Patient Class: IP- Inpatient   Admission Date: 7/20/2019  Length of Stay: 4 days  Attending Physician: Denton Polanco MD  Primary Care Provider: Hasmukh Pederson MD        Subjective:     Principal Problem:Complicated UTI (urinary tract infection)      HPI:  Hernesto Sofia presents to the emergency room with fever and confusion  Patient suffers from dementia but is more confused than typical per family  Patient was hard to walk today, was weak and confused at home this p.m.  Patient is alert but obviously demented, no obvious focal deficit noted today  Daughter states that pt has suffered from urinary tract infections in the past  Workup in the emergency room revealed patient had a significant urinary tract infection.  He has had these in the past.  He is going to be admitted for IV antibiotics and therapy.    Overview/Hospital Course:  7/22 he was placed inpatient on levaquin for UTI. Urine culture growing gram - but sensitivities pending. Low grade temps 99 over last 24hr. WBC better this am 10287 on admission to now 19111. Family at bedside reports AMS back to baseline    7/23 He is currently on levaquin. T max 100.7 yesterday 7pm. WBC down to normal [id number].     Last night poss aspiration. Tachypnia, POX low 90's. Nebs ordered. CXR shows left upper infiltrate. He is now NPO with speech consult this am.   Family at bedside denies that he chokes with eating. Talked to his daughter explained .    7/24 he remains on levaquin for ecoli sensitive UTI. Afebrile overnight and no elevated WBC.     Also now on modified diet after speech saw him for poss aspiration. Clinda added as CXR showed infiltrates. Also nebs started.     Plan is for d/c to [facility]. Will need cont therapy as he is more dependent with max assist for transfers    Interval note: AMS resolved. WBC better with levaquin as well as  u/a. Modified diet due to aspiration. clinda for pneumonia   Review of Systems   Unable to perform ROS: Dementia     Objective:     Vital Signs (Most Recent):  Temp: 98 °F (36.7 °C) (07/24/19 0744)  Pulse: 80 (07/24/19 1000)  Resp: 20 (07/24/19 0744)  BP: (!) 108/54 (07/24/19 0744)  SpO2: 96 % (07/24/19 0715) Vital Signs (24h Range):  Temp:  [96.3 °F (35.7 °C)-98.5 °F (36.9 °C)] 98 °F (36.7 °C)  Pulse:  [] 80  Resp:  [18-20] 20  SpO2:  [94 %-96 %] 96 %  BP: (101-149)/(51-84) 108/54     Weight: 66.9 kg (147 lb 7.8 oz)  Body mass index is 23.1 kg/m².    Physical Exam   Constitutional: He appears well-developed and well-nourished. No distress.   HENT:   Head: Normocephalic and atraumatic.   Right Ear: External ear normal.   Left Ear: External ear normal.   Eyes: Pupils are equal, round, and reactive to light. Conjunctivae and EOM are normal.   Neck: Neck supple. No tracheal deviation present.   Cardiovascular: Normal rate, regular rhythm and normal heart sounds.   No murmur heard.  Pulmonary/Chest: Effort normal and breath sounds normal. No respiratory distress.   Abdominal: Soft. Bowel sounds are normal. He exhibits no distension.   Musculoskeletal: He exhibits no edema or tenderness.   Neurological: He is alert. No cranial nerve deficit.   Lying in bed eating breakfast.  Pleasantly demented.     Nursing note and vitals reviewed.        CRANIAL NERVES     CN III, IV, VI   Pupils are equal, round, and reactive to light.  Extraocular motions are normal.        Significant Labs:   CBC:   Recent Labs   Lab 07/23/19  0543 07/24/19 0622   WBC 10.81 9.99   HGB 12.9* 13.3*   HCT 37.6* 38.8*   * 169     CMP:   Recent Labs   Lab 07/23/19  0543 07/24/19 0622    137   K 3.7 3.9    102   CO2 23 24   GLU 94 93   BUN 15 15   CREATININE 0.8 0.8   CALCIUM 9.4 9.5   PROT 6.4 6.5   ALBUMIN 2.8* 2.7*   BILITOT 1.4* 1.1*   ALKPHOS 108 109   AST 19 23   ALT 15 16   ANIONGAP 10 11   EGFRNONAA >60 >60   phos  3.1  Mag 1.8  Urine Studies:   Recent Labs   Lab 07/21/19  1505   COLORU Yellow   APPEARANCEUA Clear   PHUR 6.0   SPECGRAV 1.025   PROTEINUA Negative   GLUCUA Negative   KETONESU Negative   BILIRUBINUA Negative   OCCULTUA 1+*   NITRITE Negative   UROBILINOGEN 1.0   LEUKOCYTESUR Negative   RBCUA 7*   WBCUA 10*       Lactic 1.2>1.0  Lipase 20.   (looks like his baseline is ~400)  Lab Results   Component Value Date    INR 1.1 07/20/2019    INR 1.1 07/01/2019    INR 1.1 06/16/2019         pro calcitonin 0.05  Recent Labs   Lab 07/20/19  1952   TROPONINI 0.015     Susceptibility      Escherichia coli     CULTURE, URINE (Preliminary)     Amox/K Clav'ate <=8/4 mcg/mL Sensitive     Amp/Sulbactam <=8/4 mcg/mL Sensitive     Ampicillin <=8 mcg/mL Sensitive     Cefepime <=8 mcg/mL Sensitive     Ciprofloxacin <=1 mcg/mL Sensitive     Gentamicin <=4 mcg/mL Sensitive     Levofloxacin <=2 mcg/mL Sensitive     Nitrofurantoin <=32 mcg/mL Sensitive     Piperacillin/Tazo <=16 mcg/mL Sensitive     Tetracycline <=4 mcg/mL Sensitive     Tobramycin <=4 mcg/mL Sensitive     Trimeth/Sulfa <=2/38 mcg/mL Sensitive        Blood cultures x 2 NGTD    Flu negative      Significant Imaging:    CT head   1. There is no acute abnormality.  There is no hemorrhage, mass effect or obvious acute edema or ischemia.  2. There is moderate volume loss, ventriculomegaly and nonspecific white matter change.  3. Mild chronic sinus disease.    CXR 7/22   Suspect lingular and left upper lobe infiltrate      CXR  7/20  1. Minimal left basilar atelectasis may be present.  Otherwise, there is no acute abnormality or change compared to the prior study.    EKG   Atrial fibrillation  Left axis deviation  Incomplete right bundle branch block  Nonspecific ST and T wave abnormality  Abnormal ECG  When compared with ECG of 01-JUL-2019 09:10,  No significant change was found  Confirmed by FABIAN RAMIREZ MD ([id number]) on 7/21/2019 1:14:11 PM      Assessment/Plan:      *  Complicated UTI (urinary tract infection)  Urine culture reviewed sensitive to levaquin  Repeat urinalysis yesterday better  Levaquin 750 mg IV piggyback Q 24 hr cont x 7 days will transition to po  Continue IV fluids 75 cc/hr- d/c yesterday as bp elevated.    Aspiration pneumonia  NPO  Nebs and add clinda (allergy to PCN)  Speech consult done yesterday and modifications made to diet  Will cont clinda x 10 days total po at nursing home with nebs and probiotic       Alzheimer's disease  Patient's DNR status will be continued per family request.      Acute confusional state  Probably from the urinary tract infection.    Back to baseline this am  Cont antibiotics    HTN (hypertension)  Cont sotalol  D/c ivf.      HLD (hyperlipidemia)  Cont statin.      Atrial fibrillation with RVR  Continue Eliquis and sotalol  HR 87-92.        VTE Risk Mitigation (From admission, onward)        Ordered     apixaban tablet 2.5 mg  2 times daily      07/21/19 0821     IP VTE HIGH RISK PATIENT  Once      07/21/19 0028     Place sequential compression device  Until discontinued      07/21/19 0028                Bryant Cummings MD  Department of Hospital Medicine   [facility]

## 2019-07-24 NOTE — PLAN OF CARE
Problem: Adult Inpatient Plan of Care  Goal: Plan of Care Review  Outcome: Ongoing (interventions implemented as appropriate)  Quiet hours, rested well. Son at bedside during the night. Pt remains confused, awake, easily aroused. Tolerated PM meds without difficulty. Afib on tele monitor. IV antibiotics given as ordered throughout the night. Repositioned every 2 hours, pt works his way to his own comfortable position. Incontinent to diapers, changed every 2 hrs and as needed. Plan of care reviewed with pt/son, voiced understanding. SR up X3, call bell in reach. Instructed to call for needs or assistance.

## 2019-07-24 NOTE — PT/OT/SLP DISCHARGE
Physical Therapy Discharge Summary    Name: Hernesto Sofia  MRN: [id number]   Principal Problem: Complicated UTI (urinary tract infection)     Patient Discharged from acute Physical Therapy on 19.  Please refer to prior PT noted date on 19 for functional status.     Assessment:     Patient appropriate for care in another setting. Patient has not met goals.    Objective:     GOALS:   Multidisciplinary Problems     Physical Therapy Goals     Not on file          Multidisciplinary Problems (Resolved)        Problem: Physical Therapy Goal    Goal Priority Disciplines Outcome Goal Variances Interventions   Physical Therapy Goal   (Resolved)     PT, PT/OT Outcome(s) achieved     Description:  Goals to be met by:      Patient will increase functional independence with mobility by performin. Supine to sit with Stand-by Assistance  2. Sit to supine with Stand-by Assistance  3. Sit to stand transfer with Stand-by Assistance with RW  4. Gait  x 50 feet with Contact Guard Assistance using Rolling Walker.                       Reasons for Discontinuation of Therapy Services  Transfer to alternate level of care.      Plan:     Patient Discharged to: Skilled Nursing Facility at [facility].    Reece Melara, PT  2019

## 2019-07-24 NOTE — SUBJECTIVE & OBJECTIVE
Interval note: AMS resolved. WBC better with levaquin as well as u/a. Modified diet due to aspiration. clinda for pneumonia   Review of Systems   Unable to perform ROS: Dementia     Objective:     Vital Signs (Most Recent):  Temp: 98 °F (36.7 °C) (07/24/19 0744)  Pulse: 100 (07/24/19 0744)  Resp: 20 (07/24/19 0744)  BP: (!) 108/54 (07/24/19 0744)  SpO2: 96 % (07/24/19 0715) Vital Signs (24h Range):  Temp:  [96.3 °F (35.7 °C)-98.5 °F (36.9 °C)] 98 °F (36.7 °C)  Pulse:  [] 100  Resp:  [18-20] 20  SpO2:  [94 %-96 %] 96 %  BP: (101-149)/(51-84) 108/54     Weight: 66.9 kg (147 lb 7.8 oz)  Body mass index is 23.1 kg/m².    Physical Exam   Constitutional: He appears well-developed and well-nourished. No distress.   HENT:   Head: Normocephalic and atraumatic.   Right Ear: External ear normal.   Left Ear: External ear normal.   Eyes: Pupils are equal, round, and reactive to light. Conjunctivae and EOM are normal.   Neck: Neck supple. No tracheal deviation present.   Cardiovascular: Normal rate, regular rhythm and normal heart sounds.   No murmur heard.  Pulmonary/Chest: Effort normal and breath sounds normal. No respiratory distress.   Abdominal: Soft. Bowel sounds are normal. He exhibits no distension.   Musculoskeletal: He exhibits no edema or tenderness.   Neurological: He is alert. No cranial nerve deficit.   Lying in bed eating breakfast.  Pleasantly demented.     Nursing note and vitals reviewed.        CRANIAL NERVES     CN III, IV, VI   Pupils are equal, round, and reactive to light.  Extraocular motions are normal.        Significant Labs:   CBC:   Recent Labs   Lab 07/22/19 0824 07/23/19  0543 07/24/19  0622   WBC 12.21 10.81 9.99   HGB 14.1 12.9* 13.3*   HCT 41.6 37.6* 38.8*    145* 169     CMP:   Recent Labs   Lab 07/22/19  0824 07/23/19  0543 07/24/19  0622    137 137   K 4.1 3.7 3.9    104 102   CO2 26 23 24   * 94 93   BUN 12 15 15   CREATININE 0.8 0.8 0.8   CALCIUM 9.6 9.4 9.5    PROT 7.0 6.4 6.5   ALBUMIN 3.3* 2.8* 2.7*   BILITOT 1.5* 1.4* 1.1*   ALKPHOS 119 108 109   AST 20 19 23   ALT 18 15 16   ANIONGAP 8 10 11   EGFRNONAA >60 >60 >60   phos 3.1  Mag 1.8  Urine Studies:   Recent Labs   Lab 07/21/19  1505   COLORU Yellow   APPEARANCEUA Clear   PHUR 6.0   SPECGRAV 1.025   PROTEINUA Negative   GLUCUA Negative   KETONESU Negative   BILIRUBINUA Negative   OCCULTUA 1+*   NITRITE Negative   UROBILINOGEN 1.0   LEUKOCYTESUR Negative   RBCUA 7*   WBCUA 10*       Lactic 1.2>1.0  Lipase 20.   (looks like his baseline is ~400)  Lab Results   Component Value Date    INR 1.1 07/20/2019    INR 1.1 07/01/2019    INR 1.1 06/16/2019         pro calcitonin 0.05  Recent Labs   Lab 07/20/19  1952   TROPONINI 0.015     Susceptibility      Escherichia coli     CULTURE, URINE (Preliminary)     Amox/K Clav'ate <=8/4 mcg/mL Sensitive     Amp/Sulbactam <=8/4 mcg/mL Sensitive     Ampicillin <=8 mcg/mL Sensitive     Cefepime <=8 mcg/mL Sensitive     Ciprofloxacin <=1 mcg/mL Sensitive     Gentamicin <=4 mcg/mL Sensitive     Levofloxacin <=2 mcg/mL Sensitive     Nitrofurantoin <=32 mcg/mL Sensitive     Piperacillin/Tazo <=16 mcg/mL Sensitive     Tetracycline <=4 mcg/mL Sensitive     Tobramycin <=4 mcg/mL Sensitive     Trimeth/Sulfa <=2/38 mcg/mL Sensitive        Blood cultures x 2 NGTD    Flu negative      Significant Imaging:    CT head   1. There is no acute abnormality.  There is no hemorrhage, mass effect or obvious acute edema or ischemia.  2. There is moderate volume loss, ventriculomegaly and nonspecific white matter change.  3. Mild chronic sinus disease.    CXR 7/22   Suspect lingular and left upper lobe infiltrate      CXR  7/20  1. Minimal left basilar atelectasis may be present.  Otherwise, there is no acute abnormality or change compared to the prior study.    EKG   Atrial fibrillation  Left axis deviation  Incomplete right bundle branch block  Nonspecific ST and T wave abnormality  Abnormal  ECG  When compared with ECG of 01-JUL-2019 09:10,  No significant change was found  Confirmed by FABIAN RAMIREZ MD ([id number]) on 7/21/2019 1:14:11 PM

## 2019-07-24 NOTE — ASSESSMENT & PLAN NOTE
Urine culture reviewed sensitive to levaquin  Repeat urinalysis yesterday better  Levaquin 750 mg IV piggyback Q 24 hr cont x 7 days will transition to po  Continue IV fluids 75 cc/hr- d/c yesterday as bp elevated.

## 2019-07-24 NOTE — PT/OT/SLP PROGRESS
Occupational Therapy      Patient Name:  Hernesto Sofia   MRN:  [id number]    Patient not seen today secondary to patient discharged before OT eval could be completed.    Petty Florentino OT  7/24/2019

## 2019-07-24 NOTE — DISCHARGE INSTRUCTIONS
Understanding Aspiration from Dysphagia  Aspiration is when something enters your airway or lungs by accident. It may be food, liquid, or some other material. This can cause serious health problems, such as pneumonia. Aspiration can happen when you have trouble swallowing normally. This is known as dysphagia.  What happens when you swallow?  When you swallow food, it passes from your mouth down into your throat (pharynx). From there, the food moves down through a long tube (esophagus) and into your stomach. This journey is made possible by a series of actions from the muscles in these areas.  The pharynx is also part of the system that brings air into your lungs. When you breathe, air enters your mouth and moves into the pharynx. The air then goes down into your main airway (trachea) and into your lungs. A flap of tissue called the epiglottis sits over the top of the trachea. This flap blocks food and drink from going down into the trachea when you swallow.  What causes aspiration?  Aspiration from dysphagia is caused when the muscles in your throat dont work normally. This lets food or drink enter the trachea when you swallow. This can happen as food goes down when you swallow. Or it can happen if food comes back up from your stomach.  When a person has dysphagia, aspiration is always a risk. You may be at risk for aspiration from dysphagia if you have any of these medical conditions:  · Stroke  · Severe dental problems  · Conditions that lead to less saliva, such as Sjogrens syndrome  · Mouth sores  · Parkinson disease or other neurologic conditions  · Muscular dystrophies  · Blockage in the esophagus, such as a growth from cancer  · History of radiation or surgery to treat throat cancer  Symptoms of aspiration from dysphagia  Some people who aspirate do not have any signs or symptoms. This is called silent aspiration. But aspiration can cause symptoms such as:  · Sense of food sticking in your throat or  coming back into your mouth  · Pain when swallowing  · Trouble starting a swallow  · Coughing or wheezing after eating  · Chest discomfort or heartburn  · Fever 30 minutes to an hour after eating  · Too much saliva  · Feeling congested after eating or drinking  · Having a wet-sounding voice during or after eating or drinking  · Shortness of breath or tiredness while eating  · Vomiting blood  · Pneumonia that comes back again and again  Symptoms can happen right after eating. Or they may happen over time. You may not have all of these symptoms. They may depend on how often and how much food or drink you aspirate.  Diagnosing aspiration from dysphagia  You will need to be checked for aspiration from dysphagia if you have symptoms. You may also need to be checked if you have had a stroke or other health problem that can cause trouble swallowing. If your healthcare provider thinks you may be aspirating, you may be told to not eat or drink until you are tested.  Your healthcare provider will ask about your medical history and symptoms. This may be done by a speech-language pathologist (SLP). The SLP will try to find out if you have problems with the lower or upper part of your swallowing muscles. The SLP may ask about what foods or drink cause problems, and when your symptoms occur.  You may have a physical exam. This may include an exam of your teeth, lips, jaws, tongue, and cheeks. You may be asked to move these areas in certain ways and make certain sounds. Your SLP may also test how you swallow different types of liquids and solids.  You may also need 1 or more tests. These can help to find the cause of your dysphagia. Tests are often very helpful in showing cases of silent aspiration. The test may include:  · Modified barium swallow test (MBS). This is done to show if material is going into your lungs.  · Fiberoptic endoscopic evaluation of swallowing (FEES). This test can also show if material is going into your  lungs  · Pharyngeal manometry. This test checks the pressure inside your esophagus  Date Last Reviewed: 3/1/2017  © 3132-0668 [employer]. [address] [place] [address], [place] [address]. All rights reserved. This information is not intended as a substitute for professional medical care. Always follow your healthcare professional's instructions.        Bladder Infection, Male (Adult)    You have a bladder infection.  Urine is normally free of bacteria. But bacteria can get into the urinary tract from the skin around the rectum or it may travel in the blood from elsewhere in the body.  This is called a urinary tract infection (UTI). An infection can occur anywhere in the urinary tract. It could be in a kidney (pyelonephritis)or in the bladder (cystitis) and urethra (urethritis). The urethra is the tube that drains the urine from the bladder through the tip of the penis.  The most common place for a UTI is in the bladder. This is called a bladder infection. Most bladder infections are easily treated. They are not serious unless the infection spreads up to the kidney.  The terms bladder infection, UTI, and cystitis are often used to describe the same thing, but they arent always the same. Cystitis is an inflammation of the bladder. The most common cause of cystitis is an infection.   Keep in mind:  · Infections in the urine are called UTIs.  · Cystitis is usually caused by a UTI.  · Not all UTIs and cases of cystitis are bladder infections.  · Bladder infections are the most common type of cystitis.  Symptoms of a bladder infection  The infection causes inflammation in the urethra and bladder. This inflammation causes many of the symptoms. The most common symptoms of a bladder infection are:  · Pain or burning when urinating  · Having to go more often than usual  · Feeling like you need to go right away  · Only a small amount comes out  · Blood in urine  · Discomfort in your belly (abdomen), usually in the  lower abdomen, above the pubic bone  · Cloudy, strong, or bad smelling urine  · Unable to urinate (retention)  · Urinary incontinence  · Fever  · Loss of appetite  Older adults may also feel confused.  Causes of a bladder infection  Bladder infections are not contagious. You can't get one from someone else, from a toilet seat, or from sharing a bath.  The most common cause of bladder infections is bacteria from the bowels. The bacteria get onto the skin around the opening of the urethra. From there they can get into the urine and travel up to the bladder. This causes inflammation and an infection. This usually happens because of:  · An enlarged prostate  · Poor cleaning of the genitals  · Procedures that put a tube in your bladder, like a Villanueva catheter  · Bowel incontinence  · Older age  · Not emptying your bladder (The urine stays there, giving the bacteria a chance to grow.)  · Dehydration (This allows urine to stay in the bladder longer.)  · Constipation (This can cause the bowels to push on the bladder or urethra and keep the bladder from emptying.)  Treatment  Bladder infections are treated with antibiotics. They usually clear up quickly without complications. Treatment helps prevent a more serious kidney infection.  Medicines  Medicines can help in the treatment of a bladder infection:  · You may have been given phenazopyridine to ease burning when you urinate. It will cause your urine to be bright orange. It can stain clothing.  · You may have been prescribed antibiotics. Take this medicine until you have finished it, even if you feel better. Taking all of the medicine will make sure the infection has cleared.  You can use acetaminophen or ibuprofen for pain, fever, or discomfort, unless another medicine was prescribed. You can also alternate them, or use both together. They work differently and are a different class of medicines, so taking them together is not an overdose. If you have chronic liver or  kidney disease, talk with your healthcare provider before using these medicines. Also talk with your provider if youve had a stomach ulcer or GI bleeding or are taking blood thinner medicines.  Home care  Here are some guidelines to help you care for yourself at home:  · Drink plenty of fluids, unless your healthcare provider told you not to. Fluids will prevent dehydration and flush out your bladder.  · Use good personal hygiene. Wipe from front to back after using the toilet, and clean your penis regularly. If you arent circumcised, retract the foreskin when cleaning.  · Urinate more frequently, and dont try to hold it in for long periods of time, if possible.  · Wear loose-fitting clothes and cotton underwear. Avoid tight-fitting pants. This helps keep you clean and dry.  · Change your diet to prevent constipation. This means eating more fresh foods and more fiber, and less junk and fatty foods.  · Avoid sex until your symptoms are gone.  · Avoid caffeine, alcohol, and spicy foods. These can irritate the bladder.  Follow-up care  Follow up with your healthcare provider, or as advised if all symptoms have not cleared up within 5 days. It is important to keep your follow-up appointment. You can talk with your provider to see if you need more tests of the urinary tract. This is especially important if you have infections that keep coming back.  If a culture was done, you will be told if your treatment needs to be changed. If directed, you can call to find out the results.  If X-rays were taken, you will be told of any findings that may affect your care.  Call 911  Call 911 if any of these occur:  · Trouble breathing  · Difficulty waking up  · Feeling confused  · Fainting or loss of consciousness  · Rapid heart rate  When to seek medical advice  Call your healthcare provider right away if any of these occur:  · Fever of 100.4ºF (38ºC) or higher, or as directed by your healthcare provider  · Your symptoms dont  improve after 2 days of treatment  · Back or abdominal pain that gets worse  · Repeated vomiting, or you arent able to keep medicine down  · Weakness or dizziness  Date Last Reviewed: 10/1/2016  © 9075-4958 [employer] [address] [place] [address], [place] [address]. All rights reserved. This information is not intended as a substitute for professional medical care. Always follow your healthcare professional's instructions.        Dementia and Caregiver Support  Dementia is a chronic condition that affects the brain. It causes a gradual loss of memory and higher intellectual functions. A person with dementia may have trouble recognizing familiar people and places, or knowing what day it is. The persons memory, judgment, and decision-making may also be affected. In severe cases, the person may not respond when someone talks to him or her.  The most common form of dementia is Alzheimers disease. Doctors dont fully understand what causes AD. It has no cure. But medicines can treat some of the symptoms.  Some of the less common causes for dementia are curable. So its important to have a complete medical evaluation to look for conditions that can be treated.  Home care  These tips can help you care for a person with AD at home:  · A responsible person must be with the person who has advanced AD at all times.  He or she should not be left alone or unsupervised.  · In the case of advanced AD, keep all medicines in a secure place. They should be under the caregivers control. A person with advanced AD should not be allowed to take his or her own medicines. This needs to be supervised by the caregiver.  Here are ways to help a person with dementia:  Activities  Keep to a daily routine. Changes in routine can cause stress for someone with dementia. Make a schedule for common daily tasks. These include bathing, dressing, taking medicines, eating meals, going for walks, and going to bed.  Communication  When  talking to a person with dementia, talk slowly and clearly. Use a gentle tone of voice. Choose short, simple words and sentences. Ask one question at a time. Dont interrupt, criticize, or argue. Be calm and supportive. Use friendly facial expressions. Use pointing and touching to help communicate. If the person has a loss of long-term memory, dont ask questions about past events. Instead, talk about what is happening now.  Behavioral tips  Use lists, signs, family photos, clocks, and calendars as memory aids. Label cabinets and drawers. Try to distract, not confront, the person. When he or she becomes frustrated or upset, direct the persons attention to eating or some other interesting activity.  Medical-legal tips  Talk with your doctor or  about getting a power of  for healthcare and for financial decisions. It is best to do this while the person can still sign legal documents and make his or her own legal decisions. Otherwise, you'll need a court order.  Support for the caregiver  As the caregiver, you will need a lot of support for yourself. Caring for a person with dementia is a full-time job. It can drain your emotions and lead to frustration and anger toward the one you love. It is common to have feelings of grief over losing the relationship that you once had. As a caregiver to someone with dementia, you are at higher risk for depression, anxiety and stress.  Here are some tips to help you cope with being a caregiver:  · Learn about dementia and Alzheimers disease so you know what to expect.  · Find out about the resources in your community, including adult day-care programs. Ask your healthcare provider for a referral to a , if needed.  · Take care of yourself with a healthy diet, exercise, and plenty of rest.  · Ask for help. Share some of the caretaking duties with family and friends.  · Make personal time for yourself. This is essential! Consider hiring an in-home sitter or  home health aide.  · Seek counseling or join a caregivers support group. Don't isolate yourself or try to cope with this alone. In a support group, you can learn from others in a similar situation.  · Visit the Alzheimers Association website (www.alz.org) for more information.  Follow-up care  Follow up with the persons healthcare provider, or as advised.  When to seek medical advice  Call your healthcare provider right away if any of these occur:  · Frequent falls  · The person refuses to eat or drink  · Violent behavior or behavior becomes too difficult to manage at home  · Increased drowsiness, or failure to respond normally  · Headache or nausea that gets worse, or repeated vomiting  · Numbness or weakness of the face, an arm, or a leg  · Slurred speech, trouble speaking, walking, or seeing  · Fainting spell, dizziness, or seizure (staring spells, lip-smacking, twitching, sudden changes in mental status)  · Unexplained fever of 100.4º F (38.0º C) or higher  Date Last Reviewed: 8/1/2016 © 2000-2017 [employer]. [address] [place] [id number]. All rights reserved. This information is not intended as a substitute for professional medical care. Always follow your healthcare professional's instructions.

## 2019-07-24 NOTE — SUBJECTIVE & OBJECTIVE
Interval note: AMS resolved. WBC better with levaquin as well as u/a. Modified diet due to aspiration. clinda for pneumonia   Review of Systems   Unable to perform ROS: Dementia     Objective:     Vital Signs (Most Recent):  Temp: 98 °F (36.7 °C) (07/24/19 0744)  Pulse: 80 (07/24/19 1000)  Resp: 20 (07/24/19 0744)  BP: (!) 108/54 (07/24/19 0744)  SpO2: 96 % (07/24/19 0715) Vital Signs (24h Range):  Temp:  [96.3 °F (35.7 °C)-98.5 °F (36.9 °C)] 98 °F (36.7 °C)  Pulse:  [] 80  Resp:  [18-20] 20  SpO2:  [94 %-96 %] 96 %  BP: (101-149)/(51-84) 108/54     Weight: 66.9 kg (147 lb 7.8 oz)  Body mass index is 23.1 kg/m².    Physical Exam   Constitutional: He appears well-developed and well-nourished. No distress.   HENT:   Head: Normocephalic and atraumatic.   Right Ear: External ear normal.   Left Ear: External ear normal.   Eyes: Pupils are equal, round, and reactive to light. Conjunctivae and EOM are normal.   Neck: Neck supple. No tracheal deviation present.   Cardiovascular: Normal rate, regular rhythm and normal heart sounds.   No murmur heard.  Pulmonary/Chest: Effort normal and breath sounds normal. No respiratory distress.   Abdominal: Soft. Bowel sounds are normal. He exhibits no distension.   Musculoskeletal: He exhibits no edema or tenderness.   Neurological: He is alert. No cranial nerve deficit.   Lying in bed eating breakfast.  Pleasantly demented.     Nursing note and vitals reviewed.        CRANIAL NERVES     CN III, IV, VI   Pupils are equal, round, and reactive to light.  Extraocular motions are normal.        Significant Labs:   CBC:   Recent Labs   Lab 07/23/19 0543 07/24/19 0622   WBC 10.81 9.99   HGB 12.9* 13.3*   HCT 37.6* 38.8*   * 169     CMP:   Recent Labs   Lab 07/23/19 0543 07/24/19 0622    137   K 3.7 3.9    102   CO2 23 24   GLU 94 93   BUN 15 15   CREATININE 0.8 0.8   CALCIUM 9.4 9.5   PROT 6.4 6.5   ALBUMIN 2.8* 2.7*   BILITOT 1.4* 1.1*   ALKPHOS 108 109   AST 19  23   ALT 15 16   ANIONGAP 10 11   EGFRNONAA >60 >60   phos 3.1  Mag 1.8  Urine Studies:   Recent Labs   Lab 07/21/19  1505   COLORU Yellow   APPEARANCEUA Clear   PHUR 6.0   SPECGRAV 1.025   PROTEINUA Negative   GLUCUA Negative   KETONESU Negative   BILIRUBINUA Negative   OCCULTUA 1+*   NITRITE Negative   UROBILINOGEN 1.0   LEUKOCYTESUR Negative   RBCUA 7*   WBCUA 10*       Lactic 1.2>1.0  Lipase 20.   (looks like his baseline is ~400)  Lab Results   Component Value Date    INR 1.1 07/20/2019    INR 1.1 07/01/2019    INR 1.1 06/16/2019         pro calcitonin 0.05  Recent Labs   Lab 07/20/19  1952   TROPONINI 0.015     Susceptibility      Escherichia coli     CULTURE, URINE (Preliminary)     Amox/K Clav'ate <=8/4 mcg/mL Sensitive     Amp/Sulbactam <=8/4 mcg/mL Sensitive     Ampicillin <=8 mcg/mL Sensitive     Cefepime <=8 mcg/mL Sensitive     Ciprofloxacin <=1 mcg/mL Sensitive     Gentamicin <=4 mcg/mL Sensitive     Levofloxacin <=2 mcg/mL Sensitive     Nitrofurantoin <=32 mcg/mL Sensitive     Piperacillin/Tazo <=16 mcg/mL Sensitive     Tetracycline <=4 mcg/mL Sensitive     Tobramycin <=4 mcg/mL Sensitive     Trimeth/Sulfa <=2/38 mcg/mL Sensitive        Blood cultures x 2 NGTD    Flu negative      Significant Imaging:    CT head   1. There is no acute abnormality.  There is no hemorrhage, mass effect or obvious acute edema or ischemia.  2. There is moderate volume loss, ventriculomegaly and nonspecific white matter change.  3. Mild chronic sinus disease.    CXR 7/22   Suspect lingular and left upper lobe infiltrate      CXR  7/20  1. Minimal left basilar atelectasis may be present.  Otherwise, there is no acute abnormality or change compared to the prior study.    EKG   Atrial fibrillation  Left axis deviation  Incomplete right bundle branch block  Nonspecific ST and T wave abnormality  Abnormal ECG  When compared with ECG of 01-JUL-2019 09:10,  No significant change was found  Confirmed by FABIAN RAMIREZ MD  ([id number]) on 7/21/2019 1:14:11 PM

## 2019-07-26 LAB
BACTERIA BLD CULT: NORMAL
BACTERIA BLD CULT: NORMAL

## 2019-08-02 ENCOUNTER — HOSPITAL ENCOUNTER (EMERGENCY)
Facility: HOSPITAL | Age: 84
Discharge: HOSPICE | End: 2019-08-02
Attending: SURGERY
Payer: MEDICARE

## 2019-08-02 VITALS
RESPIRATION RATE: 25 BRPM | BODY MASS INDEX: 23.54 KG/M2 | HEIGHT: 67 IN | SYSTOLIC BLOOD PRESSURE: 126 MMHG | HEART RATE: 93 BPM | OXYGEN SATURATION: 100 % | WEIGHT: 150 LBS | TEMPERATURE: 99 F | DIASTOLIC BLOOD PRESSURE: 94 MMHG

## 2019-08-02 DIAGNOSIS — T17.908A ASPIRATION INTO AIRWAY, INITIAL ENCOUNTER: ICD-10-CM

## 2019-08-02 DIAGNOSIS — Z51.5 HOSPICE CARE PATIENT: Primary | ICD-10-CM

## 2019-08-02 DIAGNOSIS — R53.83 FATIGUE: ICD-10-CM

## 2019-08-02 LAB
ALBUMIN SERPL BCP-MCNC: 2.6 G/DL (ref 3.5–5.2)
ALP SERPL-CCNC: 106 U/L (ref 55–135)
ALT SERPL W/O P-5'-P-CCNC: 20 U/L (ref 10–44)
ANION GAP SERPL CALC-SCNC: 13 MMOL/L (ref 8–16)
AST SERPL-CCNC: 33 U/L (ref 10–40)
BACTERIA #/AREA URNS HPF: NORMAL /HPF
BASOPHILS # BLD AUTO: 0.04 K/UL (ref 0–0.2)
BASOPHILS NFR BLD: 0.2 % (ref 0–1.9)
BILIRUB SERPL-MCNC: 1.4 MG/DL (ref 0.1–1)
BILIRUB UR QL STRIP: ABNORMAL
BNP SERPL-MCNC: 189 PG/ML (ref 0–99)
BUN SERPL-MCNC: 21 MG/DL (ref 10–30)
CALCIUM SERPL-MCNC: 9.4 MG/DL (ref 8.7–10.5)
CHLORIDE SERPL-SCNC: 101 MMOL/L (ref 95–110)
CK MB SERPL-MCNC: 1.8 NG/ML (ref 0.1–6.5)
CK MB SERPL-RTO: 1.3 % (ref 0–5)
CK SERPL-CCNC: 136 U/L (ref 20–200)
CK SERPL-CCNC: 136 U/L (ref 20–200)
CLARITY UR: CLEAR
CO2 SERPL-SCNC: 22 MMOL/L (ref 23–29)
COLOR UR: YELLOW
CREAT SERPL-MCNC: 0.7 MG/DL (ref 0.5–1.4)
DIFFERENTIAL METHOD: ABNORMAL
EOSINOPHIL # BLD AUTO: 0 K/UL (ref 0–0.5)
EOSINOPHIL NFR BLD: 0.1 % (ref 0–8)
ERYTHROCYTE [DISTWIDTH] IN BLOOD BY AUTOMATED COUNT: 12.8 % (ref 11.5–14.5)
EST. GFR  (AFRICAN AMERICAN): >60 ML/MIN/1.73 M^2
EST. GFR  (NON AFRICAN AMERICAN): >60 ML/MIN/1.73 M^2
GLUCOSE SERPL-MCNC: 108 MG/DL (ref 70–110)
GLUCOSE UR QL STRIP: NEGATIVE
HCT VFR BLD AUTO: 37.5 % (ref 40–54)
HGB BLD-MCNC: 12.8 G/DL (ref 14–18)
HGB UR QL STRIP: NEGATIVE
HYALINE CASTS #/AREA URNS LPF: 0 /LPF
IMM GRANULOCYTES # BLD AUTO: 0.09 K/UL (ref 0–0.04)
IMM GRANULOCYTES NFR BLD AUTO: 0.5 % (ref 0–0.5)
INFLUENZA A, MOLECULAR: NEGATIVE
INFLUENZA B, MOLECULAR: NEGATIVE
KETONES UR QL STRIP: NEGATIVE
LACTATE SERPL-SCNC: 1.9 MMOL/L (ref 0.5–2.2)
LEUKOCYTE ESTERASE UR QL STRIP: NEGATIVE
LYMPHOCYTES # BLD AUTO: 1.1 K/UL (ref 1–4.8)
LYMPHOCYTES NFR BLD: 6.1 % (ref 18–48)
MCH RBC QN AUTO: 31.3 PG (ref 27–31)
MCHC RBC AUTO-ENTMCNC: 34.1 G/DL (ref 32–36)
MCV RBC AUTO: 92 FL (ref 82–98)
MICROSCOPIC COMMENT: NORMAL
MONOCYTES # BLD AUTO: 1.6 K/UL (ref 0.3–1)
MONOCYTES NFR BLD: 9.3 % (ref 4–15)
NEUTROPHILS # BLD AUTO: 14.6 K/UL (ref 1.8–7.7)
NEUTROPHILS NFR BLD: 83.8 % (ref 38–73)
NITRITE UR QL STRIP: NEGATIVE
NRBC BLD-RTO: 0 /100 WBC
PH UR STRIP: 5 [PH] (ref 5–8)
PLATELET # BLD AUTO: 288 K/UL (ref 150–350)
PMV BLD AUTO: 9.8 FL (ref 9.2–12.9)
POTASSIUM SERPL-SCNC: 4.4 MMOL/L (ref 3.5–5.1)
PROT SERPL-MCNC: 6.9 G/DL (ref 6–8.4)
PROT UR QL STRIP: ABNORMAL
RBC # BLD AUTO: 4.09 M/UL (ref 4.6–6.2)
RBC #/AREA URNS HPF: 0 /HPF (ref 0–4)
SODIUM SERPL-SCNC: 136 MMOL/L (ref 136–145)
SP GR UR STRIP: >=1.03 (ref 1–1.03)
SPECIMEN SOURCE: NORMAL
TROPONIN I SERPL DL<=0.01 NG/ML-MCNC: 0.01 NG/ML (ref 0–0.03)
URN SPEC COLLECT METH UR: ABNORMAL
UROBILINOGEN UR STRIP-ACNC: ABNORMAL EU/DL
WBC # BLD AUTO: 17.38 K/UL (ref 3.9–12.7)
WBC #/AREA URNS HPF: 0 /HPF (ref 0–5)

## 2019-08-02 PROCEDURE — 87502 INFLUENZA DNA AMP PROBE: CPT

## 2019-08-02 PROCEDURE — 82553 CREATINE MB FRACTION: CPT

## 2019-08-02 PROCEDURE — 93010 ELECTROCARDIOGRAM REPORT: CPT | Mod: ,,, | Performed by: INTERNAL MEDICINE

## 2019-08-02 PROCEDURE — 80053 COMPREHEN METABOLIC PANEL: CPT

## 2019-08-02 PROCEDURE — 36415 COLL VENOUS BLD VENIPUNCTURE: CPT

## 2019-08-02 PROCEDURE — 99285 EMERGENCY DEPT VISIT HI MDM: CPT

## 2019-08-02 PROCEDURE — 83880 ASSAY OF NATRIURETIC PEPTIDE: CPT

## 2019-08-02 PROCEDURE — 87040 BLOOD CULTURE FOR BACTERIA: CPT

## 2019-08-02 PROCEDURE — 93010 EKG 12-LEAD: ICD-10-PCS | Mod: ,,, | Performed by: INTERNAL MEDICINE

## 2019-08-02 PROCEDURE — 85025 COMPLETE CBC W/AUTO DIFF WBC: CPT

## 2019-08-02 PROCEDURE — 84484 ASSAY OF TROPONIN QUANT: CPT

## 2019-08-02 PROCEDURE — 81000 URINALYSIS NONAUTO W/SCOPE: CPT

## 2019-08-02 PROCEDURE — 93005 ELECTROCARDIOGRAM TRACING: CPT

## 2019-08-02 PROCEDURE — 82550 ASSAY OF CK (CPK): CPT

## 2019-08-02 PROCEDURE — 83605 ASSAY OF LACTIC ACID: CPT

## 2019-08-02 RX ORDER — LEVOFLOXACIN 500 MG/1
500 TABLET, FILM COATED ORAL DAILY
Qty: 7 TABLET | Refills: 0 | Status: SHIPPED | OUTPATIENT
Start: 2019-08-02 | End: 2019-08-09

## 2019-08-02 RX ORDER — CLINDAMYCIN HYDROCHLORIDE 300 MG/1
300 CAPSULE ORAL 4 TIMES DAILY
Qty: 28 CAPSULE | Refills: 0 | Status: SHIPPED | OUTPATIENT
Start: 2019-08-02 | End: 2019-08-09

## 2019-08-02 NOTE — CONSULTS
TRACEY consulted to assist with hospice set up. Family requesting [facility] services when the patient returns to [facility]. [facility] contacted [facility] to inform of referral. Referral paperwork faxed to [facility] and provided the agency with the patient's daughter's contact phone number. Sara Desouza will meet with the family later this evening at [facility].     Daya Galvan LMSW

## 2019-08-02 NOTE — ED TRIAGE NOTES
Pt presents via [facility] with C/O fever and left knee pain from the [facility]

## 2019-08-02 NOTE — ED NOTES
[facility] contacted to arrange transport for patient back to the [facility]. Cleared per Suyapa at the [facility]. Family at bedside updated on plan. Patient resting on stretcher. Continuous cardiac monitoring in place

## 2019-08-02 NOTE — ED NOTES
Call placed to Shanti at the [facility]. Advised that patient has been discharged. [facility] will admit at the [facility] at 1700 per Daya  @ [facility]. Shanti will arrange transport

## 2019-08-02 NOTE — ED NOTES
Care of patient transferred to [facility] for transport back to the [facility]. Report previously called to Shanti. Family at bedside. Vitals stable

## 2019-08-02 NOTE — ED PROVIDER NOTES
[facility]                                                 Chief Complaint  89 y.o. male with Fever (left knee pain)    History of Present Illness  Hernesto Sofia presents to the emergency room with altered mental status today  The patient has altered mental status, he was recently admitted to the nursing home  The patient was recently hospitalized previous to the NH with a urinary tract infection  Has had significant decline over last year due to his dementia issues per daughter  Patient has not done well at the nursing home, has become increasingly agitated  Patient also has had a low-grade fever, mild hypoxia this morning per the NH nurse  Patient presents agitated, confused, family states that he is in constant pain daily    The history is provided by the patient   device was not used during this ER visit  Past medical history: A. fib, hypertension, HLD, pacemaker  Past surgical history: Pacemaker placement and surgery  ALLERGIES: Penicillin    I have reviewed all of this patient's past medical, surgical, family, and social   histories as well as active allergies and medications documented in the  electronic medical record    Review of Systems and Physical Exam      Review of Systems  -- patient cannot give a reliable history or review of systems    Vital Signs  His tympanic temperature is 99.2 °F (37.3 °C).   His blood pressure is 110/65 and his pulse is 96.   His respiration is 23 and oxygen saturation is 100%.     Physical Exam  -- Nursing note and vitals reviewed  -- Constitutional: Appears well-developed and well-nourished  -- Head: Atraumatic. Normocephalic. No obvious abnormality  -- Eyes: Pupils are equal and reactive to light. Normal conjunctiva and lids  -- Cardiac: Normal rate, regular rhythm and normal heart sounds  -- Pulmonary: Normal respiratory effort, breath sounds clear to auscultation  -- Abdominal: Soft, no tenderness. Normal bowel sounds. Normal  liver edge  -- Musculoskeletal: Normal range of motion, no effusions. Joints stable   -- Neurological: No focal deficits. Showed good interaction with staff  -- Vascular: Posterior tibial, dorsalis pedis and radial pulses 2+ bilaterally      Emergency Room Course      Lab Results     K 4.4      CO2 22 (L)   BUN 21   CREATININE 0.7      ALKPHOS 106   AST 33   ALT 20   BILITOT 1.4 (H)   ALBUMIN 2.6 (L)   PROT 6.9   WBC 17.38 (H)   HGB 12.8 (L)   HCT 37.5 (L)            CPKMB 1.8   TROPONINI 0.014   INR 1.1    (H)   DDIMER 0.44   LACTATE 1.9   MG 1.8   TSH 3.306     Urinalysis  -- Urinalysis performed during this ER visit showed no signs of infection      EKG  -- atrial fibrillation with junctional ST depression    Radiology  -- Chest x-ray showed no infiltrate and showed no acute pathology     Additional Work up  -- Blood cultures have also been drawn, results are pending     MDM  Number of Diagnoses or Management Options  Aspiration into airway, initial encounter: new, needed workup  Fatigue: minor  Hospice care patient: minor     Amount and/or Complexity of Data Reviewed  Clinical lab tests: reviewed and ordered  Tests in the radiology section of CPT®: ordered and reviewed  Tests in the medicine section of CPT®: reviewed and ordered  Obtain history from someone other than the patient: yes    Risk of Complications, Morbidity, and/or Mortality  Presenting problems: high  Diagnostic procedures: high  Management options: high       ED Physician Management  -- Diagnosis management comments: 89 y.o. male with fever and altered mental status  -- patient has severe dementia with decreased mentation since entering the nursing home  -- patient has [place] [phone], started on a regular diet rather than pureed last week  -- patient has signs and symptoms suspicious for aspiration, daughter at bedside this a.m.  -- I had a long conversation with the daughter, this is his 3rd  ER visit at 30 days for issues  -- the daughter and family declined hospitalization, they want the patient comfortable ASAP  -- pt's wife (daughters Mother), was on hospice as well, family knows the process well  -- patient has significant pain and dementia, he is not doing well at the nursing home now  -- family would like to consider hospice case management contacted for hospice at the NH    Diagnosis  -- Hospice care patient.   -- Fatigue and Aspiration into airway    Disposition and Plan  -- Disposition: Hospice  -- Condition: stable  -- Follow-up: Patient to follow up with Hasmukh Pederson MD in 1-2 days.  -- I advised the patient that we have found no life threatening condition today  -- At this time, I believe the patient is clinically stable for discharge.   -- The patient acknowledges that close follow up with a MD is required   -- Patient agrees to comply with all instruction and direction given in the ER    This note is dictated on M*Modal word recognition program.  There are word recognition mistakes that are occasionally missed on review.             Cheo Lu MD  08/02/19 1780

## 2019-08-07 LAB — BACTERIA BLD CULT: NORMAL

## 2019-09-16 ENCOUNTER — EXTERNAL HOME HEALTH (OUTPATIENT)
Dept: HOME HEALTH SERVICES | Facility: HOSPITAL | Age: 84
End: 2019-09-16
Payer: MEDICARE

## 2021-08-25 NOTE — PLAN OF CARE
Problem: Adult Inpatient Plan of Care  Goal: Plan of Care Review  Outcome: Ongoing (interventions implemented as appropriate)  Patient with some complaints of HA. Some relief stated with PRN tylenol. Patient family worried about patient declining. Son at bedside staying the night. Chest xray showed left upper infiltrates. Patient made NPO and speech consulted. Little to no sleep throughout shift. IV Levaquin administered as ordered. Patient remaining afebrile VS stable. Alert to person only. Free from falls/injury. Plan of care reviewed and agreed upon with family.          Epidermal Sutures: 5-0 Fast Absorbing Gut

## 2021-10-25 NOTE — ED PROVIDER NOTES
[facility]                                                 Chief Complaint  89 y.o. male with Weakness (presyncope just PTA)    History of Present Illness  Hernesto Sofia presents to the emergency room with syncope issues today  Patient almost passed out at in the bathroom, he did not lose consciousness  Patient has had several syncopal episodes last couple months per his family  Pt also has severe dementia was recently started on a medication last week  Patient is alert and appropriate, no facial droop, normal motor exam in the ER  Patient denies any complaint at this time, he is not orthostatic on evaluation  No fever with good stable vital signs, patient states he is fine on ER interview    The history is provided by the patient   device was not used during this ER visit  Past medical history: A. fib, hypertension, HLD, pacemaker  Past surgical history: Pacemaker placement and surgery  ALLERGIES: Penicillin    I have reviewed all of this patient's past medical, surgical, family, and social   histories as well as active allergies and medications documented in the  electronic medical record    Review of Systems and Physical Exam      Review of Systems  -- Constitution - weakness and near-syncope at home, no LOC  -- Eyes - no tearing or redness, no visual disturbance  -- Ear, Nose - no tinnitus or earache, no nasal congestion or discharge  -- Mouth,Throat - no sore throat, no toothache, normal voice, normal swallowing  -- Respiratory - denies cough and congestion, no shortness of breath, no DONALDSON  -- Cardiovascular - denies chest pain, no palpitations, denies claudication  -- Gastrointestinal - denies abdominal pain, nausea, vomiting, or diarrhea  -- Genitourinary - no dysuria, no hematuria, no flank pain, no bladder pain  -- Musculoskeletal - denies back pain, negative for trauma or injury  -- Neurological - no headache, denies weakness or seizure; no LOC  -- Skin - denies  pallor, rash, or changes in skin. no hives or welts noted    Vital Signs  His oral temperature is 98.9 °F (37.2 °C).   His blood pressure is 168/109 and his pulse is 79.   His respiration is 20 and oxygen saturation is 97%.     Physical Exam  -- Nursing note and vitals reviewed  -- Constitutional: Appears well-developed and well-nourished  -- Head: Atraumatic. Normocephalic. No obvious abnormality  -- Eyes: Pupils are equal and reactive to light. Normal conjunctiva and lids  -- Cardiac: Normal rate, regular rhythm and normal heart sounds  -- Pulmonary: Normal respiratory effort, breath sounds clear to auscultation  -- Abdominal: Soft, no tenderness. Normal bowel sounds. Normal liver edge  -- Musculoskeletal: Normal range of motion, no effusions. Joints stable   -- Neurological: No focal deficits. Showed good interaction with staff  -- Skin: Warm and dry. No evidence of rash or cellulitis    Emergency Room Course      Lab Results     K 3.9      CO2 23   BUN 19   CREATININE 0.8      ALKPHOS 124   AST 20   ALT 17   BILITOT 1.3 (H)   ALBUMIN 3.7   PROT 7.3   WBC 11.16   HGB 13.6 (L)   HCT 41.5    (L)   CPK 57   CPK 57   CPKMB 1.4   TROPONINI 0.010   INR 1.1    (H)   DDIMER 0.44   MG 2.1   TSH 4.013 (H)     Urinalysis  -- Urinalysis performed during this ER visit showed no signs of infection      EKG   -- The EKG findings today were without concerning findings from baseline    Radiology  -- The CT of the head performed in the ER today was negative for acute pathology   -- Chest x-ray showed no infiltrate and showed no acute pathology     Medications Given  sodium chloride 0.9% bolus 500 mL (0 mLs Intravenous Stopped 6/16/19 [id number])     Diagnosis  -- Diagnoses of Postural dizziness with presyncope and Fatigue were pertinent to this visit.    Disposition and Plan  -- Disposition: home  -- Condition: stable  -- Follow-up: Patient to follow up with Hasmukh Pederson MD in 1-2 days.  -- I  advised the patient that we have found no life threatening condition today  -- At this time, I believe the patient is clinically stable for discharge.   -- The patient acknowledges that close follow up with a MD is required   -- Patient agrees to comply with all instruction and direction given in the ER    This note is dictated on M*Modal word recognition program.  There are word recognition mistakes that are occasionally missed on review.          Cheo Lu MD  06/16/19 9603     Initiate Treatment: Triamcinolone twice a day until clear then taper to maintain clearance Detail Level: Detailed